# Patient Record
Sex: FEMALE | Race: WHITE | HISPANIC OR LATINO | URBAN - METROPOLITAN AREA
[De-identification: names, ages, dates, MRNs, and addresses within clinical notes are randomized per-mention and may not be internally consistent; named-entity substitution may affect disease eponyms.]

---

## 2017-08-16 ENCOUNTER — OUTPATIENT (OUTPATIENT)
Dept: OUTPATIENT SERVICES | Facility: HOSPITAL | Age: 52
LOS: 1 days | End: 2017-08-16
Payer: COMMERCIAL

## 2017-08-16 ENCOUNTER — APPOINTMENT (OUTPATIENT)
Dept: ULTRASOUND IMAGING | Facility: CLINIC | Age: 52
End: 2017-08-16
Payer: COMMERCIAL

## 2017-08-16 ENCOUNTER — APPOINTMENT (OUTPATIENT)
Dept: MAMMOGRAPHY | Facility: CLINIC | Age: 52
End: 2017-08-16
Payer: COMMERCIAL

## 2017-08-16 DIAGNOSIS — Z00.8 ENCOUNTER FOR OTHER GENERAL EXAMINATION: ICD-10-CM

## 2017-08-16 PROCEDURE — G0279: CPT | Mod: 26

## 2017-08-16 PROCEDURE — 76641 ULTRASOUND BREAST COMPLETE: CPT

## 2017-08-16 PROCEDURE — 76641 ULTRASOUND BREAST COMPLETE: CPT | Mod: 26,50

## 2017-08-16 PROCEDURE — G0204: CPT | Mod: 26

## 2017-08-16 PROCEDURE — 77066 DX MAMMO INCL CAD BI: CPT

## 2017-08-16 PROCEDURE — G0279: CPT

## 2017-08-23 DIAGNOSIS — R92.1 MAMMOGRAPHIC CALCIFICATION FOUND ON DIAGNOSTIC IMAGING OF BREAST: ICD-10-CM

## 2017-08-23 DIAGNOSIS — N63 UNSPECIFIED LUMP IN BREAST: ICD-10-CM

## 2017-08-23 DIAGNOSIS — N60.01 SOLITARY CYST OF RIGHT BREAST: ICD-10-CM

## 2017-08-23 DIAGNOSIS — R92.2 INCONCLUSIVE MAMMOGRAM: ICD-10-CM

## 2018-10-08 ENCOUNTER — OUTPATIENT (OUTPATIENT)
Dept: OUTPATIENT SERVICES | Facility: HOSPITAL | Age: 53
LOS: 1 days | End: 2018-10-08
Payer: COMMERCIAL

## 2018-10-08 ENCOUNTER — APPOINTMENT (OUTPATIENT)
Dept: MAMMOGRAPHY | Facility: CLINIC | Age: 53
End: 2018-10-08
Payer: MEDICARE

## 2018-10-08 DIAGNOSIS — Z00.8 ENCOUNTER FOR OTHER GENERAL EXAMINATION: ICD-10-CM

## 2018-10-08 PROCEDURE — 77067 SCR MAMMO BI INCL CAD: CPT

## 2018-10-08 PROCEDURE — 77063 BREAST TOMOSYNTHESIS BI: CPT

## 2018-10-08 PROCEDURE — 77063 BREAST TOMOSYNTHESIS BI: CPT | Mod: 26

## 2018-10-08 PROCEDURE — 77067 SCR MAMMO BI INCL CAD: CPT | Mod: 26

## 2019-04-01 ENCOUNTER — APPOINTMENT (OUTPATIENT)
Age: 54
End: 2019-04-01
Payer: COMMERCIAL

## 2019-04-01 VITALS
DIASTOLIC BLOOD PRESSURE: 65 MMHG | SYSTOLIC BLOOD PRESSURE: 100 MMHG | BODY MASS INDEX: 29.12 KG/M2 | WEIGHT: 135 LBS | HEART RATE: 68 BPM | HEIGHT: 57 IN

## 2019-04-01 PROCEDURE — 92588 EVOKED AUDITORY TST COMPLETE: CPT

## 2019-04-01 PROCEDURE — 31231 NASAL ENDOSCOPY DX: CPT

## 2019-04-01 PROCEDURE — 92567 TYMPANOMETRY: CPT

## 2019-04-01 PROCEDURE — 92557 COMPREHENSIVE HEARING TEST: CPT

## 2019-04-01 PROCEDURE — 99204 OFFICE O/P NEW MOD 45 MIN: CPT | Mod: 25

## 2019-04-01 PROCEDURE — 92563 TONE DECAY HEARING TEST: CPT

## 2019-04-01 NOTE — HISTORY OF PRESENT ILLNESS
[de-identified] : co noise as 1 mo no uri\par neg hx re ears  no hx hearing loss\par co nasal congestion and dry mucous\par hx sinusitis occasional\par spring allergies

## 2019-04-01 NOTE — PROCEDURE
[FreeTextEntry6] : Sinus endoscope # 5\par Nasal septum exam shows septal deviation to the rt moderate\par The sinus endoscope was introduced into the right nares\par exam right middle meatus reveals no mucopus or inflammation\par moderate crusting noted bilaterally\par The scope was advanced and the sphenoethmoid region was inspected.\par The superior meatus and nasal vault are unremarkable.  The nasopharynx is unremarkable without inflammation or mass\par The sinus endoscope was introduced into the left nares\par exam left middle meatus reveals no mucopus or inflammation\par The scope was advanced and the sphenoethmoid region was inspected.\par The left superior meatus and nasal vault are unremarkable.\par

## 2019-04-01 NOTE — REVIEW OF SYSTEMS
[Sneezing] : sneezing [Seasonal Allergies] : seasonal allergies [Cough] : cough [Heartburn] : heartburn [Negative] : Heme/Lymph [Patient Intake Form Reviewed] : Patient intake form was reviewed [FreeTextEntry1] : headache  joint aches  rash watery eyes  muscle aches

## 2019-04-01 NOTE — ASSESSMENT
[FreeTextEntry1] : crusting nose\par saline irrigations bid\par levaquin 500 #10\par audio as sn loss 500 hz\par tinnitus due tolow freq sn loss\par pred 10 #40\par fu audio 2 weeks\par mri iac  if not better

## 2019-04-29 ENCOUNTER — APPOINTMENT (OUTPATIENT)
Dept: OTOLARYNGOLOGY | Facility: CLINIC | Age: 54
End: 2019-04-29
Payer: COMMERCIAL

## 2019-04-29 VITALS
WEIGHT: 138 LBS | BODY MASS INDEX: 29.77 KG/M2 | HEIGHT: 57 IN | SYSTOLIC BLOOD PRESSURE: 101 MMHG | HEART RATE: 83 BPM | DIASTOLIC BLOOD PRESSURE: 61 MMHG

## 2019-04-29 DIAGNOSIS — J34.2 DEVIATED NASAL SEPTUM: ICD-10-CM

## 2019-04-29 PROCEDURE — 99214 OFFICE O/P EST MOD 30 MIN: CPT | Mod: 25

## 2019-04-29 PROCEDURE — 92553 AUDIOMETRY AIR & BONE: CPT

## 2019-04-29 PROCEDURE — 31231 NASAL ENDOSCOPY DX: CPT

## 2019-04-29 PROCEDURE — 92567 TYMPANOMETRY: CPT

## 2019-04-29 NOTE — PHYSICAL EXAM
[Nasal Endoscopy Performed] : nasal endoscopy was performed, see procedure section for findings [Midline] : trachea located in midline position [Normal] : no rashes [de-identified] : see procedure

## 2019-04-29 NOTE — HISTORY OF PRESENT ILLNESS
[de-identified] : fu as low frequency hearing loss ?tinnitus now better\par no vertigo\par seasonal allergy\par now claritin and saline spray

## 2019-04-29 NOTE — PROCEDURE
[FreeTextEntry6] : Sinus endoscope # 4\par Nasal septum exam shows septal deviation to the rt at valve 3 plus\par The sinus endoscope was introduced into the right nares\par exam right middle meatus reveals no mucopus or inflammation\par The scope was advanced and the sphenoethmoid region was inspected.\par The superior meatus and nasal vault are unremarkable.  The nasopharynx is unremarkable without inflammation or mass\par The sinus endoscope was introduced into the left nares\par exam left middle meatus reveals  mucopus crusting l mm\par The scope was advanced and the sphenoethmoid region was inspected.\par The left superior meatus and nasal vault are unremarkable.\par

## 2019-04-29 NOTE — REASON FOR VISIT
[Subsequent Evaluation] : a subsequent evaluation for [Hearing Loss] : hearing loss [FreeTextEntry2] : hearing

## 2019-04-29 NOTE — REVIEW OF SYSTEMS
[Recurrent Ear Infections] : recurrent ear infections [Sinus Pain] : sinus pain [Sinus Pressure] : sinus pressure [Discolored Nasal Discharge] : discolored nasal discharge [Negative] : Heme/Lymph

## 2019-04-29 NOTE — ASSESSMENT
[FreeTextEntry1] : clinical signs sinusitis\par allergic rhinitis\par audio as sn loss 500 hz\par rec fu audio 3 mo\par trial azxelastine\par cefdinir 300 #20

## 2019-07-29 ENCOUNTER — APPOINTMENT (OUTPATIENT)
Dept: OTOLARYNGOLOGY | Facility: CLINIC | Age: 54
End: 2019-07-29

## 2019-08-28 ENCOUNTER — APPOINTMENT (OUTPATIENT)
Dept: OTOLARYNGOLOGY | Facility: CLINIC | Age: 54
End: 2019-08-28

## 2019-09-23 ENCOUNTER — APPOINTMENT (OUTPATIENT)
Dept: OTOLARYNGOLOGY | Facility: CLINIC | Age: 54
End: 2019-09-23
Payer: COMMERCIAL

## 2019-09-23 VITALS
HEIGHT: 57 IN | BODY MASS INDEX: 28.91 KG/M2 | SYSTOLIC BLOOD PRESSURE: 99 MMHG | HEART RATE: 65 BPM | WEIGHT: 134 LBS | DIASTOLIC BLOOD PRESSURE: 63 MMHG

## 2019-09-23 DIAGNOSIS — H90.3 SENSORINEURAL HEARING LOSS, BILATERAL: ICD-10-CM

## 2019-09-23 PROCEDURE — 31231 NASAL ENDOSCOPY DX: CPT

## 2019-09-23 PROCEDURE — 92557 COMPREHENSIVE HEARING TEST: CPT

## 2019-09-23 PROCEDURE — 99213 OFFICE O/P EST LOW 20 MIN: CPT | Mod: 25

## 2019-09-23 PROCEDURE — 92567 TYMPANOMETRY: CPT

## 2019-09-23 NOTE — PROCEDURE
[FreeTextEntry6] : Indication:  Unable to examine nasal passages and paranasal sinus drainage adequately with anterior rhinoscopy.\par Sinus endoscope # 2\par Nasal septum exam shows   mild  septal deviation\par The inferior nasal turbinates are moderate in size with normal mucosa.\par The sinus endoscope was introduced into the right nares.   \par exam right middle meatus reveals 2+ mucopus.  There is moderate inflammation present in the middle meatus and involving the middle turbinate.\par The scope was advanced and the sphenoethmoid region was inspected.\par The superior meatus and nasal vault are unremarkable.  The nasopharynx is unremarkable without inflammation or mass\par The sinus endoscope was introduced into the left nares.\par Examination of the left middle meatus reveals 2+ mucopus with moderate inflammation of the middle turbinate.\par The scope was advanced and the sphenoethmoid region was inspected.\par The left superior meatus and nasal vault are unremarkable.  \par \par

## 2019-09-23 NOTE — ASSESSMENT
[FreeTextEntry1] : clinical signs sinusitis\par cefdinir\par \par allergic rhinitis\par audio as sn loss now 30 db as\par rec mri iac\par pred 20 bid than taper

## 2019-09-23 NOTE — HISTORY OF PRESENT ILLNESS
[de-identified] : now w weather change co excessive mucous nose and pnd\par recent uri\par had good response to azelastine and cefdinir\par pos hx allergy eye itching and serial sneezing\par fu as w mild low frequency loss and intermittent as tinnitus

## 2019-11-14 ENCOUNTER — FORM ENCOUNTER (OUTPATIENT)
Age: 54
End: 2019-11-14

## 2019-11-15 ENCOUNTER — APPOINTMENT (OUTPATIENT)
Dept: MRI IMAGING | Facility: CLINIC | Age: 54
End: 2019-11-15
Payer: COMMERCIAL

## 2019-11-15 ENCOUNTER — OUTPATIENT (OUTPATIENT)
Dept: OUTPATIENT SERVICES | Facility: HOSPITAL | Age: 54
LOS: 1 days | End: 2019-11-15
Payer: COMMERCIAL

## 2019-11-15 DIAGNOSIS — Z00.8 ENCOUNTER FOR OTHER GENERAL EXAMINATION: ICD-10-CM

## 2019-11-15 PROCEDURE — 70553 MRI BRAIN STEM W/O & W/DYE: CPT

## 2019-11-15 PROCEDURE — A9585: CPT

## 2019-11-15 PROCEDURE — 70553 MRI BRAIN STEM W/O & W/DYE: CPT | Mod: 26

## 2019-11-21 ENCOUNTER — APPOINTMENT (OUTPATIENT)
Dept: MAMMOGRAPHY | Facility: CLINIC | Age: 54
End: 2019-11-21
Payer: COMMERCIAL

## 2019-11-21 ENCOUNTER — APPOINTMENT (OUTPATIENT)
Dept: ULTRASOUND IMAGING | Facility: CLINIC | Age: 54
End: 2019-11-21
Payer: COMMERCIAL

## 2019-11-21 ENCOUNTER — OUTPATIENT (OUTPATIENT)
Dept: OUTPATIENT SERVICES | Facility: HOSPITAL | Age: 54
LOS: 1 days | End: 2019-11-21
Payer: COMMERCIAL

## 2019-11-21 ENCOUNTER — APPOINTMENT (OUTPATIENT)
Dept: RADIOLOGY | Facility: CLINIC | Age: 54
End: 2019-11-21
Payer: COMMERCIAL

## 2019-11-21 DIAGNOSIS — Z00.8 ENCOUNTER FOR OTHER GENERAL EXAMINATION: ICD-10-CM

## 2019-11-21 PROCEDURE — 77080 DXA BONE DENSITY AXIAL: CPT

## 2019-11-21 PROCEDURE — 76641 ULTRASOUND BREAST COMPLETE: CPT | Mod: 26,50

## 2019-11-21 PROCEDURE — 76641 ULTRASOUND BREAST COMPLETE: CPT

## 2019-11-21 PROCEDURE — 77063 BREAST TOMOSYNTHESIS BI: CPT | Mod: 26

## 2019-11-21 PROCEDURE — 77067 SCR MAMMO BI INCL CAD: CPT | Mod: 26

## 2019-11-21 PROCEDURE — 77080 DXA BONE DENSITY AXIAL: CPT | Mod: 26

## 2019-11-21 PROCEDURE — 77067 SCR MAMMO BI INCL CAD: CPT

## 2019-11-21 PROCEDURE — 77063 BREAST TOMOSYNTHESIS BI: CPT

## 2020-05-26 ENCOUNTER — APPOINTMENT (OUTPATIENT)
Dept: OTOLARYNGOLOGY | Facility: CLINIC | Age: 55
End: 2020-05-26

## 2020-06-15 ENCOUNTER — APPOINTMENT (OUTPATIENT)
Dept: OTOLARYNGOLOGY | Facility: CLINIC | Age: 55
End: 2020-06-15
Payer: COMMERCIAL

## 2020-06-15 VITALS — TEMPERATURE: 97.4 F | BODY MASS INDEX: 28.91 KG/M2 | WEIGHT: 134 LBS | HEIGHT: 57 IN

## 2020-06-15 DIAGNOSIS — J30.9 ALLERGIC RHINITIS, UNSPECIFIED: ICD-10-CM

## 2020-06-15 DIAGNOSIS — J32.2 CHRONIC ETHMOIDAL SINUSITIS: ICD-10-CM

## 2020-06-15 DIAGNOSIS — H68.022 CHRONIC EUSTACHIAN SALPINGITIS, LEFT EAR: ICD-10-CM

## 2020-06-15 PROCEDURE — 99214 OFFICE O/P EST MOD 30 MIN: CPT | Mod: 25

## 2020-06-15 PROCEDURE — 92557 COMPREHENSIVE HEARING TEST: CPT

## 2020-06-15 PROCEDURE — 92567 TYMPANOMETRY: CPT

## 2020-06-15 PROCEDURE — 31231 NASAL ENDOSCOPY DX: CPT

## 2020-06-15 NOTE — ASSESSMENT
[FreeTextEntry1] : hx chronic sinusitis\par doxycycline 10 bid #28\par continue azelastine\par daily saline irrigations\par consider repeat ct sinuses\par audio as sn loss stable source of tinnitus\par eust tube dys add levocitirizine\par consider ct sinuses

## 2020-06-15 NOTE — PROCEDURE
[FreeTextEntry6] : Indication:  Unable to adequately examine nasal passages and sinus drainage with anterior rhinoscopy.\par Scope # 4\par Mild to moderateseptal deviation is present on direct visualization on either side.\par Both inferior nasal turbinates are moderate in size with normal  appearing mucosa. \par The sinus endoscope was introduced into the right nares\par exam right middle meatus reveals no mucopus, polyps or inflammation.  The middle turbinate is unremarkable.\par The scope was advanced and the sphenoethmoid region was inspected.\par The superior meatus and nasal vault are unremarkable.  The nasopharynx is unremarkable without inflammation or mass\par The sinus endoscope was introduced into the left nares\par exam of the left middle meatus reveals no mucopus, polyps or inflammation and the left middle turbinate is unremarkable.\par The scope was advanced and the sphenoethmoid region was inspected.\par The left superior meatus and nasal vault are unremarkable.\par

## 2020-06-15 NOTE — HISTORY OF PRESENT ILLNESS
[de-identified] : co as popping cosntant hiss as\par had mri in past\par co excessive mucous nose colored\par claritin q d\par mri brain for sn loss shows left maxillary inflammation

## 2020-06-15 NOTE — REASON FOR VISIT
[Subsequent Evaluation] : a subsequent evaluation for [Tinnitus] : tinnitus [FreeTextEntry2] : left ear

## 2020-09-04 ENCOUNTER — TRANSCRIPTION ENCOUNTER (OUTPATIENT)
Age: 55
End: 2020-09-04

## 2020-09-14 ENCOUNTER — TRANSCRIPTION ENCOUNTER (OUTPATIENT)
Age: 55
End: 2020-09-14

## 2020-11-10 ENCOUNTER — TRANSCRIPTION ENCOUNTER (OUTPATIENT)
Age: 55
End: 2020-11-10

## 2020-11-12 ENCOUNTER — TRANSCRIPTION ENCOUNTER (OUTPATIENT)
Age: 55
End: 2020-11-12

## 2020-12-14 ENCOUNTER — OUTPATIENT (OUTPATIENT)
Dept: OUTPATIENT SERVICES | Facility: HOSPITAL | Age: 55
LOS: 1 days | End: 2020-12-14

## 2020-12-14 DIAGNOSIS — Z00.8 ENCOUNTER FOR OTHER GENERAL EXAMINATION: ICD-10-CM

## 2020-12-21 ENCOUNTER — APPOINTMENT (OUTPATIENT)
Dept: MAMMOGRAPHY | Facility: CLINIC | Age: 55
End: 2020-12-21
Payer: MEDICAID

## 2020-12-21 ENCOUNTER — OUTPATIENT (OUTPATIENT)
Dept: OUTPATIENT SERVICES | Facility: HOSPITAL | Age: 55
LOS: 1 days | End: 2020-12-21
Payer: MEDICAID

## 2020-12-21 DIAGNOSIS — Z00.8 ENCOUNTER FOR OTHER GENERAL EXAMINATION: ICD-10-CM

## 2020-12-21 PROCEDURE — 77067 SCR MAMMO BI INCL CAD: CPT

## 2020-12-21 PROCEDURE — 77063 BREAST TOMOSYNTHESIS BI: CPT | Mod: 26

## 2020-12-21 PROCEDURE — 77067 SCR MAMMO BI INCL CAD: CPT | Mod: 26

## 2020-12-21 PROCEDURE — 77063 BREAST TOMOSYNTHESIS BI: CPT

## 2021-01-13 ENCOUNTER — TRANSCRIPTION ENCOUNTER (OUTPATIENT)
Age: 56
End: 2021-01-13

## 2021-01-18 ENCOUNTER — EMERGENCY (EMERGENCY)
Facility: HOSPITAL | Age: 56
LOS: 1 days | Discharge: HOME | End: 2021-01-18
Attending: EMERGENCY MEDICINE | Admitting: EMERGENCY MEDICINE
Payer: MEDICAID

## 2021-01-18 VITALS
HEIGHT: 57 IN | TEMPERATURE: 98 F | SYSTOLIC BLOOD PRESSURE: 111 MMHG | WEIGHT: 134.04 LBS | HEART RATE: 89 BPM | OXYGEN SATURATION: 96 % | RESPIRATION RATE: 16 BRPM | DIASTOLIC BLOOD PRESSURE: 78 MMHG

## 2021-01-18 VITALS
HEART RATE: 69 BPM | SYSTOLIC BLOOD PRESSURE: 111 MMHG | DIASTOLIC BLOOD PRESSURE: 73 MMHG | RESPIRATION RATE: 17 BRPM | TEMPERATURE: 98 F | OXYGEN SATURATION: 99 %

## 2021-01-18 LAB
ALBUMIN SERPL ELPH-MCNC: 3.5 G/DL — SIGNIFICANT CHANGE UP (ref 3.3–5)
ALP SERPL-CCNC: 87 U/L — SIGNIFICANT CHANGE UP (ref 30–120)
ALT FLD-CCNC: 26 U/L DA — SIGNIFICANT CHANGE UP (ref 10–60)
ANION GAP SERPL CALC-SCNC: 9 MMOL/L — SIGNIFICANT CHANGE UP (ref 5–17)
APPEARANCE UR: CLEAR — SIGNIFICANT CHANGE UP
AST SERPL-CCNC: 18 U/L — SIGNIFICANT CHANGE UP (ref 10–40)
BACTERIA # UR AUTO: ABNORMAL
BASOPHILS # BLD AUTO: 0.01 K/UL — SIGNIFICANT CHANGE UP (ref 0–0.2)
BASOPHILS NFR BLD AUTO: 0.1 % — SIGNIFICANT CHANGE UP (ref 0–2)
BILIRUB SERPL-MCNC: 0.5 MG/DL — SIGNIFICANT CHANGE UP (ref 0.2–1.2)
BILIRUB UR-MCNC: NEGATIVE — SIGNIFICANT CHANGE UP
BUN SERPL-MCNC: 17 MG/DL — SIGNIFICANT CHANGE UP (ref 7–23)
CALCIUM SERPL-MCNC: 9.1 MG/DL — SIGNIFICANT CHANGE UP (ref 8.4–10.5)
CHLORIDE SERPL-SCNC: 106 MMOL/L — SIGNIFICANT CHANGE UP (ref 96–108)
CO2 SERPL-SCNC: 26 MMOL/L — SIGNIFICANT CHANGE UP (ref 22–31)
COLOR SPEC: YELLOW — SIGNIFICANT CHANGE UP
CREAT SERPL-MCNC: 0.74 MG/DL — SIGNIFICANT CHANGE UP (ref 0.5–1.3)
DIFF PNL FLD: ABNORMAL
EOSINOPHIL # BLD AUTO: 0.13 K/UL — SIGNIFICANT CHANGE UP (ref 0–0.5)
EOSINOPHIL NFR BLD AUTO: 1.6 % — SIGNIFICANT CHANGE UP (ref 0–6)
EPI CELLS # UR: SIGNIFICANT CHANGE UP
GLUCOSE SERPL-MCNC: 106 MG/DL — HIGH (ref 70–99)
GLUCOSE UR QL: NEGATIVE MG/DL — SIGNIFICANT CHANGE UP
HCT VFR BLD CALC: 36.1 % — SIGNIFICANT CHANGE UP (ref 34.5–45)
HGB BLD-MCNC: 11.6 G/DL — SIGNIFICANT CHANGE UP (ref 11.5–15.5)
IMM GRANULOCYTES NFR BLD AUTO: 0.3 % — SIGNIFICANT CHANGE UP (ref 0–1.5)
KETONES UR-MCNC: NEGATIVE — SIGNIFICANT CHANGE UP
LEUKOCYTE ESTERASE UR-ACNC: ABNORMAL
LYMPHOCYTES # BLD AUTO: 1.84 K/UL — SIGNIFICANT CHANGE UP (ref 1–3.3)
LYMPHOCYTES # BLD AUTO: 23.3 % — SIGNIFICANT CHANGE UP (ref 13–44)
MCHC RBC-ENTMCNC: 27 PG — SIGNIFICANT CHANGE UP (ref 27–34)
MCHC RBC-ENTMCNC: 32.1 GM/DL — SIGNIFICANT CHANGE UP (ref 32–36)
MCV RBC AUTO: 84.1 FL — SIGNIFICANT CHANGE UP (ref 80–100)
MONOCYTES # BLD AUTO: 0.55 K/UL — SIGNIFICANT CHANGE UP (ref 0–0.9)
MONOCYTES NFR BLD AUTO: 7 % — SIGNIFICANT CHANGE UP (ref 2–14)
NEUTROPHILS # BLD AUTO: 5.35 K/UL — SIGNIFICANT CHANGE UP (ref 1.8–7.4)
NEUTROPHILS NFR BLD AUTO: 67.7 % — SIGNIFICANT CHANGE UP (ref 43–77)
NITRITE UR-MCNC: NEGATIVE — SIGNIFICANT CHANGE UP
NRBC # BLD: 0 /100 WBCS — SIGNIFICANT CHANGE UP (ref 0–0)
PH UR: 7 — SIGNIFICANT CHANGE UP (ref 5–8)
PLATELET # BLD AUTO: 246 K/UL — SIGNIFICANT CHANGE UP (ref 150–400)
POTASSIUM SERPL-MCNC: 3.6 MMOL/L — SIGNIFICANT CHANGE UP (ref 3.5–5.3)
POTASSIUM SERPL-SCNC: 3.6 MMOL/L — SIGNIFICANT CHANGE UP (ref 3.5–5.3)
PROT SERPL-MCNC: 7.3 G/DL — SIGNIFICANT CHANGE UP (ref 6–8.3)
PROT UR-MCNC: 100 MG/DL
RBC # BLD: 4.29 M/UL — SIGNIFICANT CHANGE UP (ref 3.8–5.2)
RBC # FLD: 12.6 % — SIGNIFICANT CHANGE UP (ref 10.3–14.5)
RBC CASTS # UR COMP ASSIST: ABNORMAL /HPF (ref 0–4)
SODIUM SERPL-SCNC: 141 MMOL/L — SIGNIFICANT CHANGE UP (ref 135–145)
SP GR SPEC: 1.01 — SIGNIFICANT CHANGE UP (ref 1.01–1.02)
UROBILINOGEN FLD QL: NEGATIVE MG/DL — SIGNIFICANT CHANGE UP
WBC # BLD: 7.9 K/UL — SIGNIFICANT CHANGE UP (ref 3.8–10.5)
WBC # FLD AUTO: 7.9 K/UL — SIGNIFICANT CHANGE UP (ref 3.8–10.5)
WBC UR QL: >50

## 2021-01-18 PROCEDURE — 36415 COLL VENOUS BLD VENIPUNCTURE: CPT

## 2021-01-18 PROCEDURE — 96360 HYDRATION IV INFUSION INIT: CPT

## 2021-01-18 PROCEDURE — 99284 EMERGENCY DEPT VISIT MOD MDM: CPT

## 2021-01-18 PROCEDURE — 85025 COMPLETE CBC W/AUTO DIFF WBC: CPT

## 2021-01-18 PROCEDURE — 80053 COMPREHEN METABOLIC PANEL: CPT

## 2021-01-18 PROCEDURE — 99283 EMERGENCY DEPT VISIT LOW MDM: CPT | Mod: 25

## 2021-01-18 PROCEDURE — 87086 URINE CULTURE/COLONY COUNT: CPT

## 2021-01-18 PROCEDURE — 81001 URINALYSIS AUTO W/SCOPE: CPT

## 2021-01-18 RX ORDER — SODIUM CHLORIDE 9 MG/ML
1000 INJECTION INTRAMUSCULAR; INTRAVENOUS; SUBCUTANEOUS ONCE
Refills: 0 | Status: COMPLETED | OUTPATIENT
Start: 2021-01-18 | End: 2021-01-18

## 2021-01-18 RX ORDER — PHENAZOPYRIDINE HCL 100 MG
1 TABLET ORAL
Qty: 6 | Refills: 0
Start: 2021-01-18 | End: 2021-01-19

## 2021-01-18 RX ORDER — PHENAZOPYRIDINE HCL 100 MG
200 TABLET ORAL ONCE
Refills: 0 | Status: COMPLETED | OUTPATIENT
Start: 2021-01-18 | End: 2021-01-18

## 2021-01-18 RX ORDER — AZTREONAM 2 G
1 VIAL (EA) INJECTION
Qty: 14 | Refills: 0
Start: 2021-01-18 | End: 2021-01-24

## 2021-01-18 RX ADMIN — Medication 1 TABLET(S): at 01:32

## 2021-01-18 RX ADMIN — SODIUM CHLORIDE 1000 MILLILITER(S): 9 INJECTION INTRAMUSCULAR; INTRAVENOUS; SUBCUTANEOUS at 02:00

## 2021-01-18 RX ADMIN — Medication 200 MILLIGRAM(S): at 01:20

## 2021-01-18 RX ADMIN — SODIUM CHLORIDE 1000 MILLILITER(S): 9 INJECTION INTRAMUSCULAR; INTRAVENOUS; SUBCUTANEOUS at 01:22

## 2021-01-18 NOTE — ED PROVIDER NOTE - OBJECTIVE STATEMENT
Patient developed pain on urination, and blood in urine a few days ago. Went to Urgent Care and was put on doxycycline and nitrofurantoin for a UTI. Seemed a little better at first, but tonight, the pain/burning on urination is severe again, and the blood in the urine is back. No fever. No n/v/d/c. Mild lower back pain. No flank pain. Some suprapubic pain.

## 2021-01-18 NOTE — ED PROVIDER NOTE - PATIENT PORTAL LINK FT
You can access the FollowMyHealth Patient Portal offered by [facility] by registering at the following website: [email or web address]. By joining [facility]’s FollowMyHealth portal, you will also be able to view your health information using other applications (apps) compatible with our system.

## 2021-01-18 NOTE — ED ADULT NURSE NOTE - NSIMPLEMENTINTERV_GEN_ALL_ED
Implemented All Universal Safety Interventions:  [place] to call system. Call bell, personal items and telephone within reach. Instruct patient to call for assistance. Room bathroom lighting operational. Non-slip footwear when patient is off stretcher. Physically safe environment: no spills, clutter or unnecessary equipment. Stretcher in lowest position, wheels locked, appropriate side rails in place.

## 2021-01-18 NOTE — ED ADULT NURSE NOTE - OBJECTIVE STATEMENT
pt presented to ED with c/o burning urination and left lower back pain, diagnosed with UTI 3 days ago is on antibiotics, denies fever/chills, denies N/V/D.

## 2021-01-18 NOTE — ED PROVIDER NOTE - NSFOLLOWUPINSTRUCTIONS_ED_ALL_ED_FT
Urinary Tract Infection in Women    WHAT YOU NEED TO KNOW:    What is a urinary tract infection (UTI)? A UTI is caused by bacteria that get inside your urinary tract. Your urinary tract includes your kidneys, ureters, bladder, and urethra. Urine is made in your kidneys, and it flows from the ureters to the bladder. Urine leaves the bladder through the urethra. A UTI is more common in your lower urinary tract, which includes your bladder and urethra.     Female Urinary System         What increases my risk for a UTI?   •A urinary catheter or self-catheterization      •Pregnancy      •Urinary tract problems, such as a narrowing, kidney stones, or inability to empty your bladder completely      •History of a UTI      •Sexual intercourse      •Menopause      •Diabetes or obesity      What are the signs and symptoms of a UTI?   •Urinating more often than usual, leaking urine, or waking from sleep to urinate      •Pain or burning when you urinate      •Pain or pressure in your lower abdomen       •Urine that smells bad      •Blood in your urine      How is a UTI diagnosed? Your healthcare provider will ask about your signs and symptoms. Your provider may press on your abdomen, sides, and back to check if you feel pain. Your urine will be tested for bacteria that may be causing your infection. If you have UTIs often, you may need more tests to find the cause.    How is a UTI treated?   •Antibiotics help fight a bacterial infection. If you have UTIs often (called recurrent UTIs), you may be given antibiotics to take regularly. You will be given directions for when and how to use antibiotics. The goal is to prevent UTIs but not cause antibiotic resistance by using antibiotics too often.      •Medicines may be given to decrease pain and burning when you urinate. They will also help decrease the feeling that you need to urinate often. These medicines will make your urine orange or red.      What can I do to prevent a UTI?   •Talk to your healthcare provider about your birth control method. You may need to change your method if it is increasing your risk for UTIs.      •Empty your bladder often. Urinate and empty your bladder as soon as you feel the need. Do not hold your urine for long periods of time.      •Wipe from front to back after you urinate or have a bowel movement. This will help prevent germs from getting into your urinary tract through your urethra.      •Drink liquids as directed. Ask how much liquid to drink each day and which liquids are best for you. You may need to drink more liquids than usual to help flush out the bacteria. Do not drink alcohol, caffeine, or citrus juices. These can irritate your bladder and increase your symptoms. Your healthcare provider may recommend cranberry juice to help prevent a UTI.      •Urinate after you have sex. This can help flush out bacteria passed during sex.      •Do not douche or use feminine deodorants. These can change the chemical balance in your vagina.      •Change sanitary pads or tampons often. This will help prevent germs from getting into your urinary tract.       •Talk to your healthcare provider about your birth control method. You may need to change your method if it is increasing your risk for UTIs.      •Wear cotton underwear and clothes that are loose. Tight pants and nylon underwear can trap moisture and cause bacteria to grow.      •Vaginal estrogen may be recommended. This medicine helps prevent UTIs in women who have gone through menopause or are in meet-menopause.      •Do pelvic muscle exercises often. Pelvic muscle exercises may help you start and stop urinating. Strong pelvic muscles may help you empty your bladder easier. Squeeze these muscles tightly for 5 seconds like you are trying to hold back urine. Then relax for 5 seconds. Gradually work up to squeezing for 10 seconds. Do 3 sets of 15 repetitions a day, or as directed.      When should I seek immediate care?   •You are urinating very little or not at all.      •You have a high fever with shaking chills.       •You have side or back pain that gets worse.      When should I call my doctor?   •You have a mild fever.      •You do not feel better after 2 days of taking antibiotics.      •You have new symptoms, such as blood or pus in your urine.       •You are vomiting.       •You have questions or concerns about your condition or care.      CARE AGREEMENT:    You have the right to help plan your care. Learn about your health condition and how it may be treated. Discuss treatment options with your healthcare providers to decide what care you want to receive. You always have the right to refuse treatment.

## 2021-01-18 NOTE — ED ADULT NURSE REASSESSMENT NOTE - NS ED NURSE REASSESS COMMENT FT1
pt seen and examined by dr barrios. md aware of all results. pt d/c to self. verbalized understanding of d/c instructions. left unit in NAD. ambulated unassisted

## 2021-01-18 NOTE — ED PROVIDER NOTE - CLINICAL SUMMARY MEDICAL DECISION MAKING FREE TEXT BOX
Patient with symptoms of cystitis, not responding to prescribed antibiotics. Will check labs, urine, and switch antibiotics. Will start phenazopyridine for symptoms

## 2021-01-19 LAB
CULTURE RESULTS: SIGNIFICANT CHANGE UP
SPECIMEN SOURCE: SIGNIFICANT CHANGE UP

## 2021-02-18 ENCOUNTER — TRANSCRIPTION ENCOUNTER (OUTPATIENT)
Age: 56
End: 2021-02-18

## 2021-02-23 ENCOUNTER — TRANSCRIPTION ENCOUNTER (OUTPATIENT)
Age: 56
End: 2021-02-23

## 2021-03-13 ENCOUNTER — TRANSCRIPTION ENCOUNTER (OUTPATIENT)
Age: 56
End: 2021-03-13

## 2021-05-07 ENCOUNTER — TRANSCRIPTION ENCOUNTER (OUTPATIENT)
Age: 56
End: 2021-05-07

## 2021-06-12 ENCOUNTER — EMERGENCY (EMERGENCY)
Facility: HOSPITAL | Age: 56
LOS: 1 days | Discharge: HOME | End: 2021-06-12
Attending: EMERGENCY MEDICINE | Admitting: EMERGENCY MEDICINE
Payer: MEDICAID

## 2021-06-12 VITALS
WEIGHT: 130.07 LBS | DIASTOLIC BLOOD PRESSURE: 58 MMHG | OXYGEN SATURATION: 100 % | HEIGHT: 57 IN | HEART RATE: 62 BPM | SYSTOLIC BLOOD PRESSURE: 108 MMHG | RESPIRATION RATE: 20 BRPM | TEMPERATURE: 98 F

## 2021-06-12 LAB
ALBUMIN SERPL ELPH-MCNC: 3.4 G/DL — SIGNIFICANT CHANGE UP (ref 3.3–5)
ALP SERPL-CCNC: 82 U/L — SIGNIFICANT CHANGE UP (ref 30–120)
ALT FLD-CCNC: 32 U/L DA — SIGNIFICANT CHANGE UP (ref 10–60)
ANION GAP SERPL CALC-SCNC: 5 MMOL/L — SIGNIFICANT CHANGE UP (ref 5–17)
AST SERPL-CCNC: 23 U/L — SIGNIFICANT CHANGE UP (ref 10–40)
BASOPHILS # BLD AUTO: 0.01 K/UL — SIGNIFICANT CHANGE UP (ref 0–0.2)
BASOPHILS NFR BLD AUTO: 0.2 % — SIGNIFICANT CHANGE UP (ref 0–2)
BILIRUB SERPL-MCNC: 0.5 MG/DL — SIGNIFICANT CHANGE UP (ref 0.2–1.2)
BUN SERPL-MCNC: 17 MG/DL — SIGNIFICANT CHANGE UP (ref 7–23)
CALCIUM SERPL-MCNC: 9 MG/DL — SIGNIFICANT CHANGE UP (ref 8.4–10.5)
CHLORIDE SERPL-SCNC: 106 MMOL/L — SIGNIFICANT CHANGE UP (ref 96–108)
CO2 SERPL-SCNC: 27 MMOL/L — SIGNIFICANT CHANGE UP (ref 22–31)
CREAT SERPL-MCNC: 0.87 MG/DL — SIGNIFICANT CHANGE UP (ref 0.5–1.3)
D DIMER BLD IA.RAPID-MCNC: <150 NG/ML DDU — SIGNIFICANT CHANGE UP
EOSINOPHIL # BLD AUTO: 0.24 K/UL — SIGNIFICANT CHANGE UP (ref 0–0.5)
EOSINOPHIL NFR BLD AUTO: 4.2 % — SIGNIFICANT CHANGE UP (ref 0–6)
GLUCOSE SERPL-MCNC: 99 MG/DL — SIGNIFICANT CHANGE UP (ref 70–99)
HCT VFR BLD CALC: 35.1 % — SIGNIFICANT CHANGE UP (ref 34.5–45)
HGB BLD-MCNC: 11.4 G/DL — LOW (ref 11.5–15.5)
IMM GRANULOCYTES NFR BLD AUTO: 0.2 % — SIGNIFICANT CHANGE UP (ref 0–1.5)
LYMPHOCYTES # BLD AUTO: 1.86 K/UL — SIGNIFICANT CHANGE UP (ref 1–3.3)
LYMPHOCYTES # BLD AUTO: 32.7 % — SIGNIFICANT CHANGE UP (ref 13–44)
MCHC RBC-ENTMCNC: 27.5 PG — SIGNIFICANT CHANGE UP (ref 27–34)
MCHC RBC-ENTMCNC: 32.5 GM/DL — SIGNIFICANT CHANGE UP (ref 32–36)
MCV RBC AUTO: 84.8 FL — SIGNIFICANT CHANGE UP (ref 80–100)
MONOCYTES # BLD AUTO: 0.45 K/UL — SIGNIFICANT CHANGE UP (ref 0–0.9)
MONOCYTES NFR BLD AUTO: 7.9 % — SIGNIFICANT CHANGE UP (ref 2–14)
NEUTROPHILS # BLD AUTO: 3.12 K/UL — SIGNIFICANT CHANGE UP (ref 1.8–7.4)
NEUTROPHILS NFR BLD AUTO: 54.8 % — SIGNIFICANT CHANGE UP (ref 43–77)
NRBC # BLD: 0 /100 WBCS — SIGNIFICANT CHANGE UP (ref 0–0)
PLATELET # BLD AUTO: 227 K/UL — SIGNIFICANT CHANGE UP (ref 150–400)
POTASSIUM SERPL-MCNC: 3.7 MMOL/L — SIGNIFICANT CHANGE UP (ref 3.5–5.3)
POTASSIUM SERPL-SCNC: 3.7 MMOL/L — SIGNIFICANT CHANGE UP (ref 3.5–5.3)
PROT SERPL-MCNC: 6.7 G/DL — SIGNIFICANT CHANGE UP (ref 6–8.3)
RBC # BLD: 4.14 M/UL — SIGNIFICANT CHANGE UP (ref 3.8–5.2)
RBC # FLD: 13.2 % — SIGNIFICANT CHANGE UP (ref 10.3–14.5)
SODIUM SERPL-SCNC: 138 MMOL/L — SIGNIFICANT CHANGE UP (ref 135–145)
TROPONIN I SERPL-MCNC: 0 NG/ML — LOW (ref 0.02–0.06)
WBC # BLD: 5.69 K/UL — SIGNIFICANT CHANGE UP (ref 3.8–10.5)
WBC # FLD AUTO: 5.69 K/UL — SIGNIFICANT CHANGE UP (ref 3.8–10.5)

## 2021-06-12 PROCEDURE — 99285 EMERGENCY DEPT VISIT HI MDM: CPT

## 2021-06-12 PROCEDURE — 71046 X-RAY EXAM CHEST 2 VIEWS: CPT | Mod: 26

## 2021-06-12 PROCEDURE — 93010 ELECTROCARDIOGRAM REPORT: CPT

## 2021-06-12 NOTE — ED ADULT NURSE NOTE - OBJECTIVE STATEMENT
57yo female walked into ED, pt c/o left sided chest pain X2 days prior to ED arrival. pt indicates symptoms feel like "heart burn". pt indicates symptoms also worsen upon palpitation. pt denies dizziness, SOB

## 2021-06-12 NOTE — ED PROVIDER NOTE - CLINICAL SUMMARY MEDICAL DECISION MAKING FREE TEXT BOX
left sided cp - r/o acs vs PE, possibly gastritis/reflux/ulcer related vs costochondritis- labs and meds, if all negative to f/u cards/GI

## 2021-06-12 NOTE — ED PROVIDER NOTE - OBJECTIVE STATEMENT
56 y.o. F c/o left lower cp since yesterday, got worse after eating potato tonight and had to cough, felt the pain was worse, tried a pill for acid, helped a little, never had pain like this before, no sob, no n/v, was able to eat after potato without incident

## 2021-06-12 NOTE — ED PROVIDER NOTE - NSFOLLOWUPINSTRUCTIONS_ED_ALL_ED_FT
Costochondritis    WHAT YOU NEED TO KNOW:    Costochondritis is a condition that causes pain in the cartilage that connect your ribs to your sternum (breastbone). Cartilage is the tough, bendable tissue that protects your bones.     DISCHARGE INSTRUCTIONS:    Medicines:   •Acetaminophen: This medicine decreases pain. Acetaminophen is available without a doctor's order. Ask how much to take and how often to take it. Follow directions. Acetaminophen can cause liver damage if not taken correctly.      •NSAIDs, such as ibuprofen, help decrease swelling, pain, and fever. This medicine is available with or without a doctor's order. NSAIDs can cause stomach bleeding or kidney problems in certain people. If you take blood thinner medicine, always ask if NSAIDs are safe for you. Always read the medicine label and follow directions. Do not give these medicines to children under 6 months of age without direction from your child's healthcare provider.      •Take your medicine as directed. Contact your healthcare provider if you think your medicine is not helping or if you have side effects. Tell him of her if you are allergic to any medicine. Keep a list of the medicines, vitamins, and herbs you take. Include the amounts, and when and why you take them. Bring the list or the pill bottles to follow-up visits. Carry your medicine list with you in case of an emergency.      Follow up with your healthcare provider as directed: Write down your questions so you remember to ask them during your visits.     Rest: You may need to get more rest. Learn which movements and activities cause pain, and avoid doing them. Do not carry objects, such as a purse or backpack, if this is painful. Avoid activities such as rowing and weightlifting until your pain decreases or goes away. Ask which activities are best for you to do while you recover.    Heat: Heat helps decrease pain in some patients. Apply heat on the area for 20 to 30 minutes every 2 hours for as many days as directed.     Ice: Ice helps decrease swelling and pain. Ice may also help prevent tissue damage. Use an ice pack, or put crushed ice in a plastic bag. Cover it with a towel and place it on the painful area for 15 to 20 minutes every hour or as directed.    Stretching exercises: Gentle stretching may help your symptoms.  a doorway and put your hands on the door frame at the level of your ears or shoulders. Take 1 step forward and gently stretch your chest. Try this with your hands higher up on the doorway.     Contact your healthcare provider if:   •You have a fever.      •The painful areas of your chest look swollen, red, and feel warm to the touch.       •You cannot sleep because of the pain.      •You have questions or concerns about your condition or care.        Chest Pain    WHAT YOU NEED TO KNOW:    Chest pain can be caused by a range of conditions, from not serious to life-threatening. Chest pain can be a symptom of a digestive problem, such as acid reflux or a stomach ulcer. An anxiety attack or a strong emotion, such as anger, can also cause chest pain. Infection, inflammation, or a fracture in the bones or cartilage in your chest can cause pain or discomfort. Sometimes chest pain or pressure is caused by poor blood flow to your heart (angina). Chest pain may also be caused by life-threatening conditions such as a heart attack or blood clot in your lungs.    DISCHARGE INSTRUCTIONS:    Call your local emergency number (911 in the ) or have someone call if:   •You have any of the following signs of a heart attack: ?Squeezing, pressure, or pain in your chest      ?You may also have any of the following: ?Discomfort or pain in your back, neck, jaw, stomach, or arm      ?Shortness of breath      ?Nausea or vomiting      ?Lightheadedness or a sudden cold sweat            Return to the emergency department if:   •You have chest discomfort that gets worse, even with medicine.      •You cough or vomit blood.      •Your bowel movements are black or bloody.      •You cannot stop vomiting, or it hurts to swallow.      Call your doctor if:   •You have questions or concerns about your condition or care.          Medicines:   •Medicines may be given to treat the cause of your chest pain. Examples include pain medicine, anxiety medicine, or medicines to increase blood flow to your heart.      •Do not take certain medicines without asking your healthcare provider first. These include NSAIDs, herbal or vitamin supplements, or hormones (estrogen or progestin).      •Take your medicine as directed. Contact your healthcare provider if you think your medicine is not helping or if you have side effects. Tell him or her if you are allergic to any medicine. Keep a list of the medicines, vitamins, and herbs you take. Include the amounts, and when and why you take them. Bring the list or the pill bottles to follow-up visits. Carry your medicine list with you in case of an emergency.      Healthy living tips: The following are general healthy guidelines. If the cause of your chest pain is known, your healthcare provider will give you specific guidelines to follow.  •Do not smoke. Nicotine and other chemicals in cigarettes and cigars can cause lung and heart damage. Ask your healthcare provider for information if you currently smoke and need help to quit. E-cigarettes or smokeless tobacco still contain nicotine. Talk to your healthcare provider before you use these products.      •Choose a variety of healthy foods as often as possible. Include fresh, frozen, or canned fruits and vegetables. Also include low-fat dairy products, fish, chicken (without skin), and lean meats. Your healthcare provider or a dietitian can help you create meal plans. You may need to avoid certain foods or drinks if your pain is caused by a digestion problem.  Healthy Foods           •Lower your sodium (salt) intake. Limit foods that are high in sodium, such as canned foods, salty snacks, and cold cuts. If you add salt when you cook food, do not add more at the table. Choose low-sodium canned foods as much as possible.             •Drink plenty of water every day. Water helps your body to control your temperature and blood pressure. Ask your healthcare provider how much water you should drink every day.      •Ask about activity. Your healthcare provider will tell you which activities to limit or avoid. Ask when you can drive, return to work, and have sex. Ask about the best exercise plan for you.      •Maintain a healthy weight. Ask your healthcare provider what a healthy weight is for you. Ask him or her to help you create a safe weight loss plan if you are overweight.      •Ask about vaccines you may need. Get the influenza (flu) vaccine every year as soon as recommended, usually in September or October. You may also need a pneumococcal vaccine to prevent pneumonia. The vaccine is usually given every 5 years, starting at age 65. Your healthcare provider can tell you if should get other vaccines, and when to get them.      Follow up with your healthcare provider within 72 hours, or as directed: You may need to return for more tests to find the cause of your chest pain. You may be referred to a specialist, such as a cardiologist or gastroenterologist. Write down your questions so you remember to ask them during your visits.

## 2021-06-12 NOTE — ED PROVIDER NOTE - RESPIRATORY, MLM
Breath sounds clear and equal bilaterally. mild ttp left lower anterior chest wall, just lateral to sternum

## 2021-06-12 NOTE — ED PROVIDER NOTE - PROGRESS NOTE DETAILS
pt asking for tylenol for HA, feels better than on arrival, discussed results, will provide copy, ready for dc

## 2021-06-12 NOTE — ED PROVIDER NOTE - PATIENT PORTAL LINK FT
You can access the FollowMyHealth Patient Portal offered by [facility] by registering at the following website: [email or web address]. By joining [employer]’s FollowMyHealth portal, you will also be able to view your health information using other applications (apps) compatible with our system.

## 2021-06-12 NOTE — ED PROVIDER NOTE - CARE PROVIDER_API CALL
Rolando Guadalupe (MD)  Cardiovascular Disease; Internal Medicine  [address], [address]  [place] [address]  Phone: [phone]  Fax: [phone]  Follow Up Time: 1-3 Days    Elliott Anthony ()  Internal Medicine  [address] [place] [address]  Phone: [phone]  Fax: [phone]  Follow Up Time: 1-3 Days

## 2021-06-12 NOTE — ED PROVIDER NOTE - PROVIDER TOKENS
PROVIDER:[TOKEN:[[id number]],FOLLOWUP:[1-3 Days]],PROVIDER:[TOKEN:[[id number]],FOLLOWUP:[1-3 Days]]

## 2021-06-13 VITALS
DIASTOLIC BLOOD PRESSURE: 53 MMHG | SYSTOLIC BLOOD PRESSURE: 91 MMHG | RESPIRATION RATE: 18 BRPM | TEMPERATURE: 98 F | HEART RATE: 66 BPM | OXYGEN SATURATION: 98 %

## 2021-06-13 PROCEDURE — 99284 EMERGENCY DEPT VISIT MOD MDM: CPT | Mod: 25

## 2021-06-13 PROCEDURE — 36415 COLL VENOUS BLD VENIPUNCTURE: CPT

## 2021-06-13 PROCEDURE — 85379 FIBRIN DEGRADATION QUANT: CPT

## 2021-06-13 PROCEDURE — 85025 COMPLETE CBC W/AUTO DIFF WBC: CPT

## 2021-06-13 PROCEDURE — 84484 ASSAY OF TROPONIN QUANT: CPT

## 2021-06-13 PROCEDURE — 71046 X-RAY EXAM CHEST 2 VIEWS: CPT

## 2021-06-13 PROCEDURE — 80053 COMPREHEN METABOLIC PANEL: CPT

## 2021-06-13 PROCEDURE — 93005 ELECTROCARDIOGRAM TRACING: CPT

## 2021-06-13 RX ORDER — ACETAMINOPHEN 500 MG
650 TABLET ORAL ONCE
Refills: 0 | Status: COMPLETED | OUTPATIENT
Start: 2021-06-13 | End: 2021-06-13

## 2021-06-13 RX ADMIN — Medication 30 MILLILITER(S): at 00:20

## 2021-06-13 RX ADMIN — Medication 650 MILLIGRAM(S): at 00:41

## 2021-08-20 ENCOUNTER — EMERGENCY (EMERGENCY)
Facility: HOSPITAL | Age: 56
LOS: 1 days | Discharge: HOME | End: 2021-08-20
Attending: INTERNAL MEDICINE | Admitting: INTERNAL MEDICINE
Payer: MEDICAID

## 2021-08-20 VITALS
HEART RATE: 74 BPM | OXYGEN SATURATION: 98 % | DIASTOLIC BLOOD PRESSURE: 70 MMHG | RESPIRATION RATE: 18 BRPM | HEIGHT: 57 IN | SYSTOLIC BLOOD PRESSURE: 106 MMHG | TEMPERATURE: 98 F | WEIGHT: 128.97 LBS

## 2021-08-20 LAB
ALBUMIN SERPL ELPH-MCNC: 3.8 G/DL — SIGNIFICANT CHANGE UP (ref 3.3–5)
ALP SERPL-CCNC: 93 U/L — SIGNIFICANT CHANGE UP (ref 30–120)
ALT FLD-CCNC: 26 U/L DA — SIGNIFICANT CHANGE UP (ref 10–60)
AMYLASE P1 CFR SERPL: 70 U/L — SIGNIFICANT CHANGE UP (ref 25–125)
ANION GAP SERPL CALC-SCNC: 7 MMOL/L — SIGNIFICANT CHANGE UP (ref 5–17)
APPEARANCE UR: CLEAR — SIGNIFICANT CHANGE UP
AST SERPL-CCNC: 18 U/L — SIGNIFICANT CHANGE UP (ref 10–40)
BACTERIA # UR AUTO: ABNORMAL
BILIRUB SERPL-MCNC: 0.6 MG/DL — SIGNIFICANT CHANGE UP (ref 0.2–1.2)
BILIRUB UR-MCNC: NEGATIVE — SIGNIFICANT CHANGE UP
BUN SERPL-MCNC: 15 MG/DL — SIGNIFICANT CHANGE UP (ref 7–23)
CALCIUM SERPL-MCNC: 8.8 MG/DL — SIGNIFICANT CHANGE UP (ref 8.4–10.5)
CHLORIDE SERPL-SCNC: 106 MMOL/L — SIGNIFICANT CHANGE UP (ref 96–108)
CO2 SERPL-SCNC: 26 MMOL/L — SIGNIFICANT CHANGE UP (ref 22–31)
COLOR SPEC: YELLOW — SIGNIFICANT CHANGE UP
CREAT SERPL-MCNC: 0.71 MG/DL — SIGNIFICANT CHANGE UP (ref 0.5–1.3)
DIFF PNL FLD: NEGATIVE — SIGNIFICANT CHANGE UP
EPI CELLS # UR: NEGATIVE — SIGNIFICANT CHANGE UP
GLUCOSE SERPL-MCNC: 90 MG/DL — SIGNIFICANT CHANGE UP (ref 70–99)
GLUCOSE UR QL: NEGATIVE MG/DL — SIGNIFICANT CHANGE UP
HCG SERPL-ACNC: 3 MIU/ML — SIGNIFICANT CHANGE UP
HCT VFR BLD CALC: 35 % — SIGNIFICANT CHANGE UP (ref 34.5–45)
HGB BLD-MCNC: 11.6 G/DL — SIGNIFICANT CHANGE UP (ref 11.5–15.5)
KETONES UR-MCNC: NEGATIVE — SIGNIFICANT CHANGE UP
LEUKOCYTE ESTERASE UR-ACNC: ABNORMAL
LIDOCAIN IGE QN: 233 U/L — SIGNIFICANT CHANGE UP (ref 73–393)
MCHC RBC-ENTMCNC: 27.6 PG — SIGNIFICANT CHANGE UP (ref 27–34)
MCHC RBC-ENTMCNC: 33.1 GM/DL — SIGNIFICANT CHANGE UP (ref 32–36)
MCV RBC AUTO: 83.3 FL — SIGNIFICANT CHANGE UP (ref 80–100)
NITRITE UR-MCNC: POSITIVE
NRBC # BLD: 0 /100 WBCS — SIGNIFICANT CHANGE UP (ref 0–0)
PH UR: 6.5 — SIGNIFICANT CHANGE UP (ref 5–8)
PLATELET # BLD AUTO: 263 K/UL — SIGNIFICANT CHANGE UP (ref 150–400)
POTASSIUM SERPL-MCNC: 3.7 MMOL/L — SIGNIFICANT CHANGE UP (ref 3.5–5.3)
POTASSIUM SERPL-SCNC: 3.7 MMOL/L — SIGNIFICANT CHANGE UP (ref 3.5–5.3)
PROT SERPL-MCNC: 7.3 G/DL — SIGNIFICANT CHANGE UP (ref 6–8.3)
PROT UR-MCNC: NEGATIVE MG/DL — SIGNIFICANT CHANGE UP
RBC # BLD: 4.2 M/UL — SIGNIFICANT CHANGE UP (ref 3.8–5.2)
RBC # FLD: 12.9 % — SIGNIFICANT CHANGE UP (ref 10.3–14.5)
RBC CASTS # UR COMP ASSIST: SIGNIFICANT CHANGE UP /HPF (ref 0–4)
SODIUM SERPL-SCNC: 139 MMOL/L — SIGNIFICANT CHANGE UP (ref 135–145)
SP GR SPEC: 1.01 — SIGNIFICANT CHANGE UP (ref 1.01–1.02)
UROBILINOGEN FLD QL: NEGATIVE MG/DL — SIGNIFICANT CHANGE UP
WBC # BLD: 6.13 K/UL — SIGNIFICANT CHANGE UP (ref 3.8–10.5)
WBC # FLD AUTO: 6.13 K/UL — SIGNIFICANT CHANGE UP (ref 3.8–10.5)
WBC UR QL: SIGNIFICANT CHANGE UP

## 2021-08-20 PROCEDURE — 93010 ELECTROCARDIOGRAM REPORT: CPT

## 2021-08-20 PROCEDURE — 99285 EMERGENCY DEPT VISIT HI MDM: CPT

## 2021-08-20 PROCEDURE — 76705 ECHO EXAM OF ABDOMEN: CPT | Mod: 26,RT

## 2021-08-20 NOTE — ED ADULT TRIAGE NOTE - CHIEF COMPLAINT QUOTE
RUQ pain w/ burping since this afternoon. hx reflux. Took medication w/out relief. nausea, denies vomiting. tried to force herself to vomit and c/o midsternal chest pain. denies chest pain at triage.

## 2021-08-20 NOTE — ED ADULT TRIAGE NOTE - SPO2 (%)
[facility] Emergency Department    [address]    [facility] [address]    Phone:  [phone]                                       Court Salazar   MRN: [id number]    Department:  [facility] Emergency Department   Date of Visit:  9/3/2017           After Visit Summary Signature Page     I have received my discharge instructions, and my questions have been answered. I have discussed any challenges I see with this plan with the nurse or doctor.    ..........................................................................................................................................  Patient/Patient Representative Signature      ..........................................................................................................................................  Patient Representative Print Name and Relationship to Patient    ..................................................               ................................................  Date                                            Time    ..........................................................................................................................................  Reviewed by Signature/Title    ...................................................              ..............................................  Date                                                            Time           98

## 2021-08-20 NOTE — ED ADULT NURSE NOTE - DATE OF LAST VACCINATION
EMG E [facility]  [address] [facility] [address] [phone]               Thank you for choosing us for your health care visit with Mago Tomlinson PA-C.   We are glad to serve you and happy to provide you with this summary of yo Call your healthcare provider if any of the following occur:  · Wound reopens or bleeds  · Signs of an infection, such as:  ¨ Increasing redness or swelling around the wound  ¨ Increased warmth from the wound  ¨ Worsening pain  ¨ Red streaking lines away f [employer]     Sign up for [email or web address], your secure online medical record. [employer] will allow you to access patient instructions from your recent visit,  view other health information, and more. To sign up or find more information, go to [email or web address] 20-May-2021

## 2021-08-20 NOTE — ED ADULT NURSE NOTE - OBJECTIVE STATEMENT
patient has c/o right upper abdomen pain for awhile, also has hx of gallstone, patient stated pain got worse after lunch, IV accessed and tolerating. Labs drawn & sent results pending. Pt is in no acute distress. Patient denies sick contacts/ no fever/chills/cough at this time, denies SOB and no acute distress. will continue to monitor.

## 2021-08-20 NOTE — ED ADULT NURSE NOTE - NSIMPLEMENTINTERV_GEN_ALL_ED
Implemented All Universal Safety Interventions:  [place] to call system. Call bell, personal items and telephone within reach. Instruct patient to call for assistance. Room bathroom lighting operational. Non-slip footwear when patient is off stretcher. Physically safe environment: no spills, clutter or unnecessary equipment. Stretcher in lowest position, wheels locked, appropriate side rails in place.

## 2021-08-21 VITALS
TEMPERATURE: 98 F | OXYGEN SATURATION: 98 % | RESPIRATION RATE: 18 BRPM | HEART RATE: 70 BPM | SYSTOLIC BLOOD PRESSURE: 102 MMHG | DIASTOLIC BLOOD PRESSURE: 66 MMHG

## 2021-08-21 PROCEDURE — 93005 ELECTROCARDIOGRAM TRACING: CPT

## 2021-08-21 PROCEDURE — 84702 CHORIONIC GONADOTROPIN TEST: CPT

## 2021-08-21 PROCEDURE — 36415 COLL VENOUS BLD VENIPUNCTURE: CPT

## 2021-08-21 PROCEDURE — 85027 COMPLETE CBC AUTOMATED: CPT

## 2021-08-21 PROCEDURE — 81001 URINALYSIS AUTO W/SCOPE: CPT

## 2021-08-21 PROCEDURE — 83690 ASSAY OF LIPASE: CPT

## 2021-08-21 PROCEDURE — 80053 COMPREHEN METABOLIC PANEL: CPT

## 2021-08-21 PROCEDURE — 99284 EMERGENCY DEPT VISIT MOD MDM: CPT | Mod: 25

## 2021-08-21 PROCEDURE — 82150 ASSAY OF AMYLASE: CPT

## 2021-08-21 PROCEDURE — 76705 ECHO EXAM OF ABDOMEN: CPT

## 2021-08-21 RX ORDER — ACETAMINOPHEN 500 MG
650 TABLET ORAL ONCE
Refills: 0 | Status: COMPLETED | OUTPATIENT
Start: 2021-08-21 | End: 2021-08-21

## 2021-08-21 RX ADMIN — Medication 650 MILLIGRAM(S): at 00:50

## 2021-08-21 RX ADMIN — Medication 650 MILLIGRAM(S): at 00:46

## 2021-08-21 NOTE — ED PROVIDER NOTE - SIGNIFICANT NEGATIVE FINDINGS
no headache, no chest pain, no SOB, no palpitations, no n/v/d, no fever, no chills, no urinary symptoms, no bleeding. no neuro changes.

## 2021-08-21 NOTE — ED PROVIDER NOTE - CLINICAL SUMMARY MEDICAL DECISION MAKING FREE TEXT BOX
RUQ pain after a fatty meal ....  US gall bladder with  stones,  no sign of cholecystitis   DC with surgical follow up

## 2021-08-21 NOTE — ED PROVIDER NOTE - OBJECTIVE STATEMENT
55 y/o female  with RUQ after cheese meal , by the time she came to the ED, her symptoms relaxed, no fever, no chills, no CP, no SOB , no N/V, no urinary symptoms.

## 2021-08-21 NOTE — ED PROVIDER NOTE - PATIENT PORTAL LINK FT
You can access the FollowMyHealth Patient Portal offered by [facility] by registering at the following website: [email or web address]. By joining [employer]’s FollowMyHealth portal, you will also be able to view your health information using other applications (apps) compatible with our system.

## 2021-08-21 NOTE — ED PROVIDER NOTE - CARE PROVIDER_API CALL
Shahab Almeida)  Surgery  [address] [place] [address]  Phone: [phone]  Fax: [phone]  Follow Up Time: 1-3 Days

## 2021-08-31 ENCOUNTER — APPOINTMENT (OUTPATIENT)
Dept: SURGERY | Facility: CLINIC | Age: 56
End: 2021-08-31
Payer: MEDICAID

## 2021-08-31 VITALS
DIASTOLIC BLOOD PRESSURE: 83 MMHG | HEART RATE: 81 BPM | WEIGHT: 168 LBS | HEIGHT: 59 IN | SYSTOLIC BLOOD PRESSURE: 132 MMHG | OXYGEN SATURATION: 97 % | BODY MASS INDEX: 33.87 KG/M2

## 2021-08-31 VITALS
HEART RATE: 71 BPM | WEIGHT: 129 LBS | BODY MASS INDEX: 27.83 KG/M2 | HEIGHT: 57 IN | OXYGEN SATURATION: 98 % | SYSTOLIC BLOOD PRESSURE: 116 MMHG | DIASTOLIC BLOOD PRESSURE: 84 MMHG

## 2021-08-31 PROCEDURE — 99203 OFFICE O/P NEW LOW 30 MIN: CPT

## 2021-09-13 ENCOUNTER — OUTPATIENT (OUTPATIENT)
Dept: OUTPATIENT SERVICES | Facility: HOSPITAL | Age: 56
LOS: 1 days | End: 2021-09-13
Payer: MEDICAID

## 2021-09-13 VITALS
TEMPERATURE: 97 F | HEART RATE: 65 BPM | DIASTOLIC BLOOD PRESSURE: 72 MMHG | SYSTOLIC BLOOD PRESSURE: 121 MMHG | RESPIRATION RATE: 16 BRPM | WEIGHT: 126.1 LBS | OXYGEN SATURATION: 98 %

## 2021-09-13 DIAGNOSIS — Z01.818 ENCOUNTER FOR OTHER PREPROCEDURAL EXAMINATION: ICD-10-CM

## 2021-09-13 DIAGNOSIS — K80.10 CALCULUS OF GALLBLADDER WITH CHRONIC CHOLECYSTITIS WITHOUT OBSTRUCTION: ICD-10-CM

## 2021-09-13 DIAGNOSIS — Z98.890 OTHER SPECIFIED POSTPROCEDURAL STATES: Chronic | ICD-10-CM

## 2021-09-13 LAB
ALBUMIN SERPL ELPH-MCNC: 3.8 G/DL — SIGNIFICANT CHANGE UP (ref 3.3–5)
ALP SERPL-CCNC: 94 U/L — SIGNIFICANT CHANGE UP (ref 40–120)
ALT FLD-CCNC: 23 U/L — SIGNIFICANT CHANGE UP (ref 12–78)
ANION GAP SERPL CALC-SCNC: 5 MMOL/L — SIGNIFICANT CHANGE UP (ref 5–17)
AST SERPL-CCNC: 15 U/L — SIGNIFICANT CHANGE UP (ref 15–37)
BILIRUB SERPL-MCNC: 0.6 MG/DL — SIGNIFICANT CHANGE UP (ref 0.2–1.2)
BLD GP AB SCN SERPL QL: SIGNIFICANT CHANGE UP
BUN SERPL-MCNC: 12 MG/DL — SIGNIFICANT CHANGE UP (ref 7–23)
CALCIUM SERPL-MCNC: 9.1 MG/DL — SIGNIFICANT CHANGE UP (ref 8.5–10.1)
CHLORIDE SERPL-SCNC: 107 MMOL/L — SIGNIFICANT CHANGE UP (ref 96–108)
CO2 SERPL-SCNC: 28 MMOL/L — SIGNIFICANT CHANGE UP (ref 22–31)
CREAT SERPL-MCNC: 0.7 MG/DL — SIGNIFICANT CHANGE UP (ref 0.5–1.3)
GLUCOSE SERPL-MCNC: 65 MG/DL — LOW (ref 70–99)
HCT VFR BLD CALC: 37.8 % — SIGNIFICANT CHANGE UP (ref 34.5–45)
HGB BLD-MCNC: 12.1 G/DL — SIGNIFICANT CHANGE UP (ref 11.5–15.5)
MCHC RBC-ENTMCNC: 26.9 PG — LOW (ref 27–34)
MCHC RBC-ENTMCNC: 32 GM/DL — SIGNIFICANT CHANGE UP (ref 32–36)
MCV RBC AUTO: 84 FL — SIGNIFICANT CHANGE UP (ref 80–100)
NRBC # BLD: 0 /100 WBCS — SIGNIFICANT CHANGE UP (ref 0–0)
PLATELET # BLD AUTO: 285 K/UL — SIGNIFICANT CHANGE UP (ref 150–400)
POTASSIUM SERPL-MCNC: 3.8 MMOL/L — SIGNIFICANT CHANGE UP (ref 3.5–5.3)
POTASSIUM SERPL-SCNC: 3.8 MMOL/L — SIGNIFICANT CHANGE UP (ref 3.5–5.3)
PROT SERPL-MCNC: 8.1 G/DL — SIGNIFICANT CHANGE UP (ref 6–8.3)
RBC # BLD: 4.5 M/UL — SIGNIFICANT CHANGE UP (ref 3.8–5.2)
RBC # FLD: 13.1 % — SIGNIFICANT CHANGE UP (ref 10.3–14.5)
SODIUM SERPL-SCNC: 140 MMOL/L — SIGNIFICANT CHANGE UP (ref 135–145)
WBC # BLD: 5.79 K/UL — SIGNIFICANT CHANGE UP (ref 3.8–10.5)
WBC # FLD AUTO: 5.79 K/UL — SIGNIFICANT CHANGE UP (ref 3.8–10.5)

## 2021-09-13 PROCEDURE — 86850 RBC ANTIBODY SCREEN: CPT

## 2021-09-13 PROCEDURE — 86900 BLOOD TYPING SEROLOGIC ABO: CPT

## 2021-09-13 PROCEDURE — 86901 BLOOD TYPING SEROLOGIC RH(D): CPT

## 2021-09-13 PROCEDURE — 36415 COLL VENOUS BLD VENIPUNCTURE: CPT

## 2021-09-13 PROCEDURE — 85027 COMPLETE CBC AUTOMATED: CPT

## 2021-09-13 PROCEDURE — 80053 COMPREHEN METABOLIC PANEL: CPT

## 2021-09-13 PROCEDURE — G0463: CPT

## 2021-09-13 NOTE — H&P PST ADULT - NSICDXFAMILYHX_GEN_ALL_CORE_FT
FAMILY HISTORY:  Father  Still living? No  FH: stroke, Age at diagnosis: Age Unknown    Mother  Still living? Yes, Estimated age: Age Unknown  Family history of type 2 diabetes mellitus in mother, Age at diagnosis: Age Unknown

## 2021-09-13 NOTE — H&P PST ADULT - NSICDXPASTMEDICALHX_GEN_ALL_CORE_FT
PAST MEDICAL HISTORY:  No pertinent past medical history      PAST MEDICAL HISTORY:  Calculus of gallbladder with chronic cholecystitis without obstruction

## 2021-09-13 NOTE — H&P PST ADULT - GENERAL COMMENTS
Pt denies having traveled outside the country for the last 14 days. Pt denies having had the COVID19 infection and denies COVID19 positive contacts within the last 14 days. Pt received the 2nd dose of the COVID19 vaccine on 4/1/2021. Pt denies having traveled outside the country for the last 14 days. Pt denies having had the COVID19 infection and denies COVID19 positive contacts within the last 14 days. Pt received the 2nd dose of the COVID19 vaccine on 4/17/2021. Pt denies having traveled outside the country for the last 14 days. Pt denies having had the COVID19 infection and denies COVID19 positive contacts within the last 14 days. Pt received the 2nd dose of the Moderna COVID19 vaccine on 4/17/2021.

## 2021-09-13 NOTE — H&P PST ADULT - NSICDXPASTSURGICALHX_GEN_ALL_CORE_FT
PAST SURGICAL HISTORY:  H/O removal of cyst (Right arm, benign)    S/P hernia surgery (Left IHR, 2006)

## 2021-09-13 NOTE — H&P PST ADULT - GENITOURINARY COMMENTS
Pt states she was Pt states she was treated for UTI and completed antibiotic end of August, no urinary complaints today.

## 2021-09-13 NOTE — H&P PST ADULT - HISTORY OF PRESENT ILLNESS
57 y/o female with no PMH here for PST. Pt states she has been having RUQ abdominal pain for the last 5-6 months. Pt was seen in ER at [facility] on 8/20/21 and diagnosed with cholelithiasis. Pt denies further abdominal pain "since I have been watching my diet". Pt denies n/v/d and abdominal pain. Pt electing for laparoscopic, possible open cholecystectomy on 9/20/21.

## 2021-09-13 NOTE — H&P PST ADULT - PROBLEM SELECTOR PLAN 2
Medical clearance needed as per surgeon. CBC, Comprehensive panel and T&S ordered. EKG received from 8/20/21 in chart. Pre-op instructions and surgical scrubs given and pt verbalized understanding.

## 2021-09-15 PROBLEM — K80.10 CALCULUS OF GALLBLADDER WITH CHRONIC CHOLECYSTITIS WITHOUT OBSTRUCTION: Chronic | Status: ACTIVE | Noted: 2021-09-13

## 2021-09-17 ENCOUNTER — OUTPATIENT (OUTPATIENT)
Dept: OUTPATIENT SERVICES | Facility: HOSPITAL | Age: 56
LOS: 1 days | End: 2021-09-17
Payer: MEDICAID

## 2021-09-17 DIAGNOSIS — Z98.890 OTHER SPECIFIED POSTPROCEDURAL STATES: Chronic | ICD-10-CM

## 2021-09-17 DIAGNOSIS — Z20.828 CONTACT WITH AND (SUSPECTED) EXPOSURE TO OTHER VIRAL COMMUNICABLE DISEASES: ICD-10-CM

## 2021-09-17 LAB — SARS-COV-2 RNA SPEC QL NAA+PROBE: SIGNIFICANT CHANGE UP

## 2021-09-17 PROCEDURE — U0003: CPT

## 2021-09-17 PROCEDURE — U0005: CPT

## 2021-09-17 NOTE — ASU PATIENT PROFILE, ADULT - ABLE TO REACH PT
left message 12:45 arrival/no left message 12:45 arrival, follow pre procedure instruction sheet/no [phone] left message 12:45 arrival, follow pre procedure instruction sheet/no

## 2021-09-19 ENCOUNTER — TRANSCRIPTION ENCOUNTER (OUTPATIENT)
Age: 56
End: 2021-09-19

## 2021-09-20 ENCOUNTER — OUTPATIENT (OUTPATIENT)
Dept: OUTPATIENT SERVICES | Facility: HOSPITAL | Age: 56
LOS: 1 days | End: 2021-09-20
Payer: MEDICAID

## 2021-09-20 ENCOUNTER — RESULT REVIEW (OUTPATIENT)
Age: 56
End: 2021-09-20

## 2021-09-20 ENCOUNTER — APPOINTMENT (OUTPATIENT)
Dept: SURGERY | Facility: HOSPITAL | Age: 56
End: 2021-09-20

## 2021-09-20 VITALS
DIASTOLIC BLOOD PRESSURE: 57 MMHG | RESPIRATION RATE: 14 BRPM | TEMPERATURE: 99 F | WEIGHT: 126.1 LBS | OXYGEN SATURATION: 100 % | SYSTOLIC BLOOD PRESSURE: 106 MMHG | HEART RATE: 86 BPM

## 2021-09-20 VITALS
OXYGEN SATURATION: 96 % | SYSTOLIC BLOOD PRESSURE: 105 MMHG | HEART RATE: 66 BPM | RESPIRATION RATE: 12 BRPM | DIASTOLIC BLOOD PRESSURE: 54 MMHG

## 2021-09-20 DIAGNOSIS — Z98.890 OTHER SPECIFIED POSTPROCEDURAL STATES: Chronic | ICD-10-CM

## 2021-09-20 DIAGNOSIS — K80.10 CALCULUS OF GALLBLADDER WITH CHRONIC CHOLECYSTITIS WITHOUT OBSTRUCTION: ICD-10-CM

## 2021-09-20 DIAGNOSIS — Z01.818 ENCOUNTER FOR OTHER PREPROCEDURAL EXAMINATION: ICD-10-CM

## 2021-09-20 PROCEDURE — 47562 LAPAROSCOPIC CHOLECYSTECTOMY: CPT

## 2021-09-20 PROCEDURE — 88304 TISSUE EXAM BY PATHOLOGIST: CPT | Mod: 26

## 2021-09-20 PROCEDURE — C1889: CPT

## 2021-09-20 PROCEDURE — 88304 TISSUE EXAM BY PATHOLOGIST: CPT

## 2021-09-20 PROCEDURE — 47562 LAPAROSCOPIC CHOLECYSTECTOMY: CPT | Mod: AS

## 2021-09-20 RX ORDER — HYDROMORPHONE HYDROCHLORIDE 2 MG/ML
0.5 INJECTION INTRAMUSCULAR; INTRAVENOUS; SUBCUTANEOUS
Refills: 0 | Status: DISCONTINUED | OUTPATIENT
Start: 2021-09-20 | End: 2021-09-20

## 2021-09-20 RX ORDER — OXYCODONE HYDROCHLORIDE 5 MG/1
5 TABLET ORAL ONCE
Refills: 0 | Status: DISCONTINUED | OUTPATIENT
Start: 2021-09-20 | End: 2021-09-20

## 2021-09-20 RX ORDER — METOCLOPRAMIDE HCL 10 MG
5 TABLET ORAL ONCE
Refills: 0 | Status: DISCONTINUED | OUTPATIENT
Start: 2021-09-20 | End: 2021-09-20

## 2021-09-20 RX ORDER — SODIUM CHLORIDE 9 MG/ML
1000 INJECTION, SOLUTION INTRAVENOUS
Refills: 0 | Status: DISCONTINUED | OUTPATIENT
Start: 2021-09-20 | End: 2021-09-20

## 2021-09-20 RX ORDER — OXYCODONE HYDROCHLORIDE 5 MG/1
1 TABLET ORAL
Qty: 15 | Refills: 0
Start: 2021-09-20

## 2021-09-20 RX ORDER — ACETAMINOPHEN 500 MG
2 TABLET ORAL
Qty: 0 | Refills: 0 | DISCHARGE

## 2021-09-20 RX ADMIN — HYDROMORPHONE HYDROCHLORIDE 0.5 MILLIGRAM(S): 2 INJECTION INTRAMUSCULAR; INTRAVENOUS; SUBCUTANEOUS at 15:57

## 2021-09-20 RX ADMIN — OXYCODONE HYDROCHLORIDE 5 MILLIGRAM(S): 5 TABLET ORAL at 16:31

## 2021-09-20 RX ADMIN — SODIUM CHLORIDE 75 MILLILITER(S): 9 INJECTION, SOLUTION INTRAVENOUS at 12:49

## 2021-09-20 RX ADMIN — HYDROMORPHONE HYDROCHLORIDE 0.5 MILLIGRAM(S): 2 INJECTION INTRAMUSCULAR; INTRAVENOUS; SUBCUTANEOUS at 16:15

## 2021-09-20 RX ADMIN — SODIUM CHLORIDE 100 MILLILITER(S): 9 INJECTION, SOLUTION INTRAVENOUS at 16:07

## 2021-09-20 NOTE — ASU PREOP CHECKLIST - BOWEL PREP
SEVERITY:- ABNORMAL ECG -

SINUS RHYTHM

PROBABLE LEFT VENTRICULAR HYPERTROPHY

:

Confirmed by: Cyn Fleming 28-Jul-2019 10:34:17 n/a

## 2021-09-20 NOTE — ASU DISCHARGE PLAN (ADULT/PEDIATRIC) - ASU DC SPECIAL INSTRUCTIONSFT
FOR THE NEXT 3 DAYS TAKE TYLENOL EXTRA STRENGTH 500 MG  EVERY 6 HOURS.   FOR THE NEXT 2 DAYS TAKE ALEVE 220 MG EVERY 12 HOURS.  FOLLOW UP WITH OFFICE  CALL FOR AN APPOINTMENT

## 2021-09-20 NOTE — ASU DISCHARGE PLAN (ADULT/PEDIATRIC) - CARE PROVIDER_API CALL
Shahab Almeida)  Surgery  [address] [place] [address]  Phone: [phone]  Fax: [phone]  Follow Up Time:

## 2021-09-23 ENCOUNTER — APPOINTMENT (OUTPATIENT)
Dept: SURGERY | Facility: CLINIC | Age: 56
End: 2021-09-23

## 2021-09-30 ENCOUNTER — APPOINTMENT (OUTPATIENT)
Dept: SURGERY | Facility: CLINIC | Age: 56
End: 2021-09-30
Payer: MEDICAID

## 2021-09-30 PROCEDURE — 99024 POSTOP FOLLOW-UP VISIT: CPT

## 2021-10-26 ENCOUNTER — APPOINTMENT (OUTPATIENT)
Dept: SURGERY | Facility: CLINIC | Age: 56
End: 2021-10-26

## 2021-11-11 ENCOUNTER — APPOINTMENT (OUTPATIENT)
Dept: SURGERY | Facility: CLINIC | Age: 56
End: 2021-11-11
Payer: MEDICAID

## 2021-11-11 VITALS
WEIGHT: 126 LBS | HEART RATE: 65 BPM | SYSTOLIC BLOOD PRESSURE: 108 MMHG | OXYGEN SATURATION: 97 % | HEIGHT: 57 IN | DIASTOLIC BLOOD PRESSURE: 74 MMHG | BODY MASS INDEX: 27.18 KG/M2

## 2021-11-11 PROCEDURE — 99024 POSTOP FOLLOW-UP VISIT: CPT

## 2021-11-16 ENCOUNTER — OUTPATIENT (OUTPATIENT)
Dept: OUTPATIENT SERVICES | Facility: HOSPITAL | Age: 56
LOS: 1 days | End: 2021-11-16
Payer: MEDICAID

## 2021-11-16 ENCOUNTER — RESULT REVIEW (OUTPATIENT)
Age: 56
End: 2021-11-16

## 2021-11-16 ENCOUNTER — APPOINTMENT (OUTPATIENT)
Dept: SURGERY | Facility: CLINIC | Age: 56
End: 2021-11-16
Payer: MEDICAID

## 2021-11-16 VITALS
DIASTOLIC BLOOD PRESSURE: 71 MMHG | BODY MASS INDEX: 28.26 KG/M2 | SYSTOLIC BLOOD PRESSURE: 104 MMHG | WEIGHT: 131 LBS | HEART RATE: 74 BPM | HEIGHT: 57 IN | OXYGEN SATURATION: 97 %

## 2021-11-16 DIAGNOSIS — K21.9 GASTRO-ESOPHAGEAL REFLUX DISEASE WITHOUT ESOPHAGITIS: ICD-10-CM

## 2021-11-16 DIAGNOSIS — Z98.890 OTHER SPECIFIED POSTPROCEDURAL STATES: Chronic | ICD-10-CM

## 2021-11-16 PROCEDURE — 99024 POSTOP FOLLOW-UP VISIT: CPT

## 2021-11-16 PROCEDURE — 74220 X-RAY XM ESOPHAGUS 1CNTRST: CPT

## 2021-11-16 PROCEDURE — 74220 X-RAY XM ESOPHAGUS 1CNTRST: CPT | Mod: 26

## 2021-12-06 NOTE — ED ADULT NURSE NOTE - JUGULAR VENOUS DISTENTION
[facility] Transitions ED Follow Up Call    2021    Patient: Mo Hartman Patient : 1986   MRN: [id number]  Reason for Admission: anterior dislocation of the humerus  Discharge Date: 2021     Pt doing well today, she has not needed any pain medication she said, is wearing her sling and swath. She has a 9 month old at home, she is not using her injured side to carry her baby. She had made her follow up with Dr Radha Clements this Friday on 21. Pt said she has no needs today, she is agreeable to follow up by ACM.    Care Transitions ED Follow Up    Care Transitions Interventions  Do you have any ongoing symptoms?: No   Do you have a copy of your discharge instructions?: Yes   Do you understand what to report and when to return?: Yes   Are you following your discharge instructions?: Yes   Do you have all of your prescriptions and are they filled?: Yes   Have you scheduled your follow up appointment?: Yes   How are you going to get to your appointment?: Car - family or friend to transport   Were you discharged with any Home Care or [address] [place] [address] or do you currently have any active services?: No absent

## 2021-12-07 ENCOUNTER — APPOINTMENT (OUTPATIENT)
Dept: GASTROENTEROLOGY | Facility: HOSPITAL | Age: 56
End: 2021-12-07

## 2021-12-26 ENCOUNTER — OUTPATIENT (OUTPATIENT)
Dept: OUTPATIENT SERVICES | Facility: HOSPITAL | Age: 56
LOS: 1 days | End: 2021-12-26
Payer: MEDICAID

## 2021-12-26 DIAGNOSIS — Z98.890 OTHER SPECIFIED POSTPROCEDURAL STATES: Chronic | ICD-10-CM

## 2021-12-26 DIAGNOSIS — Z20.828 CONTACT WITH AND (SUSPECTED) EXPOSURE TO OTHER VIRAL COMMUNICABLE DISEASES: ICD-10-CM

## 2021-12-26 LAB — SARS-COV-2 RNA SPEC QL NAA+PROBE: SIGNIFICANT CHANGE UP

## 2021-12-26 PROCEDURE — U0003: CPT

## 2021-12-26 PROCEDURE — U0005: CPT

## 2021-12-26 PROCEDURE — C9803: CPT

## 2021-12-27 DIAGNOSIS — Z20.828 CONTACT WITH AND (SUSPECTED) EXPOSURE TO OTHER VIRAL COMMUNICABLE DISEASES: ICD-10-CM

## 2021-12-30 ENCOUNTER — OUTPATIENT (OUTPATIENT)
Dept: OUTPATIENT SERVICES | Facility: HOSPITAL | Age: 56
LOS: 1 days | End: 2021-12-30
Payer: MEDICAID

## 2021-12-30 DIAGNOSIS — Z20.828 CONTACT WITH AND (SUSPECTED) EXPOSURE TO OTHER VIRAL COMMUNICABLE DISEASES: ICD-10-CM

## 2021-12-30 DIAGNOSIS — Z98.890 OTHER SPECIFIED POSTPROCEDURAL STATES: Chronic | ICD-10-CM

## 2021-12-30 LAB — SARS-COV-2 RNA SPEC QL NAA+PROBE: SIGNIFICANT CHANGE UP

## 2021-12-30 PROCEDURE — U0005: CPT

## 2021-12-30 PROCEDURE — U0003: CPT

## 2021-12-30 PROCEDURE — C9803: CPT

## 2021-12-31 DIAGNOSIS — Z20.828 CONTACT WITH AND (SUSPECTED) EXPOSURE TO OTHER VIRAL COMMUNICABLE DISEASES: ICD-10-CM

## 2022-01-08 ENCOUNTER — APPOINTMENT (OUTPATIENT)
Dept: MAMMOGRAPHY | Facility: CLINIC | Age: 57
End: 2022-01-08
Payer: MEDICAID

## 2022-01-08 ENCOUNTER — OUTPATIENT (OUTPATIENT)
Dept: OUTPATIENT SERVICES | Facility: HOSPITAL | Age: 57
LOS: 1 days | End: 2022-01-08
Payer: MEDICAID

## 2022-01-08 DIAGNOSIS — Z98.890 OTHER SPECIFIED POSTPROCEDURAL STATES: Chronic | ICD-10-CM

## 2022-01-08 DIAGNOSIS — Z00.8 ENCOUNTER FOR OTHER GENERAL EXAMINATION: ICD-10-CM

## 2022-01-08 PROCEDURE — 77067 SCR MAMMO BI INCL CAD: CPT

## 2022-01-08 PROCEDURE — 77063 BREAST TOMOSYNTHESIS BI: CPT

## 2022-01-08 PROCEDURE — 77063 BREAST TOMOSYNTHESIS BI: CPT | Mod: 26

## 2022-01-08 PROCEDURE — 77067 SCR MAMMO BI INCL CAD: CPT | Mod: 26

## 2022-01-27 ENCOUNTER — TRANSCRIPTION ENCOUNTER (OUTPATIENT)
Age: 57
End: 2022-01-27

## 2022-02-14 ENCOUNTER — TRANSCRIPTION ENCOUNTER (OUTPATIENT)
Age: 57
End: 2022-02-14

## 2022-03-02 ENCOUNTER — LABORATORY RESULT (OUTPATIENT)
Age: 57
End: 2022-03-02

## 2022-03-02 ENCOUNTER — APPOINTMENT (OUTPATIENT)
Dept: RHEUMATOLOGY | Facility: CLINIC | Age: 57
End: 2022-03-02
Payer: MEDICAID

## 2022-03-02 VITALS
TEMPERATURE: 97.6 F | OXYGEN SATURATION: 99 % | BODY MASS INDEX: 26.97 KG/M2 | WEIGHT: 125 LBS | DIASTOLIC BLOOD PRESSURE: 72 MMHG | HEIGHT: 57 IN | HEART RATE: 69 BPM | SYSTOLIC BLOOD PRESSURE: 118 MMHG

## 2022-03-02 DIAGNOSIS — M25.579 PAIN IN UNSPECIFIED ANKLE AND JOINTS OF UNSPECIFIED FOOT: ICD-10-CM

## 2022-03-02 DIAGNOSIS — M19.90 UNSPECIFIED OSTEOARTHRITIS, UNSPECIFIED SITE: ICD-10-CM

## 2022-03-02 DIAGNOSIS — Z82.61 FAMILY HISTORY OF ARTHRITIS: ICD-10-CM

## 2022-03-02 DIAGNOSIS — Z87.2 PERSONAL HISTORY OF DISEASES OF THE SKIN AND SUBCUTANEOUS TISSUE: ICD-10-CM

## 2022-03-02 DIAGNOSIS — M72.2 PLANTAR FASCIAL FIBROMATOSIS: ICD-10-CM

## 2022-03-02 PROCEDURE — 99204 OFFICE O/P NEW MOD 45 MIN: CPT | Mod: 25

## 2022-03-02 PROCEDURE — 96372 THER/PROPH/DIAG INJ SC/IM: CPT

## 2022-03-02 RX ORDER — LEVOCETIRIZINE DIHYDROCHLORIDE 5 MG/1
5 TABLET ORAL DAILY
Qty: 30 | Refills: 4 | Status: DISCONTINUED | COMMUNITY
Start: 2020-06-15 | End: 2022-03-02

## 2022-03-02 RX ORDER — AZELASTINE HYDROCHLORIDE 137 UG/1
0.1 SPRAY, METERED NASAL TWICE DAILY
Qty: 1 | Refills: 5 | Status: DISCONTINUED | COMMUNITY
Start: 2020-06-15 | End: 2022-03-02

## 2022-03-02 RX ORDER — LEVOFLOXACIN 500 MG/1
500 TABLET, FILM COATED ORAL DAILY
Qty: 10 | Refills: 1 | Status: DISCONTINUED | COMMUNITY
Start: 2019-04-01 | End: 2022-03-02

## 2022-03-02 RX ORDER — CEFDINIR 300 MG/1
300 CAPSULE ORAL
Qty: 20 | Refills: 2 | Status: DISCONTINUED | COMMUNITY
Start: 2019-04-29 | End: 2022-03-02

## 2022-03-02 RX ORDER — PREDNISONE 10 MG/1
10 TABLET ORAL
Qty: 30 | Refills: 1 | Status: DISCONTINUED | COMMUNITY
Start: 2019-04-01 | End: 2022-03-02

## 2022-03-02 RX ORDER — AZELASTINE HYDROCHLORIDE 137 UG/1
0.1 SPRAY, METERED NASAL TWICE DAILY
Qty: 1 | Refills: 5 | Status: DISCONTINUED | COMMUNITY
Start: 2019-04-29 | End: 2022-03-02

## 2022-03-02 RX ORDER — DOXYCYCLINE HYCLATE 100 MG/1
100 CAPSULE ORAL
Qty: 28 | Refills: 1 | Status: DISCONTINUED | COMMUNITY
Start: 2020-06-15 | End: 2022-03-02

## 2022-03-02 RX ORDER — CEFDINIR 300 MG/1
300 CAPSULE ORAL
Qty: 20 | Refills: 2 | Status: DISCONTINUED | COMMUNITY
Start: 2019-09-23 | End: 2022-03-02

## 2022-03-02 NOTE — CONSULT LETTER
[Dear  ___] : Dear  [unfilled], [Consult Letter:] : I had the pleasure of evaluating your patient, [unfilled]. [Consult Closing:] : Thank you very much for allowing me to participate in the care of this patient.  If you have any questions, please do not hesitate to contact me. 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 Vasculitis vs. Hemochromatosis vs. 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 FH of arthritis is aunt (unknown?) No family hx of Psoriasis/PSA.\par On exam today + achilles tendonitis + plantar fasciitis, R wrist and MCP/ MTP inflammation \par - Hemochromatosis: classic presentation of Right 2nd and 3rd MCP swelling and redness, ferritin/iron studies to rule out \par - Vasculitis: no numbness/weakness, no purpura/nodules/sores, ANCA, Proteinaise 3, Myeloperoxidase Ab to rule out \par - Other Spondyloarthropathy: + back pain (worse am/ better w/ movement), but no change in ROM and normal chest expansion, HLAB27 to rule out\par - Also other rheum studies to consider, labs today. \par \par Discussion: discussed with patient that this  an active inflammatory arthritis, and that methylprednisolone injection IM today can improve symptoms, prednisone to be taken for increased comfort, and will consider MTX (literature provided today.) as steroid sparing agent until more clear understanding of precise etiology. \par \par Plan:\par - Methylprednisolone injection today for immediate response\par - XR of hands and SI joints \par - Labs asap \par - Prednisone 10 mg daily (5 mg BID) until reevaluation for improvement of symptoms \par - To consider adding MTX, literature provided today \par - Follow-up on 03/30/22 and 2 months after that.  [FreeTextEntry3] : Sheela Diaz DNP, ANP-C\par Division or Rheumatology\par [facility]\par \par  [DrNilay  ___] : Dr. COLÓN [DrNilay ___] : Dr. COLÓN

## 2022-03-02 NOTE — HISTORY OF PRESENT ILLNESS
[FreeTextEntry1] : (Initial HPI  3/2/22) \par 56 yo female coming in for consult for long-standing hx of polyarthropathy. \par \par - 2 years of joint pain and swelling, worsens with raining involving R wrist, R elbow, MCPs bilateral R > L, bilateral shoulders, bilateral knees, R ankle. \par - Morning stiffness for 10 minutes approx. \par - + Back pain worse in the AM, lower. Feels better in 5 minutes. \par - Takes OTC Tylenol/Motrin/Aleve with minimal improvement. \par - Ankle swelling has been assessed with XR, MRI - reportedly nothing remarkable. \par - Plantar fasciitis bilateral dx, has tried steroid injection in bilateral feet with some improvement, but couldn't pursue this option long-term. \par - Dx with arthritis, [place] - labs done recently - was told arthritis might be of rheum. origin\par - Dx with psoriasis a few years ago, hydrocortisone cream improved rash, was on left anterior ankle/distal leg   \par \par - Hx of hearing loss (not complete) in left ear - unknown origin. (took high dose of steroids without improvement in the past)\par  - Denies trigger finger, swelling in fingers, toe swelling, vision changes, diarrhea, abd pain, red eyes, night sweats, lymphadenopathy, chest pain, SOB, pleurisy. \par \par Social: \par - P 3 G 3 (oldest 35 y.o), , lives alone. \par - Non-smoker, social drinker \par \par FH + history of arthritis (aunt), no family hx of psoriasis

## 2022-03-02 NOTE — ASSESSMENT
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 Vasculitis vs. Hemochromatosis vs. 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 FH of arthritis is aunt (unknown?) No family hx of Psoriasis/PSA.\par On exam today + achilles tendonitis + plantar fasciitis, R wrist and MCP/ MTP inflammation \par - Hemochromatosis: classic presentation of Right 2nd and 3rd MCP swelling and redness, ferritin/iron studies to rule out \par - Vasculitis: no numbness/weakness, no purpura/nodules/sores, ANCA, Proteinaise 3, Myeloperoxidase Ab to rule out \par - Other Spondyloarthropathy: + back pain (worse am/ better w/ movement), but no change in ROM and normal chest expansion, HLAB27 to rule out\par - Also other rheum studies to consider, labs today. \par \par Discussion: discussed with patient that this  an active inflammatory arthritis, and that methylprednisolone injection IM today can improve symptoms, prednisone to be taken for increased comfort, and will consider MTX (literature provided today.) as steroid sparing agent until more clear understanding of precise etiology. \par \par Plan:\par - Methylprednisolone injection today for immediate response\par - XR of hands and SI joints \par - Labs asap \par - Prednisone 10 mg daily (5 mg BID) until reevaluation for improvement of symptoms \par - To consider adding MTX, literature provided today \par - Follow-up on 03/30/22 and 2 months after that.

## 2022-03-02 NOTE — DATA REVIEWED
[FreeTextEntry1] : Need copies of:\par - Recent labs done from Dr. Ayala ?\par - XR and MRI of Right ankle, reportedly unremarkable

## 2022-03-02 NOTE — PROCEDURE
[Today's Date:] : Date: [unfilled] [Patient] : the patient [Risks] : risks [Benefits] : benefits [Alternatives] : alternatives [Consent Obtained] : written consent was obtained prior to the procedure and is detailed in the patient's record [Therapeutic] : therapeutic [#1 Site: ______] : #1 site identified in the [unfilled] [Alcohol] : alcohol [25 gauge 1.5 inch] : A 25 gauge 1.5 inch needle was used [___ml Steriod Preparation] : [unfilled] ml of steriod preparation  [Tolerated Well] : the patient tolerated the procedure well [Instructions Given] : handouts/patient instructions were given to patient [Patient Instructed to Call] : patient was instructed to call if redness at site, a decrease in range of motion or an increase in pain is noted after procedure. [FreeTextEntry1] : \par  This is a strong steroid.. can cause anxiety, hungry, irritable, trouble sleeping, rarely causes palpitations or chest pain but if present go to ER and then let me know \par \par

## 2022-03-02 NOTE — REVIEW OF SYSTEMS
[Feeling Poorly] : feeling poorly [Loss Of Hearing] : hearing loss [Heartburn] : heartburn [Arthralgias] : arthralgias [Joint Pain] : joint pain [Joint Swelling] : joint swelling [Joint Stiffness] : joint stiffness [As Noted in HPI] : as noted in HPI [Negative] : Heme/Lymph [Fever] : no fever [Chills] : no chills [Eye Pain] : no eye pain [Red Eyes] : eyes not red [Eyesight Problems] : no eyesight problems [Dry Eyes] : no dryness of the eyes [Chest Pain] : no chest pain [Palpitations] : no palpitations [Shortness Of Breath] : no shortness of breath [Wheezing] : no wheezing [Cough] : no cough [SOB on Exertion] : no shortness of breath during exertion [Abdominal Pain] : no abdominal pain [Vomiting] : no vomiting [Constipation] : no constipation [Diarrhea] : no diarrhea [Confused] : no confusion [Limb Weakness] : no limb weakness [Difficulty Walking] : no difficulty walking [FreeTextEntry2] : frustration related to ongoing arthropathy  [FreeTextEntry4] : unknown origin of Left sided hearing loss w/ chronic tinnitus [FreeTextEntry5] : varicosities working w/ vascular sx something done L 2021- nothing on R  [de-identified] : no recent psoriasis episodes (only 1  episode previously)  [FreeTextEntry7] : eventually had gallbladder resection 12/21 w/ + response, symptoms resolved [de-identified] : denies any muscular weakness

## 2022-03-02 NOTE — PHYSICAL EXAM
[General Appearance - Alert] : alert [General Appearance - In No Acute Distress] : in no acute distress [General Appearance - Well Nourished] : well nourished [General Appearance - Well Developed] : well developed [General Appearance - Well-Appearing] : healthy appearing [Sclera] : the sclera and conjunctiva were normal [Extraocular Movements] : extraocular movements were intact [Outer Ear] : the ears and nose were normal in appearance [Examination Of The Oral Cavity] : the lips and gums were normal [Nasal Cavity] : the nasal mucosa and septum were normal [Oropharynx] : the oropharynx was normal [Neck Appearance] : the appearance of the neck was normal [Exaggerated Use Of Accessory Muscles For Inspiration] : no accessory muscle use [Respiration, Rhythm And Depth] : normal respiratory rhythm and effort [Auscultation Breath Sounds / Voice Sounds] : lungs were clear to auscultation bilaterally [Heart Rate And Rhythm] : heart rate was normal and rhythm regular [Heart Sounds] : normal S1 and S2 [Heart Sounds Gallop] : no gallops [Murmurs] : no murmurs [Heart Sounds Pericardial Friction Rub] : no pericardial rub [Edema] : there was no peripheral edema [Abdomen Soft] : soft [Bowel Sounds] : normal bowel sounds [Abdomen Tenderness] : non-tender [Abdomen Mass (___ Cm)] : no abdominal mass palpated [Abdomen Hernia] : no hernia was discovered [Cervical Lymph Nodes Enlarged Posterior Bilaterally] : posterior cervical [Cervical Lymph Nodes Enlarged Anterior Bilaterally] : anterior cervical [Supraclavicular Lymph Nodes Enlarged Bilaterally] : supraclavicular [Axillary Lymph Nodes Enlarged Bilaterally] : axillary [No CVA Tenderness] : no ~M costovertebral angle tenderness [No Spinal Tenderness] : no spinal tenderness [Abnormal Walk] : normal gait [Nail Clubbing] : no clubbing  or cyanosis of the fingernails [Involuntary Movements] : no involuntary movements were seen [Motor Tone] : muscle strength and tone were normal [Skin Color & Pigmentation] : normal skin color and pigmentation [Skin Turgor] : normal skin turgor [Skin Lesions] : no skin lesions [No Focal Deficits] : no focal deficits [Oriented To Time, Place, And Person] : oriented to person, place, and time [Impaired Insight] : insight and judgment were intact [Affect] : the affect was normal [Mood] : the mood was normal [] : no hepato-splenomegaly [FreeTextEntry1] : Scarring on left anterior ankle of old psoriasis (? bx) , no current evidence of psoriasis.

## 2022-03-09 ENCOUNTER — APPOINTMENT (OUTPATIENT)
Dept: RHEUMATOLOGY | Facility: CLINIC | Age: 57
End: 2022-03-09

## 2022-03-24 ENCOUNTER — LABORATORY RESULT (OUTPATIENT)
Age: 57
End: 2022-03-24

## 2022-03-25 ENCOUNTER — APPOINTMENT (OUTPATIENT)
Dept: RADIOLOGY | Facility: CLINIC | Age: 57
End: 2022-03-25
Payer: MEDICAID

## 2022-03-25 ENCOUNTER — OUTPATIENT (OUTPATIENT)
Dept: OUTPATIENT SERVICES | Facility: HOSPITAL | Age: 57
LOS: 1 days | End: 2022-03-25
Payer: MEDICAID

## 2022-03-25 DIAGNOSIS — Z98.890 OTHER SPECIFIED POSTPROCEDURAL STATES: Chronic | ICD-10-CM

## 2022-03-25 DIAGNOSIS — M19.90 UNSPECIFIED OSTEOARTHRITIS, UNSPECIFIED SITE: ICD-10-CM

## 2022-03-25 PROCEDURE — 73120 X-RAY EXAM OF HAND: CPT | Mod: 26,LT,RT

## 2022-03-25 PROCEDURE — 73120 X-RAY EXAM OF HAND: CPT

## 2022-03-25 PROCEDURE — 72200 X-RAY EXAM SI JOINTS: CPT | Mod: 26

## 2022-03-25 PROCEDURE — 72200 X-RAY EXAM SI JOINTS: CPT

## 2022-03-27 LAB
ALBUMIN SERPL ELPH-MCNC: 4.2 G/DL
ALP BLD-CCNC: 81 U/L
ALT SERPL-CCNC: 15 U/L
ANA SER IF-ACNC: NEGATIVE
ANION GAP SERPL CALC-SCNC: 12 MMOL/L
APPEARANCE: CLEAR
AST SERPL-CCNC: 14 U/L
BASOPHILS # BLD AUTO: 0.02 K/UL
BASOPHILS NFR BLD AUTO: 0.3 %
BILIRUB SERPL-MCNC: 0.6 MG/DL
BILIRUBIN URINE: NEGATIVE
BLOOD URINE: NEGATIVE
BUN SERPL-MCNC: 18 MG/DL
C3 SERPL-MCNC: 122 MG/DL
C4 SERPL-MCNC: 28 MG/DL
CALCIUM SERPL-MCNC: 9.6 MG/DL
CALCIUM SERPL-MCNC: 9.6 MG/DL
CCP AB SER IA-ACNC: >250 UNITS
CHLORIDE SERPL-SCNC: 106 MMOL/L
CO2 SERPL-SCNC: 23 MMOL/L
COLOR: YELLOW
CREAT SERPL-MCNC: 0.74 MG/DL
CRP SERPL-MCNC: <3 MG/L
EGFR: 94 ML/MIN/1.73M2
ENA RNP AB SER IA-ACNC: 0.3 AL
ENA SM AB SER IA-ACNC: <0.2 AL
EOSINOPHIL # BLD AUTO: 0.07 K/UL
EOSINOPHIL NFR BLD AUTO: 0.9 %
ERYTHROCYTE [SEDIMENTATION RATE] IN BLOOD BY WESTERGREN METHOD: 33 MM/HR
FERRITIN SERPL-MCNC: 49 NG/ML
GLUCOSE QUALITATIVE U: NEGATIVE
GLUCOSE SERPL-MCNC: 85 MG/DL
HAV IGM SER QL: NONREACTIVE
HBV CORE IGM SER QL: NONREACTIVE
HBV SURFACE AG SER QL: NONREACTIVE
HCT VFR BLD CALC: 41.2 %
HCV AB SER QL: NONREACTIVE
HCV S/CO RATIO: 0.1 S/CO
HGB BLD-MCNC: 13 G/DL
IMM GRANULOCYTES NFR BLD AUTO: 0.3 %
IRON SATN MFR SERPL: 27 %
IRON SERPL-MCNC: 88 UG/DL
KETONES URINE: NEGATIVE
LEUKOCYTE ESTERASE URINE: ABNORMAL
LYMPHOCYTES # BLD AUTO: 2.88 K/UL
LYMPHOCYTES NFR BLD AUTO: 38.7 %
M TB IFN-G BLD-IMP: NEGATIVE
MAN DIFF?: NORMAL
MCHC RBC-ENTMCNC: 27.8 PG
MCHC RBC-ENTMCNC: 31.6 GM/DL
MCV RBC AUTO: 88 FL
MONOCYTES # BLD AUTO: 0.54 K/UL
MONOCYTES NFR BLD AUTO: 7.2 %
MYELOPEROXIDASE AB SER QL IA: <5 UNITS
MYELOPEROXIDASE CELLS FLD QL: NEGATIVE
NEUTROPHILS # BLD AUTO: 3.92 K/UL
NEUTROPHILS NFR BLD AUTO: 52.6 %
NITRITE URINE: NEGATIVE
PARATHYROID HORMONE INTACT: 43 PG/ML
PH URINE: 6
PLATELET # BLD AUTO: 245 K/UL
POTASSIUM SERPL-SCNC: 4.4 MMOL/L
PROT SERPL-MCNC: 6.6 G/DL
PROTEIN URINE: NORMAL
PROTEINASE3 AB SER IA-ACNC: <5 UNITS
PROTEINASE3 AB SER-ACNC: NEGATIVE
QUANTIFERON TB PLUS MITOGEN MINUS NIL: >9.5 IU/ML
QUANTIFERON TB PLUS NIL: 0.5 IU/ML
QUANTIFERON TB PLUS TB1 MINUS NIL: 0.09 IU/ML
QUANTIFERON TB PLUS TB2 MINUS NIL: 0.04 IU/ML
RBC # BLD: 4.68 M/UL
RBC # FLD: 14.1 %
RF+CCP IGG SER-IMP: ABNORMAL
SODIUM SERPL-SCNC: 141 MMOL/L
SPECIFIC GRAVITY URINE: 1.03
TIBC SERPL-MCNC: 322 UG/DL
UIBC SERPL-MCNC: 234 UG/DL
URATE SERPL-MCNC: 3.5 MG/DL
UROBILINOGEN URINE: NORMAL
WBC # FLD AUTO: 7.45 K/UL

## 2022-03-30 ENCOUNTER — APPOINTMENT (OUTPATIENT)
Dept: RHEUMATOLOGY | Facility: CLINIC | Age: 57
End: 2022-03-30
Payer: MEDICAID

## 2022-03-30 VITALS
BODY MASS INDEX: 27.59 KG/M2 | OXYGEN SATURATION: 98 % | HEART RATE: 76 BPM | WEIGHT: 127.5 LBS | SYSTOLIC BLOOD PRESSURE: 116 MMHG | TEMPERATURE: 97.6 F | DIASTOLIC BLOOD PRESSURE: 70 MMHG

## 2022-03-30 PROCEDURE — 99213 OFFICE O/P EST LOW 20 MIN: CPT

## 2022-03-30 NOTE — REVIEW OF SYSTEMS
[Loss Of Hearing] : hearing loss [Heartburn] : heartburn [Arthralgias] : arthralgias [Joint Pain] : joint pain [Joint Swelling] : joint swelling [Joint Stiffness] : joint stiffness [As Noted in HPI] : as noted in HPI [Negative] : Heme/Lymph [Fever] : no fever [Chills] : no chills [Feeling Poorly] : not feeling poorly [Eye Pain] : no eye pain [Red Eyes] : eyes not red [Eyesight Problems] : no eyesight problems [Dry Eyes] : no dryness of the eyes [Chest Pain] : no chest pain [Palpitations] : no palpitations [Shortness Of Breath] : no shortness of breath [Wheezing] : no wheezing [Cough] : no cough [SOB on Exertion] : no shortness of breath during exertion [Abdominal Pain] : no abdominal pain [Vomiting] : no vomiting [Constipation] : no constipation [Diarrhea] : no diarrhea [Confused] : no confusion [Limb Weakness] : no limb weakness [Difficulty Walking] : no difficulty walking [FreeTextEntry2] : frustration related to ongoing arthropathy - feeling great!  [FreeTextEntry4] : unknown origin of Left sided hearing loss w/ chronic tinnitus [FreeTextEntry5] : varicosities working w/ vascular sx something done L 2021- nothing on R  [FreeTextEntry7] : eventually had gallbladder resection 12/21 w/ + response, symptoms resolved [FreeTextEntry9] : all resolved now [de-identified] : no recent psoriasis episodes (only 1  episode previously)  [de-identified] : denies any muscular weakness

## 2022-03-30 NOTE — PHYSICAL EXAM
[General Appearance - Alert] : alert [General Appearance - In No Acute Distress] : in no acute distress [General Appearance - Well Nourished] : well nourished [General Appearance - Well Developed] : well developed [General Appearance - Well-Appearing] : healthy appearing [Sclera] : the sclera and conjunctiva were normal [Extraocular Movements] : extraocular movements were intact [Neck Appearance] : the appearance of the neck was normal [Respiration, Rhythm And Depth] : normal respiratory rhythm and effort [Exaggerated Use Of Accessory Muscles For Inspiration] : no accessory muscle use [Auscultation Breath Sounds / Voice Sounds] : lungs were clear to auscultation bilaterally [Heart Rate And Rhythm] : heart rate was normal and rhythm regular [Heart Sounds] : normal S1 and S2 [Heart Sounds Gallop] : no gallops [Murmurs] : no murmurs [Heart Sounds Pericardial Friction Rub] : no pericardial rub [Cervical Lymph Nodes Enlarged Posterior Bilaterally] : posterior cervical [Cervical Lymph Nodes Enlarged Anterior Bilaterally] : anterior cervical [Supraclavicular Lymph Nodes Enlarged Bilaterally] : supraclavicular [No CVA Tenderness] : no ~M costovertebral angle tenderness [No Spinal Tenderness] : no spinal tenderness [Abnormal Walk] : normal gait [Nail Clubbing] : no clubbing  or cyanosis of the fingernails [Involuntary Movements] : no involuntary movements were seen [Motor Tone] : muscle strength and tone were normal [Skin Color & Pigmentation] : normal skin color and pigmentation [Skin Turgor] : normal skin turgor [] : no rash [Skin Lesions] : no skin lesions [No Focal Deficits] : no focal deficits [Oriented To Time, Place, And Person] : oriented to person, place, and time [Impaired Insight] : insight and judgment were intact [Affect] : the affect was normal [Mood] : the mood was normal [Edema] : there was no peripheral edema [FreeTextEntry1] : Scarring on left anterior ankle of old psoriasis (? bx) , no current evidence of psoriasis.

## 2022-03-30 NOTE — HISTORY OF PRESENT ILLNESS
[FreeTextEntry1] : 3/30/22\par - excellent and full resolution of all synovitis / pain with steroids.. tolerated well with no SE.  \par - labs + CCP > 250  with  ESR 33 and nl C3/4 and CRP...otherwise nl/ neg studies to include Ferritin, COLLINS w/ subserologies, ANCA (+ indeterminant), MPO/ PR3, Uric acid.. \par - baseline xrays hands, SI joint nl. \par \par 1) Inflammatory polyarthropathy:  CCP + RA (newly dx 3/22) \par ______________________________________________________________________________ \par \par (Initial HPI  3/2/22) \par 56 yo female coming in for consult for long-standing hx of polyarthropathy. \par \par - 2 years of joint pain and swelling, worsens with raining involving R wrist, R elbow, MCPs bilateral R > L, bilateral shoulders, bilateral knees, R ankle. \par - Morning stiffness for 10 minutes approx. \par - + Back pain worse in the AM, lower. Feels better in 5 minutes. \par - Takes OTC Tylenol/Motrin/Aleve with minimal improvement. \par - Ankle swelling has been assessed with XR, MRI - reportedly nothing remarkable. \par - Plantar fasciitis bilateral dx, has tried steroid injection in bilateral feet with some improvement, but couldn't pursue this option long-term. \par - Dx with arthritis, [place] - labs done recently - was told arthritis might be of rheum. origin\par - Dx with psoriasis a few years ago, hydrocortisone cream improved rash, was on left anterior ankle/distal leg   \par \par - Hx of hearing loss (not complete) in left ear - unknown origin. (took high dose of steroids without improvement in the past)\par  - Denies trigger finger, swelling in fingers, toe swelling, vision changes, diarrhea, abd pain, red eyes, night sweats, lymphadenopathy, chest pain, SOB, pleurisy. \par \par Social: \par - P 3 G 3 (oldest 35 y.o), , lives alone. \par - Non-smoker, social drinker \par \par FH + history of arthritis (aunt), no family hx of psoriasis

## 2022-03-30 NOTE — CONSULT LETTER
[Dear  ___] : Dear  [unfilled], [Consult Letter:] : I had the pleasure of evaluating your patient, [unfilled]. [Consult Closing:] : Thank you very much for allowing me to participate in the care of this patient.  If you have any questions, please do not hesitate to contact me. [Sincerely,] : Sincerely, [DrNilay  ___] : Dr. COLÓN [DrNilay ___] : Dr. COLÓN [FreeTextEntry2] : Hiren Eagle MD  [FreeTextEntry1] : Please see below for summary of  recent rheumatology evaluation and recommendations.\par \par  [FreeTextEntry3] : Sheela Diaz DNP, ANP-C\par Division or Rheumatology\par [facility]\par \par

## 2022-04-12 ENCOUNTER — APPOINTMENT (OUTPATIENT)
Dept: RHEUMATOLOGY | Facility: CLINIC | Age: 57
End: 2022-04-12

## 2022-04-13 LAB
DSDNA AB SER-ACNC: <12 IU/ML
HLA-B27 RELATED AG QL: NEGATIVE

## 2022-05-07 LAB
ALBUMIN SERPL ELPH-MCNC: 4.2 G/DL
ALP BLD-CCNC: 95 U/L
ALT SERPL-CCNC: 16 U/L
ANION GAP SERPL CALC-SCNC: 9 MMOL/L
APPEARANCE: CLEAR
AST SERPL-CCNC: 17 U/L
BASOPHILS # BLD AUTO: 0.01 K/UL
BASOPHILS NFR BLD AUTO: 0.2 %
BILIRUB SERPL-MCNC: 0.4 MG/DL
BILIRUBIN URINE: NEGATIVE
BLOOD URINE: NEGATIVE
BUN SERPL-MCNC: 20 MG/DL
CALCIUM SERPL-MCNC: 9.2 MG/DL
CHLORIDE SERPL-SCNC: 105 MMOL/L
CO2 SERPL-SCNC: 26 MMOL/L
COLOR: NORMAL
CREAT SERPL-MCNC: 0.77 MG/DL
EGFR: 90 ML/MIN/1.73M2
EOSINOPHIL # BLD AUTO: 0.08 K/UL
EOSINOPHIL NFR BLD AUTO: 1.3 %
ERYTHROCYTE [SEDIMENTATION RATE] IN BLOOD BY WESTERGREN METHOD: 31 MM/HR
GLUCOSE QUALITATIVE U: NEGATIVE
GLUCOSE SERPL-MCNC: 98 MG/DL
HCT VFR BLD CALC: 40.4 %
HGB BLD-MCNC: 12.7 G/DL
IMM GRANULOCYTES NFR BLD AUTO: 0.3 %
KETONES URINE: NEGATIVE
LEUKOCYTE ESTERASE URINE: NEGATIVE
LYMPHOCYTES # BLD AUTO: 1.79 K/UL
LYMPHOCYTES NFR BLD AUTO: 28 %
MAN DIFF?: NORMAL
MCHC RBC-ENTMCNC: 27.9 PG
MCHC RBC-ENTMCNC: 31.4 GM/DL
MCV RBC AUTO: 88.6 FL
MONOCYTES # BLD AUTO: 0.59 K/UL
MONOCYTES NFR BLD AUTO: 9.2 %
NEUTROPHILS # BLD AUTO: 3.9 K/UL
NEUTROPHILS NFR BLD AUTO: 61 %
NITRITE URINE: NEGATIVE
PH URINE: 6
PLATELET # BLD AUTO: 297 K/UL
POTASSIUM SERPL-SCNC: 4.2 MMOL/L
PROT SERPL-MCNC: 6.8 G/DL
PROTEIN URINE: NEGATIVE
RBC # BLD: 4.56 M/UL
RBC # FLD: 13.3 %
SODIUM SERPL-SCNC: 140 MMOL/L
SPECIFIC GRAVITY URINE: 1.01
UROBILINOGEN URINE: NORMAL
WBC # FLD AUTO: 6.39 K/UL

## 2022-05-24 ENCOUNTER — APPOINTMENT (OUTPATIENT)
Dept: RHEUMATOLOGY | Facility: CLINIC | Age: 57
End: 2022-05-24
Payer: MEDICAID

## 2022-05-24 VITALS
OXYGEN SATURATION: 98 % | WEIGHT: 129 LBS | DIASTOLIC BLOOD PRESSURE: 70 MMHG | BODY MASS INDEX: 27.83 KG/M2 | HEIGHT: 57 IN | TEMPERATURE: 97.7 F | HEART RATE: 71 BPM | SYSTOLIC BLOOD PRESSURE: 100 MMHG

## 2022-05-24 DIAGNOSIS — M19.90 UNSPECIFIED OSTEOARTHRITIS, UNSPECIFIED SITE: ICD-10-CM

## 2022-05-24 PROCEDURE — 96372 THER/PROPH/DIAG INJ SC/IM: CPT

## 2022-05-24 PROCEDURE — 99213 OFFICE O/P EST LOW 20 MIN: CPT | Mod: 25

## 2022-05-24 RX ORDER — TRIAMCINOLONE ACETONIDE 80 MG/ML
80 INJECTION, SUSPENSION INTRA-ARTICULAR; INTRAMUSCULAR
Qty: 1 | Refills: 0 | Status: COMPLETED | OUTPATIENT
Start: 2022-05-24

## 2022-05-24 RX ADMIN — TRIAMCINOLONE ACETONIDE 0 MG/ML: 80 INJECTION, SUSPENSION INTRA-ARTICULAR; INTRAMUSCULAR at 00:00

## 2022-05-24 NOTE — HISTORY OF PRESENT ILLNESS
[FreeTextEntry1] : 5/24/22\par - excellent and full resolution of all synovitis / pain with steroids.. tolerated well with no SE... now on 10 mg MTX wkly and was doing well till ran out of MTX.. didn't call for renewal and immediate return synovitis R ankle.. severe pain/ warmth / swelling.    Recent sinus infection, given antibiotics and medrol dose pack- with + response and noted complete resolution of R ankle pain/ swelling, now returned as dose lowered. \par - tolerating MTX w/out SE.. \par - labs + CCP > 250  with  ESR 33 and nl C3/4 and CRP...otherwise nl/ neg studies to include Ferritin, COLLINS w/ subserologies, ANCA (+ indeterminant), MPO/ PR3, Uric acid.. \par - baseline xrays hands, SI joint nl. \par \par 1) Inflammatory polyarthropathy:  CCP + RA (newly dx 3/22) \par ______________________________________________________________________________ \par \par (Initial HPI  3/2/22) \par 58 yo female coming in for consult for long-standing hx of polyarthropathy. \par \par - 2 years of joint pain and swelling, worsens with raining involving R wrist, R elbow, MCPs bilateral R > L, bilateral shoulders, bilateral knees, R ankle. \par - Morning stiffness for 10 minutes approx. \par - + Back pain worse in the AM, lower. Feels better in 5 minutes. \par - Takes OTC Tylenol/Motrin/Aleve with minimal improvement. \par - Ankle swelling has been assessed with XR, MRI - reportedly nothing remarkable. \par - Plantar fasciitis bilateral dx, has tried steroid injection in bilateral feet with some improvement, but couldn't pursue this option long-term. \par - Dx with arthritis, [place] - labs done recently - was told arthritis might be of rheum. origin\par - Dx with psoriasis a few years ago, hydrocortisone cream improved rash, was on left anterior ankle/distal leg   \par \par - Hx of hearing loss (not complete) in left ear - unknown origin. (took high dose of steroids without improvement in the past)\par  - Denies trigger finger, swelling in fingers, toe swelling, vision changes, diarrhea, abd pain, red eyes, night sweats, lymphadenopathy, chest pain, SOB, pleurisy. \par \par Social: \par - P 3 G 3 (oldest 35 y.o), , lives alone. \par - Non-smoker, social drinker \par \par FH + history of arthritis (aunt), no family hx of psoriasis

## 2022-05-24 NOTE — REVIEW OF SYSTEMS
[Loss Of Hearing] : hearing loss [Heartburn] : heartburn [Arthralgias] : arthralgias [Joint Pain] : joint pain [Joint Swelling] : joint swelling [Joint Stiffness] : joint stiffness [As Noted in HPI] : as noted in HPI [Negative] : Heme/Lymph [Fever] : no fever [Chills] : no chills [Feeling Poorly] : not feeling poorly [Eye Pain] : no eye pain [Red Eyes] : eyes not red [Eyesight Problems] : no eyesight problems [Dry Eyes] : no dryness of the eyes [Chest Pain] : no chest pain [Palpitations] : no palpitations [Shortness Of Breath] : no shortness of breath [Wheezing] : no wheezing [Cough] : no cough [SOB on Exertion] : no shortness of breath during exertion [Abdominal Pain] : no abdominal pain [Vomiting] : no vomiting [Constipation] : no constipation [Diarrhea] : no diarrhea [Confused] : no confusion [Limb Weakness] : no limb weakness [Difficulty Walking] : no difficulty walking [FreeTextEntry2] : feeling well till recently  [FreeTextEntry4] : unknown origin of Left sided hearing loss w/ chronic tinnitus [FreeTextEntry5] : varicosities working w/ vascular sx something done L 2021- nothing on R  [FreeTextEntry7] : eventually had gallbladder resection 12/21 w/ + response, symptoms resolved [FreeTextEntry9] : all resolved now [de-identified] : no recent psoriasis episodes (only 1  episode previously)  [de-identified] : denies any muscular weakness

## 2022-05-24 NOTE — PROCEDURE
[Today's Date:] : Date: [unfilled] [Risks] : risks [Benefits] : benefits [Alternatives] : alternatives [Consent Obtained] : written consent was obtained prior to the procedure and is detailed in the patient's record [Patient] : Prior to the start of the procedure a time out was taken and the identity of the patient was confirmed via name and date of birth with the patient. The correct site and the procedure to be performed were confirmed. The correct side was confirmed if applicable. The availability of the correct equipment was verified [Therapeutic] : therapeutic [#1 Site: ______] : #1 site identified in the [unfilled] [Alcohol] : alcohol [25 gauge 5/8  inch] : A 25 gauge 5/8 inch needle was used [___ml Steriod Preparation] : [unfilled] ml of steriod preparation  [Tolerated Well] : the patient tolerated the procedure well [No Complications] : there were no complications [Instructions Given] : handouts/patient instructions were given to patient [Patient Instructed to Call] : patient was instructed to call if redness at site, a decrease in range of motion or an increase in pain is noted after procedure. [FreeTextEntry1] : .\par  This is a strong steroid.. can cause anxiety, hungry, irritable, trouble sleeping, rarely causes palpitations or chest pain but if present go to ER and then let me know \par \par

## 2022-05-24 NOTE — ASSESSMENT
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 all other studies neg\par \par 1.) RA:  though pattern is in SpA.. with recurrent plantar fascitis and asymmetrical presentation and possible psoriasis - patch on L ankle/ possible scalp lesion.. but CCP > 250 excellent and full response to pred at 20 mg daily.. feels great, not this good in many years.  \par baseline Hand/ SI joint xrays 3/22 negative. \par No deformities, full resolution all synovitis.  \par NO active psoriasis now... \par Lengthy discussion need for steroid sparing agent:  started on MTX 10 mg, never increased dose as directed.. and ran out .. without asking to refill- 2 wks ago with immediate return of syonvitis R ankle.. \par GIven 80 mg kenalog today and advised to increase to 15 mg MTX... don't run out. \par Labs stable on current dose, will repeat in 3 m.. prior to next visit.  \par NOTE:  \par Initial presentation + achilles tendonitis + plantar fasciitis, R wrist and MCP/ MTP inflammation \par - HLAB27 to rule out in future if needed not necessary now\par \par \par Plan:\par - Increase MTX to 6 tabs all together once wkly - DON'T RUN OUT!. This is long term treatment to keep the inflammation under control.. \par \par - Continue w/ Folic acid 1-3 mg daily on all the days you don't take MTX.  Start at 1 mg and increase in having any SE. \par \par - Call if you develop any infections (you may need to hold MTX based on need for antibiotics).  For any vaccines.. hold MTX for 1 wk after the vaccine.  \par \par - you were given 80 mg kenalog today for joint inflammation.. Call office Friday if not better.. \par \par - don't have to do labs every month, just 1 wk prior to office visit. Orders are on file \par \par - RPA 3 m - \par \par

## 2022-05-24 NOTE — PHYSICAL EXAM
[General Appearance - Alert] : alert [General Appearance - In No Acute Distress] : in no acute distress [General Appearance - Well Nourished] : well nourished [General Appearance - Well Developed] : well developed [General Appearance - Well-Appearing] : healthy appearing [Sclera] : the sclera and conjunctiva were normal [Extraocular Movements] : extraocular movements were intact [Neck Appearance] : the appearance of the neck was normal [Respiration, Rhythm And Depth] : normal respiratory rhythm and effort [Exaggerated Use Of Accessory Muscles For Inspiration] : no accessory muscle use [Auscultation Breath Sounds / Voice Sounds] : lungs were clear to auscultation bilaterally [Heart Rate And Rhythm] : heart rate was normal and rhythm regular [Heart Sounds] : normal S1 and S2 [Heart Sounds Gallop] : no gallops [Murmurs] : no murmurs [Heart Sounds Pericardial Friction Rub] : no pericardial rub [Edema] : there was no peripheral edema [Cervical Lymph Nodes Enlarged Posterior Bilaterally] : posterior cervical [Cervical Lymph Nodes Enlarged Anterior Bilaterally] : anterior cervical [Supraclavicular Lymph Nodes Enlarged Bilaterally] : supraclavicular [No CVA Tenderness] : no ~M costovertebral angle tenderness [No Spinal Tenderness] : no spinal tenderness [Abnormal Walk] : normal gait [Nail Clubbing] : no clubbing  or cyanosis of the fingernails [Involuntary Movements] : no involuntary movements were seen [Motor Tone] : muscle strength and tone were normal [Skin Color & Pigmentation] : normal skin color and pigmentation [Skin Turgor] : normal skin turgor [] : no rash [Skin Lesions] : no skin lesions [No Focal Deficits] : no focal deficits [Oriented To Time, Place, And Person] : oriented to person, place, and time [Impaired Insight] : insight and judgment were intact [Affect] : the affect was normal [Mood] : the mood was normal [FreeTextEntry1] : Scarring on left anterior ankle of old psoriasis (? bx) , no current evidence of psoriasis + response to topical steroids.. one lesion on scalp- tiny maculopapular scaly lesion

## 2022-05-24 NOTE — CONSULT LETTER
[Dear  ___] : Dear  [unfilled], [Consult Letter:] : I had the pleasure of evaluating your patient, [unfilled]. [Consult Closing:] : Thank you very much for allowing me to participate in the care of this patient.  If you have any questions, please do not hesitate to contact me. [Sincerely,] : Sincerely, [DrNilay  ___] : Dr. COLÓN [DrNilay ___] : Dr. COLÓN [FreeTextEntry2] : Hiren Eagle MD  [FreeTextEntry1] : Please see below for summary of  recent rheumatology evaluation and recommendations.\par \par  [FreeTextEntry3] : Sheela Diaz DNP, ANP-C\par Division or Rheumatology\par [facility]\par \par

## 2022-06-07 ENCOUNTER — LABORATORY RESULT (OUTPATIENT)
Age: 57
End: 2022-06-07

## 2022-06-14 ENCOUNTER — NON-APPOINTMENT (OUTPATIENT)
Age: 57
End: 2022-06-14

## 2022-06-16 LAB
ALBUMIN SERPL ELPH-MCNC: 4.2 G/DL
ALP BLD-CCNC: 81 U/L
ALT SERPL-CCNC: 18 U/L
ANION GAP SERPL CALC-SCNC: 13 MMOL/L
APPEARANCE: CLEAR
AST SERPL-CCNC: 17 U/L
BASOPHILS # BLD AUTO: 0.02 K/UL
BASOPHILS NFR BLD AUTO: 0.3 %
BILIRUB SERPL-MCNC: 0.6 MG/DL
BILIRUBIN URINE: NEGATIVE
BLOOD URINE: NEGATIVE
BUN SERPL-MCNC: 24 MG/DL
CALCIUM SERPL-MCNC: 9.5 MG/DL
CHLORIDE SERPL-SCNC: 105 MMOL/L
CO2 SERPL-SCNC: 23 MMOL/L
COLOR: YELLOW
CREAT SERPL-MCNC: 0.77 MG/DL
EGFR: 90 ML/MIN/1.73M2
EOSINOPHIL # BLD AUTO: 0.17 K/UL
EOSINOPHIL NFR BLD AUTO: 2.4 %
ERYTHROCYTE [SEDIMENTATION RATE] IN BLOOD BY WESTERGREN METHOD: 39 MM/HR
GLUCOSE QUALITATIVE U: NEGATIVE
GLUCOSE SERPL-MCNC: 86 MG/DL
HCT VFR BLD CALC: 39.9 %
HGB BLD-MCNC: 12.2 G/DL
IMM GRANULOCYTES NFR BLD AUTO: 0.3 %
KETONES URINE: NEGATIVE
LEUKOCYTE ESTERASE URINE: NEGATIVE
LYMPHOCYTES # BLD AUTO: 2.41 K/UL
LYMPHOCYTES NFR BLD AUTO: 34 %
MAN DIFF?: NORMAL
MCHC RBC-ENTMCNC: 27.1 PG
MCHC RBC-ENTMCNC: 30.6 GM/DL
MCV RBC AUTO: 88.5 FL
MONOCYTES # BLD AUTO: 0.6 K/UL
MONOCYTES NFR BLD AUTO: 8.5 %
NEUTROPHILS # BLD AUTO: 3.86 K/UL
NEUTROPHILS NFR BLD AUTO: 54.5 %
NITRITE URINE: NEGATIVE
PH URINE: 6
PLATELET # BLD AUTO: 302 K/UL
POTASSIUM SERPL-SCNC: 4.2 MMOL/L
PROT SERPL-MCNC: 6.7 G/DL
PROTEIN URINE: NORMAL
RBC # BLD: 4.51 M/UL
RBC # FLD: 13.2 %
SODIUM SERPL-SCNC: 140 MMOL/L
SPECIFIC GRAVITY URINE: 1.03
UROBILINOGEN URINE: NORMAL
WBC # FLD AUTO: 7.08 K/UL

## 2022-06-27 ENCOUNTER — LABORATORY RESULT (OUTPATIENT)
Age: 57
End: 2022-06-27

## 2022-07-04 LAB
ALBUMIN SERPL ELPH-MCNC: 4 G/DL
ALP BLD-CCNC: 85 U/L
ALT SERPL-CCNC: 19 U/L
ANION GAP SERPL CALC-SCNC: 8 MMOL/L
APPEARANCE: CLEAR
AST SERPL-CCNC: 20 U/L
BASOPHILS # BLD AUTO: 0.01 K/UL
BASOPHILS NFR BLD AUTO: 0.2 %
BILIRUB SERPL-MCNC: 0.6 MG/DL
BILIRUBIN URINE: NEGATIVE
BLOOD URINE: NORMAL
BUN SERPL-MCNC: 15 MG/DL
CALCIUM SERPL-MCNC: 9.3 MG/DL
CHLORIDE SERPL-SCNC: 108 MMOL/L
CO2 SERPL-SCNC: 25 MMOL/L
COLOR: NORMAL
CREAT SERPL-MCNC: 0.81 MG/DL
EGFR: 85 ML/MIN/1.73M2
EOSINOPHIL # BLD AUTO: 0.07 K/UL
EOSINOPHIL NFR BLD AUTO: 1.1 %
ERYTHROCYTE [SEDIMENTATION RATE] IN BLOOD BY WESTERGREN METHOD: 64 MM/HR
GLUCOSE QUALITATIVE U: NEGATIVE
GLUCOSE SERPL-MCNC: 90 MG/DL
HCT VFR BLD CALC: 39.3 %
HGB BLD-MCNC: 12.5 G/DL
IMM GRANULOCYTES NFR BLD AUTO: 0.8 %
KETONES URINE: NEGATIVE
LEUKOCYTE ESTERASE URINE: ABNORMAL
LYMPHOCYTES # BLD AUTO: 1.71 K/UL
LYMPHOCYTES NFR BLD AUTO: 26.4 %
MAN DIFF?: NORMAL
MCHC RBC-ENTMCNC: 27.6 PG
MCHC RBC-ENTMCNC: 31.8 GM/DL
MCV RBC AUTO: 86.8 FL
MONOCYTES # BLD AUTO: 0.39 K/UL
MONOCYTES NFR BLD AUTO: 6 %
NEUTROPHILS # BLD AUTO: 4.24 K/UL
NEUTROPHILS NFR BLD AUTO: 65.5 %
NITRITE URINE: NEGATIVE
PH URINE: 5.5
PLATELET # BLD AUTO: 282 K/UL
POTASSIUM SERPL-SCNC: 4.2 MMOL/L
PROT SERPL-MCNC: 6.9 G/DL
PROTEIN URINE: NEGATIVE
RBC # BLD: 4.53 M/UL
RBC # FLD: 13.2 %
SODIUM SERPL-SCNC: 140 MMOL/L
SPECIFIC GRAVITY URINE: 1.02
UROBILINOGEN URINE: NORMAL
WBC # FLD AUTO: 6.47 K/UL

## 2022-07-22 ENCOUNTER — LABORATORY RESULT (OUTPATIENT)
Age: 57
End: 2022-07-22

## 2022-07-28 LAB
ALBUMIN SERPL ELPH-MCNC: 4.1 G/DL
ALP BLD-CCNC: 93 U/L
ALT SERPL-CCNC: 18 U/L
ANION GAP SERPL CALC-SCNC: 10 MMOL/L
APPEARANCE: CLEAR
AST SERPL-CCNC: 17 U/L
BASOPHILS # BLD AUTO: 0.02 K/UL
BASOPHILS NFR BLD AUTO: 0.3 %
BILIRUB SERPL-MCNC: 0.5 MG/DL
BILIRUBIN URINE: NEGATIVE
BLOOD URINE: NORMAL
BUN SERPL-MCNC: 17 MG/DL
CALCIUM SERPL-MCNC: 9.6 MG/DL
CHLORIDE SERPL-SCNC: 108 MMOL/L
CO2 SERPL-SCNC: 23 MMOL/L
COLOR: NORMAL
CREAT SERPL-MCNC: 0.9 MG/DL
EGFR: 75 ML/MIN/1.73M2
EOSINOPHIL # BLD AUTO: 0.09 K/UL
EOSINOPHIL NFR BLD AUTO: 1.3 %
ERYTHROCYTE [SEDIMENTATION RATE] IN BLOOD BY WESTERGREN METHOD: 39 MM/HR
GLUCOSE QUALITATIVE U: NEGATIVE
GLUCOSE SERPL-MCNC: 96 MG/DL
HCT VFR BLD CALC: 39.1 %
HGB BLD-MCNC: 12.3 G/DL
IMM GRANULOCYTES NFR BLD AUTO: 0.4 %
KETONES URINE: NEGATIVE
LEUKOCYTE ESTERASE URINE: ABNORMAL
LYMPHOCYTES # BLD AUTO: 1.75 K/UL
LYMPHOCYTES NFR BLD AUTO: 24.3 %
MAN DIFF?: NORMAL
MCHC RBC-ENTMCNC: 27.2 PG
MCHC RBC-ENTMCNC: 31.5 GM/DL
MCV RBC AUTO: 86.3 FL
MONOCYTES # BLD AUTO: 0.63 K/UL
MONOCYTES NFR BLD AUTO: 8.8 %
NEUTROPHILS # BLD AUTO: 4.67 K/UL
NEUTROPHILS NFR BLD AUTO: 64.9 %
NITRITE URINE: NEGATIVE
PH URINE: 6
PLATELET # BLD AUTO: 311 K/UL
POTASSIUM SERPL-SCNC: 4.2 MMOL/L
PROT SERPL-MCNC: 6.8 G/DL
PROTEIN URINE: NORMAL
RBC # BLD: 4.53 M/UL
RBC # FLD: 13.7 %
SODIUM SERPL-SCNC: 140 MMOL/L
SPECIFIC GRAVITY URINE: 1.02
UROBILINOGEN URINE: NORMAL
WBC # FLD AUTO: 7.19 K/UL

## 2022-09-25 LAB
ALBUMIN SERPL ELPH-MCNC: 4.2 G/DL
ALP BLD-CCNC: 91 U/L
ALT SERPL-CCNC: 17 U/L
ANION GAP SERPL CALC-SCNC: 9 MMOL/L
APPEARANCE: CLEAR
AST SERPL-CCNC: 15 U/L
BASOPHILS # BLD AUTO: 0.01 K/UL
BASOPHILS NFR BLD AUTO: 0.1 %
BILIRUB SERPL-MCNC: 0.7 MG/DL
BILIRUBIN URINE: NEGATIVE
BLOOD URINE: NEGATIVE
BUN SERPL-MCNC: 11 MG/DL
CALCIUM SERPL-MCNC: 9.6 MG/DL
CHLORIDE SERPL-SCNC: 108 MMOL/L
CO2 SERPL-SCNC: 24 MMOL/L
COLOR: NORMAL
CREAT SERPL-MCNC: 0.65 MG/DL
EGFR: 103 ML/MIN/1.73M2
EOSINOPHIL # BLD AUTO: 0.01 K/UL
EOSINOPHIL NFR BLD AUTO: 0.1 %
ERYTHROCYTE [SEDIMENTATION RATE] IN BLOOD BY WESTERGREN METHOD: 54 MM/HR
GLUCOSE QUALITATIVE U: NEGATIVE
GLUCOSE SERPL-MCNC: 106 MG/DL
HCT VFR BLD CALC: 40.9 %
HGB BLD-MCNC: 12.8 G/DL
IMM GRANULOCYTES NFR BLD AUTO: 0.3 %
KETONES URINE: NEGATIVE
LEUKOCYTE ESTERASE URINE: NEGATIVE
LYMPHOCYTES # BLD AUTO: 1.51 K/UL
LYMPHOCYTES NFR BLD AUTO: 20.9 %
MAN DIFF?: NORMAL
MCHC RBC-ENTMCNC: 27 PG
MCHC RBC-ENTMCNC: 31.3 GM/DL
MCV RBC AUTO: 86.3 FL
MONOCYTES # BLD AUTO: 0.39 K/UL
MONOCYTES NFR BLD AUTO: 5.4 %
NEUTROPHILS # BLD AUTO: 5.29 K/UL
NEUTROPHILS NFR BLD AUTO: 73.2 %
NITRITE URINE: NEGATIVE
PH URINE: 6
PLATELET # BLD AUTO: 338 K/UL
POTASSIUM SERPL-SCNC: 4.2 MMOL/L
PROT SERPL-MCNC: 7 G/DL
PROTEIN URINE: NEGATIVE
RBC # BLD: 4.74 M/UL
RBC # FLD: 13.4 %
SODIUM SERPL-SCNC: 141 MMOL/L
SPECIFIC GRAVITY URINE: 1.02
UROBILINOGEN URINE: NORMAL
WBC # FLD AUTO: 7.23 K/UL

## 2022-10-19 ENCOUNTER — NON-APPOINTMENT (OUTPATIENT)
Age: 57
End: 2022-10-19

## 2022-11-04 ENCOUNTER — APPOINTMENT (OUTPATIENT)
Dept: CARDIOLOGY | Facility: CLINIC | Age: 57
End: 2022-11-04

## 2022-11-04 ENCOUNTER — NON-APPOINTMENT (OUTPATIENT)
Age: 57
End: 2022-11-04

## 2022-11-04 VITALS
HEART RATE: 73 BPM | BODY MASS INDEX: 28.26 KG/M2 | OXYGEN SATURATION: 98 % | DIASTOLIC BLOOD PRESSURE: 74 MMHG | WEIGHT: 131 LBS | SYSTOLIC BLOOD PRESSURE: 116 MMHG | HEIGHT: 57 IN

## 2022-11-04 DIAGNOSIS — R94.31 ABNORMAL ELECTROCARDIOGRAM [ECG] [EKG]: ICD-10-CM

## 2022-11-04 PROCEDURE — 99205 OFFICE O/P NEW HI 60 MIN: CPT | Mod: 25

## 2022-11-04 PROCEDURE — 93000 ELECTROCARDIOGRAM COMPLETE: CPT

## 2022-11-04 NOTE — ASSESSMENT
[FreeTextEntry1] : A/P:\par \par *Abnormal ECG\par -Likely normal variant.\par \par -Echo\par -EST\par \par Return after testing to review results.

## 2022-11-04 NOTE — HISTORY OF PRESENT ILLNESS
[FreeTextEntry1] : 58 y/o referred for abnormal ECG by urgent care.\par \par Presented there Oct -20 w/ L shoulder pain,\par 3 weeks ago, given medication & resolved.\par Musculoskeletal pain   No relation to activity.\par Constant pain x 2 days.\par \par medical hx: Psoriasis, rheumatoid artritis/ no CAD\par No prior cardiac history.\par No prior cardiac surgeries testing procedures.\par \par No family hx cardiac disease.\par soc hx neg x 3\par \par ECG today reviewed - borderline incomplete RBBB pattern.\par urgent care ECG could not be reviewed.

## 2022-11-21 ENCOUNTER — APPOINTMENT (OUTPATIENT)
Dept: CARDIOLOGY | Facility: CLINIC | Age: 57
End: 2022-11-21

## 2022-11-21 PROCEDURE — 93015 CV STRESS TEST SUPVJ I&R: CPT

## 2022-11-21 PROCEDURE — 93306 TTE W/DOPPLER COMPLETE: CPT | Mod: GC

## 2022-11-30 ENCOUNTER — APPOINTMENT (OUTPATIENT)
Dept: CARDIOLOGY | Facility: CLINIC | Age: 57
End: 2022-11-30

## 2022-12-01 ENCOUNTER — APPOINTMENT (OUTPATIENT)
Dept: RHEUMATOLOGY | Facility: CLINIC | Age: 57
End: 2022-12-01

## 2022-12-01 VITALS
OXYGEN SATURATION: 98 % | HEIGHT: 57 IN | HEART RATE: 80 BPM | DIASTOLIC BLOOD PRESSURE: 60 MMHG | TEMPERATURE: 98.1 F | WEIGHT: 131 LBS | SYSTOLIC BLOOD PRESSURE: 120 MMHG | BODY MASS INDEX: 28.26 KG/M2

## 2022-12-01 PROCEDURE — 20610 DRAIN/INJ JOINT/BURSA W/O US: CPT | Mod: LT

## 2022-12-01 PROCEDURE — 99214 OFFICE O/P EST MOD 30 MIN: CPT | Mod: 25

## 2022-12-01 RX ORDER — METHYLPRED ACET/NACL,ISO-OS/PF 80 MG/ML
80 VIAL (ML) INJECTION
Qty: 1 | Refills: 0 | Status: COMPLETED | OUTPATIENT
Start: 2022-12-01

## 2022-12-01 RX ADMIN — METHYLPREDNISOLONE ACETATE 0 MG/ML: 80 INJECTION, SUSPENSION INTRA-ARTICULAR; INTRALESIONAL; INTRAMUSCULAR; SOFT TISSUE at 00:00

## 2022-12-09 NOTE — PROCEDURE
[Today's Date:] : Date: [unfilled] [Risks] : risks [Benefits] : benefits [Alternatives] : alternatives [Consent Obtained] : written consent was obtained prior to the procedure and is detailed in the patient's record [Patient] : Prior to the start of the procedure a time out was taken and the identity of the patient was confirmed via name and date of birth with the patient. The correct site and the procedure to be performed were confirmed. The correct side was confirmed if applicable. The availability of the correct equipment was verified [Therapeutic] : therapeutic [#1 Site: ______] : #1 site identified in the [unfilled] [Ethyl Chloride] : ethyl chloride [___ ml Inj] : [unfilled] ~Uml [1%] : 1%  [Betadine] : betadine solution [Alcohol] : alcohol [25 gauge 1.5 inch] : A 25 gauge 1.5 inch needle was used [Depomedrol ___ mg] : Depomedrol [unfilled] mg [Tolerated Well] : the patient tolerated the procedure well [No Complications] : there were no complications [Instructions Given] : handouts/patient instructions were given to patient [Patient Instructed to Call] : patient was instructed to call if redness at site, a decrease in range of motion or an increase in pain is noted after procedure. [FreeTextEntry1] : Ice routinely 20/20 for the next 24 hrs and oscar if pain not improved w/in 72 hr or if worsened joint pain, or signs of infection including fever, chills, redness at site ensues. This is a strong steroid. It can cause anxiety, hunger, irritability, trouble sleeping, rarely causes palpitations or chest pain but if present go to to ER and then let me know.\par \par

## 2022-12-09 NOTE — HISTORY OF PRESENT ILLNESS
[FreeTextEntry1] : 12/1/2022\par \par - Discontinued MTX she states because she ran out despite still having 3 refills in the system....scared to take MTX because of possible side effects (losing hair and mouth sores).....was taking prednisone 10 mg instead (was told last visit she should not take prednisone only MTX).....she reduced her dosage of prednisone when she was told by her GYN that she should discontinue it......now takes it a couple of times a week (last dose was yesterday)\par - She has been having diffuse pain all over, most severely in her R ankle and her bl knees...... also having issues w R elbow and her bl fingers\par - Still hearing a ring in her L ear, but it is about the same as before \par - Does not like to take any medication....has discontinued everything besides vitamine C and Tylenol when needed \par - No fh of RA except for Aunt.. \par - baseline xrays hands, SI joint nl. \par \par 1) Inflammatory polyarthropathy:  CCP + RA (newly dx 3/22) \par ______________________________________________________________________________ \par \par (Initial HPI  3/2/22) \par 58 yo female coming in for consult for long-standing hx of polyarthropathy. \par \par - 2 years of joint pain and swelling, worsens with raining involving R wrist, R elbow, MCPs bilateral R > L, bilateral shoulders, bilateral knees, R ankle. \par - Morning stiffness for 10 minutes approx. \par - + Back pain worse in the AM, lower. Feels better in 5 minutes. \par - Takes OTC Tylenol/Motrin/Aleve with minimal improvement. \par - Ankle swelling has been assessed with XR, MRI - reportedly nothing remarkable. \par - Plantar fasciitis bilateral dx, has tried steroid injection in bilateral feet with some improvement, but couldn't pursue this option long-term. \par - Dx with arthritis, [place] - labs done recently - was told arthritis might be of rheum. origin\par - Dx with psoriasis a few years ago, hydrocortisone cream improved rash, was on left anterior ankle/distal leg   \par \par - Hx of hearing loss (not complete) in left ear - unknown origin. (took high dose of steroids without improvement in the past)\par  - Denies trigger finger, swelling in fingers, toe swelling, vision changes, diarrhea, abd pain, red eyes, night sweats, lymphadenopathy, chest pain, SOB, pleurisy. \par \par Social: \par - P 3 G 3 (oldest 35 y.o), , lives alone. \par - Non-smoker, social drinker \par \par FH + history of arthritis (aunt), no family hx of psoriasis

## 2022-12-09 NOTE — ASSESSMENT
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 with recurrent plantar fascitis and asymmetrical presentation and possible psoriasis - patch on L ankle/ possible scalp lesion.. but CCP > 250 excellent and full response to pred at 20 mg daily.. feels great, not this good in many years.  \par Active synovitis today marked R ankle, L knee and R elbow... needs more aggressive/ consistent tx, concerned about compliance... suggest infusion therapy, willing to consider.  Will start with Simponi aria (too concerned about immunogenicity with infliximab and compliance with MTX)... \par NO active psoriasis now... \par Lengthy discussion need for steroid sparing agent again.. did not take MTX 2/2 fear of mouth sores... instead taking prednisone 5-10 mg daily (most days since last visit).. \par GIven 80 mg kenalog today to L knee  and advised to increase to 15 mg MTX... don't run out. \par Labs stable on current dose, will repeat in 3 m.. prior to next visit. \par NOTE: \par Baseline Hand/ SI joint xrays 3/22 negative for erosions, no deformities / and neg HLAB27\par Initial presentation + achilles tendonitis + plantar fasciitis, R wrist and MCP/ MTP inflammation\par \par 2) Compliance:  unclear language barrier, reports understanding and speaks good English but following directions not always consistent.  Concerned about degree of active joint inflammation and repeated use of steroid for control.   Ideally with infusion tx should be better, RN team for support\par \par \par Plan:\par - Completing labs today\par \par - Administered 80 mg of Depo-Medrol in L knee.....ice knees tonight and put an ACE wrap on R ankle and elbow \par \par - Switching to infusions (Simponi Aria - if approved/ if not would consider ORencia  as monotx) ....STOP TAKING PREDNISONE (unless you are really desperate take 1-2 if you need it).......you do not need to resume MTX since you are scared of side effects, but you will have to wait a couple weeks to get approved for the infusion....you should hear from the office about infusions in 2 weeks if you do not call \par \par - Follow up w/Sheela Diaz in 2 months \par \par

## 2022-12-09 NOTE — CONSULT LETTER
[Dear  ___] : Dear  [unfilled], [Consult Letter:] : I had the pleasure of evaluating your patient, [unfilled]. [Consult Closing:] : Thank you very much for allowing me to participate in the care of this patient.  If you have any questions, please do not hesitate to contact me. [Sincerely,] : Sincerely, [DrNilay  ___] : Dr. COLÓN [DrNilay ___] : Dr. COLÓN [FreeTextEntry2] : Hiren Eagle MD  [FreeTextEntry1] : Please see below for summary of  recent rheumatology evaluation and recommendations.\par \par  [FreeTextEntry3] : Sheela Diaz DNP, ANP-C\par Division or Rheumatology\par [facility]\par \par

## 2022-12-09 NOTE — REVIEW OF SYSTEMS
[Loss Of Hearing] : hearing loss [Arthralgias] : arthralgias [Joint Pain] : joint pain [Joint Swelling] : joint swelling [Joint Stiffness] : joint stiffness [As Noted in HPI] : as noted in HPI [Anxiety] : anxiety [Negative] : Genitourinary [Fever] : no fever [Chills] : no chills [Feeling Poorly] : not feeling poorly [Eye Pain] : no eye pain [Red Eyes] : eyes not red [Eyesight Problems] : no eyesight problems [Dry Eyes] : no dryness of the eyes [Chest Pain] : no chest pain [Palpitations] : no palpitations [Shortness Of Breath] : no shortness of breath [Wheezing] : no wheezing [Cough] : no cough [SOB on Exertion] : no shortness of breath during exertion [Abdominal Pain] : no abdominal pain [Vomiting] : no vomiting [Constipation] : no constipation [Diarrhea] : no diarrhea [Confused] : no confusion [Limb Weakness] : no limb weakness [Difficulty Walking] : no difficulty walking [FreeTextEntry4] : unknown origin of Left sided hearing loss w/ chronic tinnitus- stable  [FreeTextEntry5] : varicosities working w/ vascular sx something done L 2021- nothing on R  [FreeTextEntry7] : eventually had gallbladder resection 12/21 w/ + response, symptoms resolved [FreeTextEntry9] : diffuse throughout ankles, knees, fingers, and R elbow [de-identified] : no recent psoriasis episodes (only 1  episode previously)  [de-identified] : denies any muscular weakness

## 2022-12-14 ENCOUNTER — NON-APPOINTMENT (OUTPATIENT)
Age: 57
End: 2022-12-14

## 2022-12-21 ENCOUNTER — APPOINTMENT (OUTPATIENT)
Dept: CARDIOLOGY | Facility: CLINIC | Age: 57
End: 2022-12-21

## 2022-12-21 VITALS
BODY MASS INDEX: 28.26 KG/M2 | DIASTOLIC BLOOD PRESSURE: 82 MMHG | WEIGHT: 131 LBS | HEART RATE: 73 BPM | RESPIRATION RATE: 14 BRPM | HEIGHT: 57 IN | SYSTOLIC BLOOD PRESSURE: 120 MMHG | OXYGEN SATURATION: 97 %

## 2022-12-21 PROCEDURE — 93000 ELECTROCARDIOGRAM COMPLETE: CPT

## 2022-12-21 PROCEDURE — 99214 OFFICE O/P EST MOD 30 MIN: CPT | Mod: 25

## 2022-12-21 NOTE — ASSESSMENT
[FreeTextEntry1] : A/P:\par \par -Normal stress test & echo\par -No recurrence of chest discomfort.\par \par No further evaluation.  \par Discussed reasons for reevaluation i.e. exertional chest pain.

## 2022-12-21 NOTE — HISTORY OF PRESENT ILLNESS
[FreeTextEntry1] : here for followup.\par \par Reviewed results of teseting.\par exercise tress test w/ no ischemia.\par Echo w/ normal EF no RWMA\par \par No recurrence of chest pain.

## 2022-12-27 ENCOUNTER — NON-APPOINTMENT (OUTPATIENT)
Age: 57
End: 2022-12-27

## 2022-12-28 RX ORDER — METHYLPREDNISOLONE 40 MG/ML
40 INJECTION, POWDER, LYOPHILIZED, FOR SOLUTION INTRAMUSCULAR; INTRAVENOUS
Qty: 1 | Refills: 0 | Status: COMPLETED | OUTPATIENT
Start: 2022-12-28 | End: 1900-01-01

## 2022-12-28 RX ORDER — ACETAMINOPHEN 325 MG/1
325 TABLET ORAL
Qty: 0 | Refills: 0 | Status: COMPLETED | OUTPATIENT
Start: 2022-12-28 | End: 1900-01-01

## 2022-12-28 RX ORDER — CETIRIZINE HYDROCHLORIDE 10 MG/1
10 TABLET, COATED ORAL
Qty: 0 | Refills: 0 | Status: COMPLETED | OUTPATIENT
Start: 2022-12-28 | End: 1900-01-01

## 2022-12-28 RX ORDER — GOLIMUMAB 50 MG/4ML
50 SOLUTION INTRAVENOUS
Qty: 0 | Refills: 0 | Status: COMPLETED | OUTPATIENT
Start: 2022-12-28 | End: 1900-01-01

## 2022-12-30 ENCOUNTER — NON-APPOINTMENT (OUTPATIENT)
Age: 57
End: 2022-12-30

## 2023-01-02 LAB
AA PROT SER-MCNC: 40 UG/ML
ALBUMIN SERPL ELPH-MCNC: 3.9 G/DL
ALP BLD-CCNC: 86 U/L
ALT SERPL-CCNC: 18 U/L
ANION GAP SERPL CALC-SCNC: 6 MMOL/L
AST SERPL-CCNC: 15 U/L
BASOPHILS # BLD AUTO: 0.01 K/UL
BASOPHILS NFR BLD AUTO: 0.1 %
BILIRUB SERPL-MCNC: 0.6 MG/DL
BIOMARKER COMMENT: NORMAL
BUN SERPL-MCNC: 14 MG/DL
CALCIUM SERPL-MCNC: 9.1 MG/DL
CHLORIDE SERPL-SCNC: 109 MMOL/L
CO2 SERPL-SCNC: 28 MMOL/L
CREAT SERPL-MCNC: 0.8 MG/DL
CRP SERPL-MCNC: 24 MG/L
EGF SERPL-MCNC: 73 PG/ML
EGFR: 86 ML/MIN/1.73M2
EOSINOPHIL # BLD AUTO: 0.09 K/UL
EOSINOPHIL NFR BLD AUTO: 1.3 %
ERYTHROCYTE [SEDIMENTATION RATE] IN BLOOD BY WESTERGREN METHOD: 60 MM/HR
FOOTNOTE: NORMAL
GLUCOSE SERPL-MCNC: 91 MG/DL
HAV IGM SER QL: NONREACTIVE
HBV CORE IGM SER QL: NONREACTIVE
HBV SURFACE AG SER QL: NONREACTIVE
HCT VFR BLD CALC: 37.4 %
HCV AB SER QL: NONREACTIVE
HCV S/CO RATIO: 0.09 S/CO
HGB BLD-MCNC: 11.5 G/DL
IL6 SERPL-MCNC: 42 PG/ML
IMM GRANULOCYTES NFR BLD AUTO: 0.1 %
LEPTIN SERPL-MCNC: 120 NG/ML
LYMPHOCYTES # BLD AUTO: 1.81 K/UL
LYMPHOCYTES NFR BLD AUTO: 26.1 %
Lab: NORMAL
Lab: NORMAL
M TB IFN-G BLD-IMP: NEGATIVE
MAN DIFF?: NORMAL
MCHC RBC-ENTMCNC: 26.8 PG
MCHC RBC-ENTMCNC: 30.7 GM/DL
MCV RBC AUTO: 87.2 FL
MMP-1 SERPL-MCNC: 3.1 NG/ML
MMP-3 SERPL-MCNC: 58 NG/ML
MONOCYTES # BLD AUTO: 0.48 K/UL
MONOCYTES NFR BLD AUTO: 6.9 %
NEUTROPHILS # BLD AUTO: 4.54 K/UL
NEUTROPHILS NFR BLD AUTO: 65.5 %
PLATELET # BLD AUTO: 290 K/UL
POTASSIUM SERPL-SCNC: 4 MMOL/L
PROT SERPL-MCNC: 6.2 G/DL
QUANTIFERON TB PLUS MITOGEN MINUS NIL: 3.69 IU/ML
QUANTIFERON TB PLUS NIL: 0.22 IU/ML
QUANTIFERON TB PLUS TB1 MINUS NIL: 0.01 IU/ML
QUANTIFERON TB PLUS TB2 MINUS NIL: -0.07 IU/ML
RA DAS LEVEL QL VECTRADA: NORMAL
RBC # BLD: 4.29 M/UL
RBC # FLD: 13.4 %
RESISTIN SERPL-MCNC: 4.5 NG/ML
RISK OF RADIOGRAPHIC PROGRESS: 9 %
SODIUM SERPL-SCNC: 143 MMOL/L
TNFRSF1A SERPL-MCNC: 1.3 NG/ML
VAP-1 SERPL-MCNC: 0.59 UG/ML
VECTRA SCORE: 52
VEGF-A SERPL-MCNC: 200 PG/ML
WBC # FLD AUTO: 6.94 K/UL
YKL-40 RESULT: 70 NG/ML

## 2023-01-04 ENCOUNTER — APPOINTMENT (OUTPATIENT)
Dept: RHEUMATOLOGY | Facility: CLINIC | Age: 58
End: 2023-01-04
Payer: MEDICAID

## 2023-01-04 ENCOUNTER — MED ADMIN CHARGE (OUTPATIENT)
Age: 58
End: 2023-01-04

## 2023-01-04 VITALS
DIASTOLIC BLOOD PRESSURE: 64 MMHG | OXYGEN SATURATION: 96 % | SYSTOLIC BLOOD PRESSURE: 103 MMHG | RESPIRATION RATE: 18 BRPM | HEART RATE: 79 BPM

## 2023-01-04 VITALS
RESPIRATION RATE: 18 BRPM | TEMPERATURE: 96.1 F | DIASTOLIC BLOOD PRESSURE: 67 MMHG | OXYGEN SATURATION: 97 % | SYSTOLIC BLOOD PRESSURE: 100 MMHG | HEART RATE: 82 BPM

## 2023-01-04 PROCEDURE — 96374 THER/PROPH/DIAG INJ IV PUSH: CPT | Mod: 59

## 2023-01-04 PROCEDURE — 96413 CHEMO IV INFUSION 1 HR: CPT

## 2023-01-04 RX ORDER — METHYLPREDNISOLONE 40 MG/ML
40 INJECTION, POWDER, LYOPHILIZED, FOR SOLUTION INTRAMUSCULAR; INTRAVENOUS
Qty: 1 | Refills: 0 | Status: COMPLETED | OUTPATIENT
Start: 2022-12-28

## 2023-01-04 RX ORDER — ACETAMINOPHEN 325 MG/1
325 TABLET ORAL
Qty: 0 | Refills: 0 | Status: COMPLETED | OUTPATIENT
Start: 2022-12-28

## 2023-01-04 RX ORDER — GOLIMUMAB 50 MG/4ML
50 SOLUTION INTRAVENOUS
Qty: 0 | Refills: 0 | Status: COMPLETED | OUTPATIENT
Start: 2022-12-28

## 2023-01-04 RX ORDER — CETIRIZINE HYDROCHLORIDE 10 MG/1
10 TABLET, COATED ORAL
Qty: 0 | Refills: 0 | Status: COMPLETED | OUTPATIENT
Start: 2022-12-28

## 2023-01-04 NOTE — HISTORY OF PRESENT ILLNESS
[9] : 9 [Denies] : Denies [No] : No [Yes] : Yes [Declined] : Declined [TB] : Tuberculosis screening [Hep acute panel] : Hepatitis acute panel [de-identified] : Pain reported to knees,shoulders and hands bilaterally.  [Left upper extremity] : Left upper extremity [24g] : 24g [Medication Name: ___] : Medication Name: [unfilled] [Start Time: ___] : Medication Start Time: [unfilled] [End Time: ___] : Medication End Time: [unfilled] [IV discontinued. Intact. No signs or symptoms of IV complications noted. Time: ___] : IV discontinued. Intact. No signs or symptoms of IV complications noted. Time: [unfilled] [Patient  instructed to seek medical attention with signs and symptoms of adverse effects] : Patient  instructed to seek medical attention with signs and symptoms of adverse effects [Patient left unit in no acute distress] : Patient left unit in no acute distress [Medications administered as ordered and tolerated well.] : Medications administered as ordered and tolerated well. [de-identified] : 04:15 pm [de-identified] : Patient kept for observation for 30 mins post infusion. No adverse effects noted.

## 2023-01-19 ENCOUNTER — APPOINTMENT (OUTPATIENT)
Dept: RADIOLOGY | Facility: CLINIC | Age: 58
End: 2023-01-19
Payer: MEDICAID

## 2023-01-19 ENCOUNTER — OUTPATIENT (OUTPATIENT)
Dept: OUTPATIENT SERVICES | Facility: HOSPITAL | Age: 58
LOS: 1 days | End: 2023-01-19
Payer: MEDICAID

## 2023-01-19 ENCOUNTER — APPOINTMENT (OUTPATIENT)
Dept: MAMMOGRAPHY | Facility: CLINIC | Age: 58
End: 2023-01-19
Payer: MEDICAID

## 2023-01-19 DIAGNOSIS — Z98.890 OTHER SPECIFIED POSTPROCEDURAL STATES: Chronic | ICD-10-CM

## 2023-01-19 DIAGNOSIS — Z00.8 ENCOUNTER FOR OTHER GENERAL EXAMINATION: ICD-10-CM

## 2023-01-19 DIAGNOSIS — Z00.00 ENCOUNTER FOR GENERAL ADULT MEDICAL EXAMINATION WITHOUT ABNORMAL FINDINGS: ICD-10-CM

## 2023-01-19 PROCEDURE — 77063 BREAST TOMOSYNTHESIS BI: CPT | Mod: 26

## 2023-01-19 PROCEDURE — 77067 SCR MAMMO BI INCL CAD: CPT

## 2023-01-19 PROCEDURE — 77067 SCR MAMMO BI INCL CAD: CPT | Mod: 26

## 2023-01-19 PROCEDURE — 77063 BREAST TOMOSYNTHESIS BI: CPT

## 2023-01-19 PROCEDURE — 77080 DXA BONE DENSITY AXIAL: CPT | Mod: 26

## 2023-01-19 PROCEDURE — 77080 DXA BONE DENSITY AXIAL: CPT

## 2023-01-25 ENCOUNTER — APPOINTMENT (OUTPATIENT)
Dept: RHEUMATOLOGY | Facility: CLINIC | Age: 58
End: 2023-01-25
Payer: MEDICAID

## 2023-01-25 VITALS
OXYGEN SATURATION: 99 % | WEIGHT: 126 LBS | BODY MASS INDEX: 27.27 KG/M2 | HEART RATE: 69 BPM | DIASTOLIC BLOOD PRESSURE: 68 MMHG | TEMPERATURE: 97.6 F | SYSTOLIC BLOOD PRESSURE: 110 MMHG

## 2023-01-25 PROCEDURE — 96372 THER/PROPH/DIAG INJ SC/IM: CPT

## 2023-01-25 PROCEDURE — 99214 OFFICE O/P EST MOD 30 MIN: CPT | Mod: 25

## 2023-01-25 RX ORDER — METHOTREXATE 2.5 MG/1
2.5 TABLET ORAL
Qty: 24 | Refills: 3 | Status: DISCONTINUED | COMMUNITY
Start: 2022-03-30 | End: 2023-01-25

## 2023-01-25 RX ORDER — FOLIC ACID 1 MG/1
1 TABLET ORAL DAILY
Qty: 90 | Refills: 3 | Status: DISCONTINUED | COMMUNITY
Start: 2022-03-30 | End: 2023-01-25

## 2023-01-25 RX ORDER — OMEPRAZOLE 40 MG/1
40 CAPSULE, DELAYED RELEASE ORAL
Qty: 30 | Refills: 0 | Status: DISCONTINUED | COMMUNITY
Start: 2022-05-04 | End: 2023-01-25

## 2023-01-25 RX ORDER — PREDNISONE 5 MG/1
5 TABLET ORAL
Qty: 60 | Refills: 0 | Status: DISCONTINUED | COMMUNITY
Start: 2022-03-02 | End: 2023-01-25

## 2023-01-25 RX ORDER — METHYLPRED ACET/NACL,ISO-OS/PF 80 MG/ML
80 VIAL (ML) INJECTION
Qty: 1 | Refills: 0 | Status: COMPLETED | OUTPATIENT
Start: 2023-01-25

## 2023-01-25 RX ADMIN — METHYLPREDNISOLONE ACETATE 0 MG/ML: 80 INJECTION, SUSPENSION INTRA-ARTICULAR; INTRALESIONAL; INTRAMUSCULAR; SOFT TISSUE at 00:00

## 2023-01-25 NOTE — REVIEW OF SYSTEMS
[Loss Of Hearing] : hearing loss [Arthralgias] : arthralgias [Joint Pain] : joint pain [Joint Swelling] : joint swelling [Joint Stiffness] : joint stiffness [Anxiety] : anxiety [Negative] : Heme/Lymph [Fever] : no fever [Chills] : no chills [Feeling Poorly] : not feeling poorly [Eye Pain] : no eye pain [Red Eyes] : eyes not red [Eyesight Problems] : no eyesight problems [Dry Eyes] : no dryness of the eyes [Chest Pain] : no chest pain [Palpitations] : no palpitations [Shortness Of Breath] : no shortness of breath [Wheezing] : no wheezing [Cough] : no cough [SOB on Exertion] : no shortness of breath during exertion [Abdominal Pain] : no abdominal pain [Vomiting] : no vomiting [Constipation] : no constipation [Diarrhea] : no diarrhea [Confused] : no confusion [Limb Weakness] : no limb weakness [Difficulty Walking] : no difficulty walking [FreeTextEntry4] : unknown origin of Left sided hearing loss w/ chronic tinnitus- stable  [FreeTextEntry5] : varicosities working w/ vascular sx something done L 2021- nothing on R  [FreeTextEntry7] : intermittent acid reflux - improved; eventually had gallbladder resection 12/21 w/ + response, symptoms resolved [FreeTextEntry9] : diffuse throughout ankles, knees, fingers, and R elbow - RESOLVED in fingers and R elbow- active feet / ankles only now  [de-identified] : no recent psoriasis episodes (only 1  episode previously)  [de-identified] : denies any muscular weakness  [de-identified] : about treatments

## 2023-01-25 NOTE — HISTORY OF PRESENT ILLNESS
[FreeTextEntry1] : 1/25/2023\par \par - Recieved first simponi aria infusion w/steroids 1/4/2023 and it went well....pain was resolved up until 3 days ago when she started getting slight bl heel  and knee pain...not sure if it is from the cold weather or from inflammation\par - no taking anything else w/simponi aria....was too scared to take MTX....now is willing to consider a low dose of MTX or a low dose of plaquenil \par - denies much stiffness in the morning besides in her bl feet...takes 10 minutes for it to resolve\par \par - she has started to exercise \par \par 1) Inflammatory polyarthropathy:  CCP + RA (newly dx 3/22) \par ______________________________________________________________________________ \par \par (Initial HPI  3/2/22) \par 56 yo female coming in for consult for long-standing hx of polyarthropathy. \par \par - 2 years of joint pain and swelling, worsens with raining involving R wrist, R elbow, MCPs bilateral R > L, bilateral shoulders, bilateral knees, R ankle. \par - Morning stiffness for 10 minutes approx. \par - + Back pain worse in the AM, lower. Feels better in 5 minutes. \par - Takes OTC Tylenol/Motrin/Aleve with minimal improvement. \par - Ankle swelling has been assessed with XR, MRI - reportedly nothing remarkable. \par - Plantar fasciitis bilateral dx, has tried steroid injection in bilateral feet with some improvement, but couldn't pursue this option long-term. \par - Dx with arthritis, [place] - labs done recently - was told arthritis might be of rheum. origin\par - Dx with psoriasis a few years ago, hydrocortisone cream improved rash, was on left anterior ankle/distal leg   \par \par - Hx of hearing loss (not complete) in left ear - unknown origin. (took high dose of steroids without improvement in the past)\par  - Denies trigger finger, swelling in fingers, toe swelling, vision changes, diarrhea, abd pain, red eyes, night sweats, lymphadenopathy, chest pain, SOB, pleurisy. \par \par Social: \par - P 3 G 3 (oldest 35 y.o), , lives alone. \par - Non-smoker, social drinker \par \par FH + history of arthritis (aunt), no family hx of psoriasis

## 2023-01-25 NOTE — DATA REVIEWED
[FreeTextEntry1] : Labs\par 12/2022 nl CMP, CBC, ESR 60, Vectra 52\par \par Need copies of:\par - Recent labs done from Dr. Ayala ?\par - XR and MRI of Right ankle, reportedly unremarkable

## 2023-01-25 NOTE — ASSESSMENT
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 with recurrent plantar fascitis and asymmetrical presentation and possible psoriasis - patch on L ankle/ possible scalp lesion.. but CCP > 250 excellent and full response to pred at 20 mg daily.. feels great, not this good in many years.  \par Active synovitis today still present but much improved, subtle BL achilles, all and quad tendons- mild ttt with nearly fROM- markedly improved but worried about return of severe pain noted prior to starting simponi aria. Well tolerated.  \par Will add HCQ for baseline management, unlikely to have full control on only TNFI.. 200 mg = 3.5 mg/kg and will need baseline appropriate eye exam.  Referral to opth provided \par NO active psoriasis now...  \par GIven 80 mg kenalog today IM \par Labs stable on current dose, will repeat in 2 m.. prior to next visit. \par NOTE: \par Baseline Hand/ SI joint xrays 3/22 negative for erosions, no deformities / and neg HLAB27\par Initial presentation + achilles tendonitis + plantar fasciitis, R wrist and MCP/ MTP inflammation\par \par 2) Compliance:  unclear language barrier, reports understanding and speaks good English but following directions not always consistent.  Concerned about degree of active joint inflammation and repeated use of steroid for control.   Ideally with infusion tx should be better, RN team for support\par \par \par Plan:\par - administered 80 mg of depo-medrol in L deltoid\par \par - starting on 200 mg of plaquenil.....200 mg is 3.5 mg/kg for 57 kilograms \par \par - complete labs before next visit\par \par - Continue loading dose Simponii Aria with steroids, once in remission will stop solumedrol \par \par - RTO in 2/2/2023 and in April 2023 \par \par - Switching to infusions (Simponi Aria - if approved/ if not would consider ORencia  as monotx) ....STOP TAKING PREDNISONE (unless you are really desperate take 1-2 if you need it).......you do not need to resume MTX since you are scared of side effects, but you will have to wait a couple weeks to get approved for the infusion....you should hear from the office about infusions in 2 weeks if you do not call \par \par - Follow up w/Sheela Diaz in 2 months \par \par

## 2023-01-25 NOTE — PROCEDURE
[Today's Date:] : Date: [unfilled] [Risks] : risks [Benefits] : benefits [Alternatives] : alternatives [Consent Obtained] : written consent was obtained prior to the procedure and is detailed in the patient's record [Patient] : Prior to the start of the procedure a time out was taken and the identity of the patient was confirmed via name and date of birth with the patient. The correct site and the procedure to be performed were confirmed. The correct side was confirmed if applicable. The availability of the correct equipment was verified [Therapeutic] : therapeutic [#1 Site: ______] : #1 site identified in the [unfilled] [Alcohol] : alcohol [25 gauge 5/8  inch] : A 25 gauge 5/8 inch needle was used [Depomedrol ___ mg] : Depomedrol [unfilled] mg [Tolerated Well] : the patient tolerated the procedure well [No Complications] : there were no complications [Instructions Given] : handouts/patient instructions were given to patient [Patient Instructed to Call] : patient was instructed to call if redness at site, a decrease in range of motion or an increase in pain is noted after procedure. [FreeTextEntry1] : . This is a strong steroid. It can cause anxiety, hunger, irritability, trouble sleeping, rarely causes palpitations or chest pain but if present go to to ER and then let me know.\par \par

## 2023-01-25 NOTE — CONSULT LETTER
[Dear  ___] : Dear  [unfilled], [Consult Letter:] : I had the pleasure of evaluating your patient, [unfilled]. [Consult Closing:] : Thank you very much for allowing me to participate in the care of this patient.  If you have any questions, please do not hesitate to contact me. [Sincerely,] : Sincerely, [DrNilay  ___] : Dr. COLÓN [DrNilay ___] : Dr. COLÓN [FreeTextEntry2] : Hiren Eagle MD  [FreeTextEntry1] : Please see below for summary of  recent rheumatology evaluation and recommendations.\par \par  [FreeTextEntry3] : Sheela Diaz DNP, ANP-C\par Division or Rheumatology\par [facility]\par \par

## 2023-01-25 NOTE — PHYSICAL EXAM
[General Appearance - Alert] : alert [General Appearance - In No Acute Distress] : in no acute distress [General Appearance - Well Nourished] : well nourished [General Appearance - Well Developed] : well developed [General Appearance - Well-Appearing] : healthy appearing [Sclera] : the sclera and conjunctiva were normal [Extraocular Movements] : extraocular movements were intact [Neck Appearance] : the appearance of the neck was normal [Respiration, Rhythm And Depth] : normal respiratory rhythm and effort [Exaggerated Use Of Accessory Muscles For Inspiration] : no accessory muscle use [Auscultation Breath Sounds / Voice Sounds] : lungs were clear to auscultation bilaterally [Heart Rate And Rhythm] : heart rate was normal and rhythm regular [Heart Sounds] : normal S1 and S2 [Murmurs] : no murmurs [Heart Sounds Gallop] : no gallops [Heart Sounds Pericardial Friction Rub] : no pericardial rub [Edema] : there was no peripheral edema [Cervical Lymph Nodes Enlarged Posterior Bilaterally] : posterior cervical [Cervical Lymph Nodes Enlarged Anterior Bilaterally] : anterior cervical [Supraclavicular Lymph Nodes Enlarged Bilaterally] : supraclavicular [No CVA Tenderness] : no ~M costovertebral angle tenderness [No Spinal Tenderness] : no spinal tenderness [Abnormal Walk] : normal gait [Nail Clubbing] : no clubbing  or cyanosis of the fingernails [Involuntary Movements] : no involuntary movements were seen [Motor Tone] : muscle strength and tone were normal [Skin Color & Pigmentation] : normal skin color and pigmentation [Skin Turgor] : normal skin turgor [] : no rash [Skin Lesions] : no skin lesions [No Focal Deficits] : no focal deficits [Oriented To Time, Place, And Person] : oriented to person, place, and time [Impaired Insight] : insight and judgment were intact [Affect] : the affect was normal [Mood] : the mood was normal [FreeTextEntry1] : Scarring on left anterior ankle of old psoriasis (? bx) , no current evidence of psoriasis + response to topical steroids.. one lesion on scalp- tiny maculopapular scaly lesion

## 2023-02-01 ENCOUNTER — APPOINTMENT (OUTPATIENT)
Dept: RHEUMATOLOGY | Facility: CLINIC | Age: 58
End: 2023-02-01
Payer: MEDICAID

## 2023-02-01 ENCOUNTER — MED ADMIN CHARGE (OUTPATIENT)
Age: 58
End: 2023-02-01

## 2023-02-01 VITALS
SYSTOLIC BLOOD PRESSURE: 106 MMHG | TEMPERATURE: 98 F | OXYGEN SATURATION: 100 % | HEART RATE: 64 BPM | DIASTOLIC BLOOD PRESSURE: 68 MMHG | RESPIRATION RATE: 18 BRPM

## 2023-02-01 VITALS
OXYGEN SATURATION: 99 % | RESPIRATION RATE: 16 BRPM | TEMPERATURE: 96.4 F | DIASTOLIC BLOOD PRESSURE: 71 MMHG | SYSTOLIC BLOOD PRESSURE: 109 MMHG | HEART RATE: 69 BPM

## 2023-02-01 PROCEDURE — 96374 THER/PROPH/DIAG INJ IV PUSH: CPT | Mod: 59

## 2023-02-01 PROCEDURE — 96413 CHEMO IV INFUSION 1 HR: CPT

## 2023-02-01 RX ORDER — METHYLPREDNISOLONE 40 MG/ML
40 INJECTION, POWDER, LYOPHILIZED, FOR SOLUTION INTRAMUSCULAR; INTRAVENOUS
Qty: 1 | Refills: 0 | Status: COMPLETED | OUTPATIENT
Start: 2023-01-21

## 2023-02-01 RX ORDER — CETIRIZINE HYDROCHLORIDE 10 MG/1
10 TABLET, COATED ORAL
Qty: 0 | Refills: 0 | Status: COMPLETED | OUTPATIENT
Start: 2023-01-21

## 2023-02-01 RX ORDER — ACETAMINOPHEN 325 MG/1
325 TABLET ORAL
Qty: 0 | Refills: 0 | Status: COMPLETED | OUTPATIENT
Start: 2023-01-21

## 2023-02-01 RX ORDER — GOLIMUMAB 50 MG/4ML
50 SOLUTION INTRAVENOUS
Qty: 0 | Refills: 0 | Status: COMPLETED | OUTPATIENT
Start: 2023-01-21

## 2023-02-01 NOTE — HISTORY OF PRESENT ILLNESS
[Denies] : Denies [No] : No [Yes] : Yes [Declined] : Declined [TB] : Tuberculosis screening [Hep acute panel] : Hepatitis acute panel [Left upper extremity] : Left upper extremity [22g] : 22g [Medication Name: ___] : Medication Name: [unfilled] [Start Time: ___] : Medication Start Time: [unfilled] [End Time: ___] : Medication End Time: [unfilled] [IV discontinued. Intact. No signs or symptoms of IV complications noted. Time: ___] : IV discontinued. Intact. No signs or symptoms of IV complications noted. Time: [unfilled] [Patient  instructed to seek medical attention with signs and symptoms of adverse effects] : Patient  instructed to seek medical attention with signs and symptoms of adverse effects [Patient left unit in no acute distress] : Patient left unit in no acute distress [Medications administered as ordered and tolerated well.] : Medications administered as ordered and tolerated well. [de-identified] : 4:05pm

## 2023-02-02 ENCOUNTER — APPOINTMENT (OUTPATIENT)
Dept: RHEUMATOLOGY | Facility: CLINIC | Age: 58
End: 2023-02-02

## 2023-02-10 LAB
ALBUMIN SERPL ELPH-MCNC: 4.4 G/DL
ALP BLD-CCNC: 90 U/L
ALT SERPL-CCNC: 21 U/L
ANION GAP SERPL CALC-SCNC: 11 MMOL/L
AST SERPL-CCNC: 17 U/L
BASOPHILS # BLD AUTO: 0.02 K/UL
BASOPHILS NFR BLD AUTO: 0.3 %
BILIRUB SERPL-MCNC: 0.6 MG/DL
BUN SERPL-MCNC: 18 MG/DL
CALCIUM SERPL-MCNC: 9.4 MG/DL
CHLORIDE SERPL-SCNC: 107 MMOL/L
CO2 SERPL-SCNC: 25 MMOL/L
CREAT SERPL-MCNC: 0.82 MG/DL
CRP SERPL-MCNC: <3 MG/L
EGFR: 83 ML/MIN/1.73M2
EOSINOPHIL # BLD AUTO: 0.05 K/UL
EOSINOPHIL NFR BLD AUTO: 0.8 %
ERYTHROCYTE [SEDIMENTATION RATE] IN BLOOD BY WESTERGREN METHOD: 20 MM/HR
GLUCOSE SERPL-MCNC: 92 MG/DL
HCT VFR BLD CALC: 41.2 %
HGB BLD-MCNC: 12.9 G/DL
IMM GRANULOCYTES NFR BLD AUTO: 0.2 %
LYMPHOCYTES # BLD AUTO: 2.23 K/UL
LYMPHOCYTES NFR BLD AUTO: 35.5 %
MAN DIFF?: NORMAL
MCHC RBC-ENTMCNC: 27.3 PG
MCHC RBC-ENTMCNC: 31.3 GM/DL
MCV RBC AUTO: 87.1 FL
MONOCYTES # BLD AUTO: 0.42 K/UL
MONOCYTES NFR BLD AUTO: 6.7 %
NEUTROPHILS # BLD AUTO: 3.56 K/UL
NEUTROPHILS NFR BLD AUTO: 56.5 %
PLATELET # BLD AUTO: 318 K/UL
POTASSIUM SERPL-SCNC: 4.5 MMOL/L
PROT SERPL-MCNC: 6.9 G/DL
RBC # BLD: 4.73 M/UL
RBC # FLD: 14.8 %
SODIUM SERPL-SCNC: 143 MMOL/L
WBC # FLD AUTO: 6.29 K/UL

## 2023-02-28 ENCOUNTER — APPOINTMENT (OUTPATIENT)
Dept: RHEUMATOLOGY | Facility: CLINIC | Age: 58
End: 2023-02-28
Payer: MEDICAID

## 2023-02-28 VITALS
DIASTOLIC BLOOD PRESSURE: 70 MMHG | WEIGHT: 127 LBS | HEIGHT: 57 IN | TEMPERATURE: 98.2 F | HEART RATE: 89 BPM | BODY MASS INDEX: 27.4 KG/M2 | OXYGEN SATURATION: 100 % | SYSTOLIC BLOOD PRESSURE: 110 MMHG

## 2023-02-28 DIAGNOSIS — M70.61 TROCHANTERIC BURSITIS, RIGHT HIP: ICD-10-CM

## 2023-02-28 PROCEDURE — 99214 OFFICE O/P EST MOD 30 MIN: CPT | Mod: 25

## 2023-02-28 PROCEDURE — 20610 DRAIN/INJ JOINT/BURSA W/O US: CPT | Mod: RT

## 2023-02-28 RX ORDER — METHYLPRED ACET/NACL,ISO-OS/PF 80 MG/ML
80 VIAL (ML) INJECTION
Qty: 1 | Refills: 0 | Status: COMPLETED | OUTPATIENT
Start: 2023-02-28

## 2023-02-28 RX ADMIN — METHYLPREDNISOLONE ACETATE 0 MG/ML: 80 INJECTION, SUSPENSION INTRA-ARTICULAR; INTRALESIONAL; INTRAMUSCULAR; SOFT TISSUE at 00:00

## 2023-03-04 ENCOUNTER — MED ADMIN CHARGE (OUTPATIENT)
Age: 58
End: 2023-03-04

## 2023-03-04 RX ORDER — METHYLPRED ACET/NACL,ISO-OS/PF 80 MG/ML
80 VIAL (ML) INJECTION
Qty: 1 | Refills: 0 | Status: COMPLETED | OUTPATIENT
Start: 2023-03-04

## 2023-03-04 RX ADMIN — METHYLPREDNISOLONE ACETATE 0 MG/ML: 80 INJECTION, SUSPENSION INTRA-ARTICULAR; INTRALESIONAL; INTRAMUSCULAR; SOFT TISSUE at 00:00

## 2023-03-04 NOTE — HISTORY OF PRESENT ILLNESS
[FreeTextEntry1] : 2/28/2023\par - she is presenting in office w/swelling in bl hands that began last week.....she also c/o pain on the entire R side of her body ( R GTB, R knee and bl ankles R>L) as well as her trapezius area\par - no longer taking prednisone.. stiffness am lasts all day.. feeling generally miserable despite starting.. \par - Simponi Aria 1/25/23. and .. taking Plaquenil 200 mg (1/25/23) tolerated well but not helping at this point \par - Received first Simponi aria infusion w/steroids 1/4/2023 and it went well...continues loading dose w/ methylpred 40 mg IV last injection.  Last infusion 2/1/23... \par - has been  too scared to take MTX..\par \par - describes sleep as being normal, but wakes up in the middle of the night on occasion to take something for the pain\par - up to date on DEXA  1/19/23 w/ most severe T score -2.5... no comparison (different machine) but previously -2.3 at this level... FN -1.9 previously 0.8 and TH -1.4 previously 0.7.  No previous treatment or known fractures.  \par \par \par 1) Inflammatory polyarthropathy:  CCP + RA (newly dx 3/22) \par 2) Osteoporosis \par ______________________________________________________________________________ \par \par (Initial HPI  3/2/22) \par 56 yo female coming in for consult for long-standing hx of polyarthropathy. \par \par - 2 years of joint pain and swelling, worsens with raining involving R wrist, R elbow, MCPs bilateral R > L, bilateral shoulders, bilateral knees, R ankle. \par - Morning stiffness for 10 minutes approx. \par - + Back pain worse in the AM, lower. Feels better in 5 minutes. \par - Takes OTC Tylenol/Motrin/Aleve with minimal improvement. \par - Ankle swelling has been assessed with XR, MRI - reportedly nothing remarkable. \par - Plantar fasciitis bilateral dx, has tried steroid injection in bilateral feet with some improvement, but couldn't pursue this option long-term. \par - Dx with arthritis, [place] - labs done recently - was told arthritis might be of rheum. origin\par - Dx with psoriasis a few years ago, hydrocortisone cream improved rash, was on left anterior ankle/distal leg   \par \par - Hx of hearing loss (not complete) in left ear - unknown origin. (took high dose of steroids without improvement in the past)\par  - Denies trigger finger, swelling in fingers, toe swelling, vision changes, diarrhea, abd pain, red eyes, night sweats, lymphadenopathy, chest pain, SOB, pleurisy. \par \par Social: \par - P 3 G 3 (oldest 35 y.o), , lives alone. \par - Non-smoker, social drinker \par \par FH + history of arthritis (aunt), no family hx of psoriasis

## 2023-03-04 NOTE — CONSULT LETTER
[Dear  ___] : Dear  [unfilled], [Consult Letter:] : I had the pleasure of evaluating your patient, [unfilled]. [Consult Closing:] : Thank you very much for allowing me to participate in the care of this patient.  If you have any questions, please do not hesitate to contact me. [Sincerely,] : Sincerely, [DrNilay  ___] : Dr. COLÓN [DrNilay ___] : Dr. COLÓN [FreeTextEntry2] : Hiren Eagle MD  [FreeTextEntry1] : Please see below for summary of  recent rheumatology evaluation and recommendations.\par \par  [FreeTextEntry3] : Sheela Diaz DNP, ANP-C\par Division or Rheumatology\par [facility]\par \par

## 2023-03-04 NOTE — REVIEW OF SYSTEMS
[Loss Of Hearing] : hearing loss [Arthralgias] : arthralgias [Joint Pain] : joint pain [Joint Swelling] : joint swelling [Joint Stiffness] : joint stiffness [Anxiety] : anxiety [Negative] : Heme/Lymph [Feeling Poorly] : feeling poorly [Feeling Tired] : feeling tired [As Noted in HPI] : as noted in HPI [Fever] : no fever [Chills] : no chills [Eye Pain] : no eye pain [Red Eyes] : eyes not red [Eyesight Problems] : no eyesight problems [Dry Eyes] : no dryness of the eyes [Chest Pain] : no chest pain [Palpitations] : no palpitations [Shortness Of Breath] : no shortness of breath [Wheezing] : no wheezing [Cough] : no cough [Abdominal Pain] : no abdominal pain [SOB on Exertion] : no shortness of breath during exertion [Vomiting] : no vomiting [Constipation] : no constipation [Diarrhea] : no diarrhea [Limb Weakness] : no limb weakness [Confused] : no confusion [Difficulty Walking] : no difficulty walking [FreeTextEntry4] : unknown origin of Left sided hearing loss w/ chronic tinnitus- stable  [FreeTextEntry5] : varicosities working w/ vascular sx something done L 2021- nothing on R  [FreeTextEntry7] : intermittent acid reflux - improved; eventually had gallbladder resection 12/21 w/ + response, symptoms resolved [FreeTextEntry9] : diffuse throughout ankles R>L, R GTB, R knee, and bl hands-  [de-identified] : no recent psoriasis episodes (only 1  episode previously)  [de-identified] : denies any muscular weakness  [de-identified] : about treatments

## 2023-03-04 NOTE — PROCEDURE

## 2023-03-04 NOTE — PHYSICAL EXAM
[General Appearance - Alert] : alert [General Appearance - In No Acute Distress] : in no acute distress [General Appearance - Well Nourished] : well nourished [General Appearance - Well Developed] : well developed [General Appearance - Well-Appearing] : healthy appearing [Sclera] : the sclera and conjunctiva were normal [Neck Appearance] : the appearance of the neck was normal [Respiration, Rhythm And Depth] : normal respiratory rhythm and effort [Exaggerated Use Of Accessory Muscles For Inspiration] : no accessory muscle use [Auscultation Breath Sounds / Voice Sounds] : lungs were clear to auscultation bilaterally [Heart Rate And Rhythm] : heart rate was normal and rhythm regular [Heart Sounds] : normal S1 and S2 [Heart Sounds Gallop] : no gallops [Murmurs] : no murmurs [Heart Sounds Pericardial Friction Rub] : no pericardial rub [Edema] : there was no peripheral edema [Cervical Lymph Nodes Enlarged Posterior Bilaterally] : posterior cervical [Cervical Lymph Nodes Enlarged Anterior Bilaterally] : anterior cervical [Supraclavicular Lymph Nodes Enlarged Bilaterally] : supraclavicular [No CVA Tenderness] : no ~M costovertebral angle tenderness [No Spinal Tenderness] : no spinal tenderness [Abnormal Walk] : normal gait [Nail Clubbing] : no clubbing  or cyanosis of the fingernails [Involuntary Movements] : no involuntary movements were seen [Motor Tone] : muscle strength and tone were normal [Skin Color & Pigmentation] : normal skin color and pigmentation [Skin Turgor] : normal skin turgor [] : no rash [Skin Lesions] : no skin lesions [No Focal Deficits] : no focal deficits [Oriented To Time, Place, And Person] : oriented to person, place, and time [Impaired Insight] : insight and judgment were intact [Affect] : the affect was normal [Mood] : the mood was normal [FreeTextEntry1] : Scarring on left anterior ankle of old psoriasis (? bx) , no current evidence of psoriasis + response to topical steroids.. one lesion on scalp- tiny maculopapular scaly lesion

## 2023-03-04 NOTE — ASSESSMENT
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 with recurrent plantar fascitis and asymmetrical presentation and possible psoriasis - patch on L ankle/ possible scalp lesion.. but CCP > 250 w/ Vectra 12/22 of 52 excellent and full response to pred at 20 mg daily.. feels great, not this good in many years. Eventually started on Simponi Aria 1/23- but despite loading dose w/ steroids, continues active synovitis. Added  mg well tolerated but not effective as yet.. was too anxious to consider MTX ... \par Presenting today w/ overt symmetrical synovitis hands and severe R GTB- 80 mg depo given now... repeat Simponi infusion scheduled 1 month.. may need to bridge with oral steroids.. advised to call immediately if swelling returns / or doesn't improve after today.. \par Need to confirm opth eval... \par NO active psoriasis now...  \par Labs stable on current dose, will repeat in 2 m.. prior to next visit. \par NOTE: \par Baseline Hand/ SI joint xrays 3/22 negative for erosions, no deformities / and neg HLAB27\par Initial presentation + Achilles tendonitis + plantar fasciitis, R wrist and MCP/ MTP inflammation\par \par 2) Compliance:  unclear language barrier, reports understanding and speaks good English but following directions not always consistent.  Concerned about degree of active joint inflammation and repeated use of steroid for control.   Ideally with infusion tx should be better, RN team for support... reports compliance with addition of HCQ\par \par 3) Osteoporosis:  DEXA  1/19/23 w/ most severe T score -2.5... no comparison (different machine) but previously -2.3 at this level... FN -1.9 previously 0.8 and TH -1.4 previously 0.7.  No previous treatment or known fractures.\par \par \par Plan:\par - administered 80 mg of Depo-Medrol in R GTB\par \par - continue w/Plaquenil 200 mg ......200 mg is 3.5 mg/kg for 57 kilograms......will discuss d/c and restarting MTX during next visit (she is scared of MTX side effects)\par \par - continue Simponi aria infusions....next infusion 3/29/2023 \par \par - will need to discuss tx for OP but too many issues now, would like to get RA controlled first.  Would recommend BP as initial tx in this setting.. \par \par - needs metabolic eval next lab draw 3/22/23 \par \par \par \par \par \par - complete labs before next visit\par \par - Continue loading dose Simponii Aria with steroids, once in remission will stop solumedrol \par \par - RTO in 2/2/2023 and in April 2023 \par \par - Switching to infusions (Simponi Aria - if approved/ if not would consider ORencia  as monotx) ....STOP TAKING PREDNISONE (unless you are really desperate take 1-2 if you need it).......you do not need to resume MTX since you are scared of side effects, but you will have to wait a couple weeks to get approved for the infusion....you should hear from the office about infusions in 2 weeks if you do not call \par \par - Follow up w/Sheela Diaz in 2 months \par \par

## 2023-03-13 ENCOUNTER — NON-APPOINTMENT (OUTPATIENT)
Age: 58
End: 2023-03-13

## 2023-03-23 NOTE — ED PROVIDER NOTE - IV ALTEPLASE EXCL ABS HIDDEN
Patient is scheduled for a colonoscopy with  Dr. Hua Maguire  on 6/1   Please send Nulytely prep to selected pharmacy   show

## 2023-03-29 ENCOUNTER — MED ADMIN CHARGE (OUTPATIENT)
Age: 58
End: 2023-03-29

## 2023-03-29 ENCOUNTER — APPOINTMENT (OUTPATIENT)
Dept: RHEUMATOLOGY | Facility: CLINIC | Age: 58
End: 2023-03-29
Payer: MEDICAID

## 2023-03-29 VITALS
SYSTOLIC BLOOD PRESSURE: 96 MMHG | HEART RATE: 76 BPM | OXYGEN SATURATION: 99 % | TEMPERATURE: 98 F | DIASTOLIC BLOOD PRESSURE: 60 MMHG | RESPIRATION RATE: 17 BRPM

## 2023-03-29 VITALS
HEART RATE: 83 BPM | DIASTOLIC BLOOD PRESSURE: 62 MMHG | SYSTOLIC BLOOD PRESSURE: 103 MMHG | OXYGEN SATURATION: 99 % | RESPIRATION RATE: 17 BRPM | TEMPERATURE: 98 F

## 2023-03-29 PROCEDURE — 96413 CHEMO IV INFUSION 1 HR: CPT

## 2023-03-29 PROCEDURE — 96374 THER/PROPH/DIAG INJ IV PUSH: CPT | Mod: 59

## 2023-03-29 RX ORDER — ACETAMINOPHEN 325 MG/1
325 TABLET ORAL
Qty: 0 | Refills: 0 | Status: COMPLETED | OUTPATIENT
Start: 2023-02-18

## 2023-03-29 RX ORDER — METHYLPREDNISOLONE 40 MG/ML
40 INJECTION, POWDER, LYOPHILIZED, FOR SOLUTION INTRAMUSCULAR; INTRAVENOUS
Qty: 1 | Refills: 0 | Status: COMPLETED | OUTPATIENT
Start: 2023-02-18

## 2023-03-29 RX ORDER — CETIRIZINE HYDROCHLORIDE 10 MG/1
10 TABLET, COATED ORAL
Qty: 0 | Refills: 0 | Status: COMPLETED | OUTPATIENT
Start: 2023-02-18

## 2023-03-29 RX ORDER — GOLIMUMAB 50 MG/4ML
50 SOLUTION INTRAVENOUS
Qty: 0 | Refills: 0 | Status: COMPLETED | OUTPATIENT
Start: 2023-02-18

## 2023-03-29 NOTE — HISTORY OF PRESENT ILLNESS
[6] : 6 [Denies] : Denies [No] : No [Yes] : Yes [Declined] : Declined [TB] : Tuberculosis screening [Hep acute panel] : Hepatitis acute panel [de-identified] : left shoulder, left lower back [Right upper extremity] : Right upper extremity [24g] : 24g [Medication Name: ___] : Medication Name: [unfilled] [Start Time: ___] : Medication Start Time: [unfilled] [End Time: ___] : Medication End Time: [unfilled] [IV discontinued. Intact. No signs or symptoms of IV complications noted. Time: ___] : IV discontinued. Intact. No signs or symptoms of IV complications noted. Time: [unfilled] [Patient  instructed to seek medical attention with signs and symptoms of adverse effects] : Patient  instructed to seek medical attention with signs and symptoms of adverse effects [Patient left unit in no acute distress] : Patient left unit in no acute distress [Medications administered as ordered and tolerated well.] : Medications administered as ordered and tolerated well. [de-identified] : 3:50 pm [de-identified] : script for blood work given to pt\par \par next infusion- 5/24/23 at 4:00 pm

## 2023-04-07 ENCOUNTER — APPOINTMENT (OUTPATIENT)
Dept: RHEUMATOLOGY | Facility: CLINIC | Age: 58
End: 2023-04-07
Payer: MEDICAID

## 2023-04-07 VITALS
BODY MASS INDEX: 27.48 KG/M2 | OXYGEN SATURATION: 97 % | SYSTOLIC BLOOD PRESSURE: 102 MMHG | TEMPERATURE: 97.6 F | DIASTOLIC BLOOD PRESSURE: 68 MMHG | HEART RATE: 77 BPM | WEIGHT: 127 LBS

## 2023-04-07 DIAGNOSIS — M21.41 FLAT FOOT [PES PLANUS] (ACQUIRED), RIGHT FOOT: ICD-10-CM

## 2023-04-07 DIAGNOSIS — M21.42 FLAT FOOT [PES PLANUS] (ACQUIRED), RIGHT FOOT: ICD-10-CM

## 2023-04-07 DIAGNOSIS — M72.2 PLANTAR FASCIAL FIBROMATOSIS: ICD-10-CM

## 2023-04-07 PROCEDURE — 96372 THER/PROPH/DIAG INJ SC/IM: CPT | Mod: 59

## 2023-04-07 PROCEDURE — 99214 OFFICE O/P EST MOD 30 MIN: CPT | Mod: 25

## 2023-04-07 RX ORDER — KETOROLAC TROMETHAMINE 30 MG/ML
30 INJECTION, SOLUTION INTRAMUSCULAR; INTRAVENOUS
Qty: 1 | Refills: 0 | Status: COMPLETED | OUTPATIENT
Start: 2023-04-07

## 2023-04-07 RX ORDER — METHYLPRED ACET/NACL,ISO-OS/PF 40 MG/ML
40 VIAL (ML) INJECTION
Qty: 1 | Refills: 0 | Status: COMPLETED | OUTPATIENT
Start: 2023-04-07

## 2023-04-07 RX ADMIN — METHYLPREDNISOLONE ACETATE 0 MG/ML: 40 INJECTION, SUSPENSION INTRA-ARTICULAR; INTRALESIONAL; INTRAMUSCULAR; SOFT TISSUE at 00:00

## 2023-04-07 RX ADMIN — KETOROLAC TROMETHAMINE 0 MG/ML: 30 INJECTION, SOLUTION INTRAMUSCULAR; INTRAVENOUS at 00:00

## 2023-04-07 NOTE — REVIEW OF SYSTEMS
[Loss Of Hearing] : hearing loss [Arthralgias] : arthralgias [Joint Pain] : joint pain [Joint Swelling] : joint swelling [Joint Stiffness] : joint stiffness [Anxiety] : anxiety [Negative] : Heme/Lymph [Lower Ext Edema] : lower extremity edema [As Noted in HPI] : as noted in HPI [Fever] : no fever [Chills] : no chills [Feeling Poorly] : not feeling poorly [Feeling Tired] : not feeling tired [Eye Pain] : no eye pain [Red Eyes] : eyes not red [Eyesight Problems] : no eyesight problems [Dry Eyes] : no dryness of the eyes [Chest Pain] : no chest pain [Palpitations] : no palpitations [Shortness Of Breath] : no shortness of breath [Wheezing] : no wheezing [Cough] : no cough [SOB on Exertion] : no shortness of breath during exertion [Abdominal Pain] : no abdominal pain [Vomiting] : no vomiting [Constipation] : no constipation [Diarrhea] : no diarrhea [Skin Lesions] : no skin lesions [Skin Wound] : no skin wound [Itching] : no itching [Confused] : no confusion [Limb Weakness] : no limb weakness [Difficulty Walking] : no difficulty walking [Swollen Glands] : no swollen glands [Swollen Glands In The Neck] : no swollen glands in the neck [FreeTextEntry2] : weight stable  [FreeTextEntry4] : unknown origin of Left sided hearing loss w/ chronic tinnitus- stable  [FreeTextEntry5] : varicosities working w/ vascular sx something done L 2021- nothing on R ; RLE swelling >LLE [FreeTextEntry9] : diffuse throughout ankles R>L, plantar fascia R>L ; no further pain at R GTB,  nothing in hands/wrists/knees today  [de-identified] : no recent psoriasis episodes (only 1  episode previously) and still  [de-identified] : about treatments

## 2023-04-07 NOTE — PHYSICAL EXAM
[General Appearance - Alert] : alert [General Appearance - In No Acute Distress] : in no acute distress [General Appearance - Well Nourished] : well nourished [General Appearance - Well Developed] : well developed [General Appearance - Well-Appearing] : healthy appearing [Sclera] : the sclera and conjunctiva were normal [Neck Appearance] : the appearance of the neck was normal [Respiration, Rhythm And Depth] : normal respiratory rhythm and effort [Exaggerated Use Of Accessory Muscles For Inspiration] : no accessory muscle use [Auscultation Breath Sounds / Voice Sounds] : lungs were clear to auscultation bilaterally [Heart Rate And Rhythm] : heart rate was normal and rhythm regular [Heart Sounds] : normal S1 and S2 [Heart Sounds Gallop] : no gallops [Murmurs] : no murmurs [Heart Sounds Pericardial Friction Rub] : no pericardial rub [Edema] : there was no peripheral edema [Cervical Lymph Nodes Enlarged Posterior Bilaterally] : posterior cervical [Cervical Lymph Nodes Enlarged Anterior Bilaterally] : anterior cervical [Supraclavicular Lymph Nodes Enlarged Bilaterally] : supraclavicular [No CVA Tenderness] : no ~M costovertebral angle tenderness [No Spinal Tenderness] : no spinal tenderness [Abnormal Walk] : normal gait [Nail Clubbing] : no clubbing  or cyanosis of the fingernails [Involuntary Movements] : no involuntary movements were seen [Motor Tone] : muscle strength and tone were normal [Skin Color & Pigmentation] : normal skin color and pigmentation [Skin Turgor] : normal skin turgor [] : no rash [Skin Lesions] : no skin lesions [No Focal Deficits] : no focal deficits [Oriented To Time, Place, And Person] : oriented to person, place, and time [Impaired Insight] : insight and judgment were intact [Affect] : the affect was normal [Mood] : the mood was normal [Oropharynx] : the oropharynx was normal [FreeTextEntry1] : Scarring on left anterior ankle of old psoriasis (? bx) , no current evidence of psoriasis + response to topical steroids.. one lesion on scalp- tiny maculopapular scaly lesion (not present today)

## 2023-04-07 NOTE — PROCEDURE
[Today's Date:] : Date: [unfilled] [Risks] : risks [Benefits] : benefits [Alternatives] : alternatives [Consent Obtained] : written consent was obtained prior to the procedure and is detailed in the patient's record [Patient] : Prior to the start of the procedure a time out was taken and the identity of the patient was confirmed via name and date of birth with the patient. The correct site and the procedure to be performed were confirmed. The correct side was confirmed if applicable. The availability of the correct equipment was verified [Therapeutic] : therapeutic [#1 Site: ______] : #1 site identified in the [unfilled] [Alcohol] : alcohol [25 gauge 5/8  inch] : A 25 gauge 5/8 inch needle was used [Depomedrol ___ mg] : Depomedrol [unfilled] mg [Tolerated Well] : the patient tolerated the procedure well [No Complications] : there were no complications [Instructions Given] : handouts/patient instructions were given to patient [Patient Instructed to Call] : patient was instructed to call if redness at site, a decrease in range of motion or an increase in pain is noted after procedure. [#2 Site: ___] : # 2 site identified in the [unfilled] [de-identified] : x 2 [de-identified] : toradol 30 mg  [FreeTextEntry1] : - you were given Toradol 30 mg IM today...  Make sure that you are well hydrated and avoid any other NSAIDs today.  \par \par  This is a strong steroid.. can cause anxiety, hunger, irritability, trouble sleeping, rarely causes palpitations or chest pain but if present go to ER and then let me know \par \par \par \par

## 2023-04-07 NOTE — ASSESSMENT
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 with recurrent plantar fascitis and asymmetrical presentation and possible psoriasis - patch on L ankle/ possible scalp lesion.. but CCP > 250 w/ Vectra 12/22 of 52 excellent and full response to pred at 20 mg daily.. feels great, not this good in many years. Eventually started on Simponi Aria 1/23- but despite loading dose w/ steroids, continues active synovitis. Added  mg (200 mg is 3.5 mg/kg for 57 kilograms) well tolerated but not effective as yet.. now open to starting low dose MTX (4 pills/week) for persistent \par synovitis in R ankle fullness, R plantar fasciites, cervical pain and R sided GH pain and feeling poorly within 2 wks of infusion... \par For immediate relief, given  40 mg depo and 30mg Toradol given now... repeat Simponi infusion scheduled 1 month.. Hopeful once she starts MTX that swelling and pain will improve  \par Need to confirm opth eval...referral given today \par NO active psoriasis now...  \par Labs stable on current dose, need repeat bloodwork now, patient aware and will go to lab \par NOTE: \par Baseline Hand/ SI joint xrays 3/22 negative for erosions, no deformities / and neg HLAB27\par Initial presentation + Achilles tendonitis + plantar fasciitis, R wrist and MCP/ MTP inflammation\par \par 2) Compliance:  still unclear language barrier, reports understanding and speaks good English but following directions not always consistent.  Concerned about degree of active joint inflammation and repeated use of steroid for control.   Ideally with infusion tx should be better, RN team for support... reports compliance with addition of HCQ\par \par 3) Osteoporosis:  DEXA  1/19/23 w/ most severe T score -2.5... no comparison (different machine) but previously -2.3 at this level... FN -1.9 previously 0.8 and TH -1.4 previously 0.7.  No previous treatment or known fractures.\par RIsks:  long term steroids > 3 m (off now), age, RA\par -PCP suggested Prolia, only mild osteoporosis, guidelines suggest starting with BP.. , could be a candidate for biphosphate but if compliance or language barrier a concern, could do Reclast \par \par \par Plan:\par - administered 40 mg of Depo-Medrol and IM Toradol 30 mg today . \par \par - continue w/Plaquenil 200 mg .....Do NOT stop this medication.  You do need to let your opthalmology doctor know that you have started this medicine and you need to be seen within the next 6 months ...\par \par - Start MTX (methotrexate) at 4 tabs (10 mg)  po wk taken all together at the same time.  \par Most people tolerate best at night before bed and in middle of wk\par Do labs in 2 wks after starting \par Will then need to do labs every month for the first 6 months.  \par - let us know if you have any problems (nausea, hair loss, mouth sores).  Ideally these will be minimized by....also taking.. You should tolerate this low dose with NO side effects.  \par \par - Take Folic acid 1-3 mg daily on all the days you don't take MTX.  Start at 1 mg and increase in having any SE. \par \par - Call if you develop any infections (you may need to hold MTX based on need for antibiotics)\par \par - Also: hold MTX for 1-2 wk for any vaccines.  \par \par - continue with your infusions of Simponi Aria - - Continue loading dose Simponii Aria with steroids, once in remission will stop Solu-Medrol \par \par - will need to discuss tx for OP but too many issues now, would like to get RA controlled first.  Would recommend BP as initial tx in this setting.. See above \par \par -RTO 4-6weeks\par \par \par

## 2023-04-07 NOTE — CONSULT LETTER
[Dear  ___] : Dear  [unfilled], [Consult Letter:] : I had the pleasure of evaluating your patient, [unfilled]. [Consult Closing:] : Thank you very much for allowing me to participate in the care of this patient.  If you have any questions, please do not hesitate to contact me. [Sincerely,] : Sincerely, [DrNilay  ___] : Dr. COLÓN [DrNilay ___] : Dr. 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 with recurrent plantar fascitis and asymmetrical presentation and possible psoriasis - patch on L ankle/ possible scalp lesion.. but CCP > 250 w/ Vectra 12/22 of 52 excellent and full response to pred at 20 mg daily.. feels great, not this good in many years. Eventually started on Simponi Aria 1/23- but despite loading dose w/ steroids, continues active synovitis. Added  mg (200 mg is 3.5 mg/kg for 57 kilograms) well tolerated but not effective as yet.. now open to starting low dose MTX (4 pills/week) for persistent \par synovitis in R ankle fullness, R plantar fasciites, cervical pain and R sided GH pain and feeling poorly within 2 wks of infusion... \par For immediate relief, given  40 mg depo and 30mg Toradol given now... repeat Simponi infusion scheduled 1 month.. Hopeful once she starts MTX that swelling and pain will improve  \par Need to confirm opth eval...referral given today \par NO active psoriasis now...  \par Labs stable on current dose, need repeat bloodwork now, patient aware and will go to lab \par NOTE: \par Baseline Hand/ SI joint xrays 3/22 negative for erosions, no deformities / and neg HLAB27\par Initial presentation + Achilles tendonitis + plantar fasciitis, R wrist and MCP/ MTP inflammation\par \par 2) Compliance:  still unclear language barrier, reports understanding and speaks good English but following directions not always consistent.  Concerned about degree of active joint inflammation and repeated use of steroid for control.   Ideally with infusion tx should be better, RN team for support... reports compliance with addition of HCQ\par \par 3) Osteoporosis:  DEXA  1/19/23 w/ most severe T score -2.5... no comparison (different machine) but previously -2.3 at this level... FN -1.9 previously 0.8 and TH -1.4 previously 0.7.  No previous treatment or known fractures.\par RIsks:  long term steroids > 3 m (off now), age, RA\par -PCP suggested Prolia, only mild osteoporosis, guidelines suggest starting with BP.. , could be a candidate for biphosphate but if compliance or language barrier a concern, could do Reclast \par \par \par Plan:\par - administered 40 mg of Depo-Medrol and IM Toradol 30 mg today . \par \par - continue w/Plaquenil 200 mg .....Do NOT stop this medication.  You do need to let your opthalmology doctor know that you have started this medicine and you need to be seen within the next 6 months ...\par \par - Start MTX (methotrexate) at 4 tabs (10 mg)  po wk taken all together at the same time.  \par Most people tolerate best at night before bed and in middle of wk\par Do labs in 2 wks after starting \par Will then need to do labs every month for the first 6 months.  \par - let us know if you have any problems (nausea, hair loss, mouth sores).  Ideally these will be minimized by....also taking.. You should tolerate this low dose with NO side effects.  \par \par - Take Folic acid 1-3 mg daily on all the days you don't take MTX.  Start at 1 mg and increase in having any SE. \par \par - Call if you develop any infections (you may need to hold MTX based on need for antibiotics)\par \par - Also: hold MTX for 1-2 wk for any vaccines.  \par \par - continue with your infusions of Simponi Aria - - Continue loading dose Simponii Aria with steroids, once in remission will stop Solu-Medrol \par \par - will need to discuss tx for OP but too many issues now, would like to get RA controlled first.  Would recommend BP as initial tx in this setting.. See above \par \par -RTO 4-6weeks\par \par \par  [FreeTextEntry3] : Sheela Diaz DNP, ANP-C\par Division or Rheumatology\par [facility]\par \par

## 2023-04-07 NOTE — HISTORY OF PRESENT ILLNESS
[FreeTextEntry1] : 4/7/2023\par \par RA:\par Received 80mg Depo last visit and felt well initially but then needed an additional 20mg Pred x 7 days. After this her joint pain and swelling to hands, R knee, ankles improved and felt much better. Received Simponi Aria infusions, started (1/4/2023) and .. taking Plaquenil 200 mg (1/25/23) tolerated well but only gets relief for ~2 weeks before joint pain returns. AM stiffness < 5 minutes except R ankle pain/ swelling persists.\par -now c/o of upper neck and head pain, applying warm compress and tylenol but pain 8/10. First happened last week but came back. Bought new pillow without relief. Pain is aching, no N/T. Occasionally will get a headache from neck pain. Neck is stiff. Never happened. No injuries or falls. \par -R shoulder pain: Doesn't hurt everyday but 5/10 when it does occur. Denies N/T. \par -R pleuritic/rib pain: worse with sneezing but pain not worse with deep breath. First started today, pain is aching. Has not taken anything for it today. Never happened before. Denies injury or falls \par -Endorsing hair loss - first noticed a couple weeks ago. No bald spots but noticeable thinning at bilateral hair lines with possible receding hair line\par -Still getting plantar fascitis R>L. Massages out with tennis ball. Steroid inj in past (x3- last dose > 6 m ago) without relief in pain. Denies swelling or pain in hands. \par \par Denies rash or psoriasis. Denies fever, chills, sicca, inflammatory eye pain, sob, chest pain, palpitations, abdominal pain, n/v/d.\par \par HM:\par - up to date on DEXA  1/19/23 w/ most severe T score -2.5... no comparison (different machine) but previously -2.3 at this level... FN -1.9 previously 0.8 and TH -1.4 previously 0.7.  No previous treatment or known fractures.  \par \par \par 1) Inflammatory polyarthropathy:  CCP + RA (newly dx 3/22) \par 2) Osteoporosis \par ______________________________________________________________________________ \par \par (Initial HPI  3/2/22) \par 58 yo female coming in for consult for long-standing hx of polyarthropathy. \par \par - 2 years of joint pain and swelling, worsens with raining involving R wrist, R elbow, MCPs bilateral R > L, bilateral shoulders, bilateral knees, R ankle. \par - Morning stiffness for 10 minutes approx. \par - + Back pain worse in the AM, lower. Feels better in 5 minutes. \par - Takes OTC Tylenol/Motrin/Aleve with minimal improvement. \par - Ankle swelling has been assessed with XR, MRI - reportedly nothing remarkable. \par - Plantar fasciitis bilateral dx, has tried steroid injection in bilateral feet with some improvement, but couldn't pursue this option long-term. \par - Dx with arthritis, [place] - labs done recently - was told arthritis might be of rheum. origin\par - Dx with psoriasis a few years ago, hydrocortisone cream improved rash, was on left anterior ankle/distal leg   \par \par - Hx of hearing loss (not complete) in left ear - unknown origin. (took high dose of steroids without improvement in the past)\par  - Denies trigger finger, swelling in fingers, toe swelling, vision changes, diarrhea, abd pain, red eyes, night sweats, lymphadenopathy, chest pain, SOB, pleurisy. \par \par Social: \par - P 3 G 3 (oldest 35 y.o), , lives alone. \par - Non-smoker, social drinker \par \par FH + history of arthritis (aunt), no family hx of psoriasis

## 2023-04-12 NOTE — ED ADULT NURSE NOTE - AS SC BRADEN MOBILITY
O-T Plasty Text: Due to geometric and functional constraints, a flap reconstruction was performed to reconstruct the defect.  To that end, adjacent tissue was incised and carried over to close the defect in the following manner: The defect edges were debeveled with a #15 scalpel blade.  Given the location of the defect, shape of the defect and the proximity to free margins an O-T plasty was deemed most appropriate.  Using a sterile surgical marker, an appropriate O-T plasty was drawn incorporating the defect and placing the expected incisions within the relaxed skin tension lines where possible.    The area thus outlined was incised deep to adipose tissue with a #15 scalpel blade.  The skin margins were undermined to an appropriate distance in all directions utilizing iris scissors. (4) no limitation

## 2023-04-26 ENCOUNTER — NON-APPOINTMENT (OUTPATIENT)
Age: 58
End: 2023-04-26

## 2023-05-11 LAB
25(OH)D3 SERPL-MCNC: 34.4 NG/ML
ALBUMIN SERPL ELPH-MCNC: 3.8 G/DL
ALP BLD-CCNC: 70 U/L
ALT SERPL-CCNC: 11 U/L
ANION GAP SERPL CALC-SCNC: 11 MMOL/L
AST SERPL-CCNC: 14 U/L
BASOPHILS # BLD AUTO: 0.02 K/UL
BASOPHILS NFR BLD AUTO: 0.3 %
BILIRUB SERPL-MCNC: 0.6 MG/DL
BUN SERPL-MCNC: 8 MG/DL
CALCIUM SERPL-MCNC: 9.3 MG/DL
CALCIUM SERPL-MCNC: 9.6 MG/DL
CHLORIDE SERPL-SCNC: 107 MMOL/L
CO2 SERPL-SCNC: 23 MMOL/L
CREAT SERPL-MCNC: 0.7 MG/DL
CRP SERPL-MCNC: 46 MG/L
EGFR: 100 ML/MIN/1.73M2
EOSINOPHIL # BLD AUTO: 0.15 K/UL
EOSINOPHIL NFR BLD AUTO: 1.9 %
ERYTHROCYTE [SEDIMENTATION RATE] IN BLOOD BY WESTERGREN METHOD: 86 MM/HR
GLUCOSE SERPL-MCNC: 94 MG/DL
HCT VFR BLD CALC: 35.5 %
HGB BLD-MCNC: 11.2 G/DL
IMM GRANULOCYTES NFR BLD AUTO: 0.3 %
LYMPHOCYTES # BLD AUTO: 1.69 K/UL
LYMPHOCYTES NFR BLD AUTO: 21.3 %
MAN DIFF?: NORMAL
MCHC RBC-ENTMCNC: 27.2 PG
MCHC RBC-ENTMCNC: 31.5 GM/DL
MCV RBC AUTO: 86.2 FL
MONOCYTES # BLD AUTO: 0.45 K/UL
MONOCYTES NFR BLD AUTO: 5.7 %
NEUTROPHILS # BLD AUTO: 5.61 K/UL
NEUTROPHILS NFR BLD AUTO: 70.5 %
PARATHYROID HORMONE INTACT: 30 PG/ML
PHOSPHATE SERPL-MCNC: 3.8 MG/DL
PLATELET # BLD AUTO: 395 K/UL
POTASSIUM SERPL-SCNC: 4.5 MMOL/L
PROT SERPL-MCNC: 6.7 G/DL
RBC # BLD: 4.12 M/UL
RBC # FLD: 13.8 %
SODIUM SERPL-SCNC: 141 MMOL/L
TSH SERPL-ACNC: 1.84 UIU/ML
WBC # FLD AUTO: 7.94 K/UL

## 2023-05-13 ENCOUNTER — EMERGENCY (EMERGENCY)
Facility: HOSPITAL | Age: 58
LOS: 0 days | Discharge: HOME | End: 2023-05-13
Attending: EMERGENCY MEDICINE
Payer: MEDICAID

## 2023-05-13 VITALS
OXYGEN SATURATION: 100 % | TEMPERATURE: 98 F | RESPIRATION RATE: 18 BRPM | HEART RATE: 100 BPM | DIASTOLIC BLOOD PRESSURE: 63 MMHG | SYSTOLIC BLOOD PRESSURE: 112 MMHG

## 2023-05-13 VITALS — WEIGHT: 139.99 LBS | HEIGHT: 70 IN

## 2023-05-13 DIAGNOSIS — Z91.018 ALLERGY TO OTHER FOODS: ICD-10-CM

## 2023-05-13 DIAGNOSIS — Z88.0 ALLERGY STATUS TO PENICILLIN: ICD-10-CM

## 2023-05-13 DIAGNOSIS — Z91.040 LATEX ALLERGY STATUS: ICD-10-CM

## 2023-05-13 DIAGNOSIS — Z98.890 OTHER SPECIFIED POSTPROCEDURAL STATES: Chronic | ICD-10-CM

## 2023-05-13 DIAGNOSIS — M54.9 DORSALGIA, UNSPECIFIED: ICD-10-CM

## 2023-05-13 DIAGNOSIS — M25.539 PAIN IN UNSPECIFIED WRIST: ICD-10-CM

## 2023-05-13 DIAGNOSIS — M25.569 PAIN IN UNSPECIFIED KNEE: ICD-10-CM

## 2023-05-13 DIAGNOSIS — K80.10 CALCULUS OF GALLBLADDER WITH CHRONIC CHOLECYSTITIS WITHOUT OBSTRUCTION: ICD-10-CM

## 2023-05-13 DIAGNOSIS — M06.9 RHEUMATOID ARTHRITIS, UNSPECIFIED: ICD-10-CM

## 2023-05-13 DIAGNOSIS — M79.10 MYALGIA, UNSPECIFIED SITE: ICD-10-CM

## 2023-05-13 DIAGNOSIS — M81.0 AGE-RELATED OSTEOPOROSIS WITHOUT CURRENT PATHOLOGICAL FRACTURE: ICD-10-CM

## 2023-05-13 PROCEDURE — 96372 THER/PROPH/DIAG INJ SC/IM: CPT

## 2023-05-13 PROCEDURE — 99284 EMERGENCY DEPT VISIT MOD MDM: CPT

## 2023-05-13 PROCEDURE — 99283 EMERGENCY DEPT VISIT LOW MDM: CPT | Mod: 25

## 2023-05-13 RX ORDER — DEXAMETHASONE 0.5 MG/5ML
10 ELIXIR ORAL ONCE
Refills: 0 | Status: COMPLETED | OUTPATIENT
Start: 2023-05-13 | End: 2023-05-13

## 2023-05-13 RX ORDER — KETOROLAC TROMETHAMINE 30 MG/ML
60 SYRINGE (ML) INJECTION ONCE
Refills: 0 | Status: DISCONTINUED | OUTPATIENT
Start: 2023-05-13 | End: 2023-05-13

## 2023-05-13 RX ORDER — OXYCODONE HYDROCHLORIDE 5 MG/1
1 TABLET ORAL
Qty: 12 | Refills: 0
Start: 2023-05-13 | End: 2023-05-15

## 2023-05-13 RX ORDER — ONDANSETRON 8 MG/1
1 TABLET, FILM COATED ORAL
Qty: 12 | Refills: 0
Start: 2023-05-13 | End: 2023-05-15

## 2023-05-13 RX ADMIN — Medication 10 MILLIGRAM(S): at 21:22

## 2023-05-13 RX ADMIN — Medication 60 MILLIGRAM(S): at 21:22

## 2023-05-13 NOTE — ED STATDOCS - PROGRESS NOTE DETAILS
PA note: All labwork/radiology results discussed in detail with patient. Patient re-examined and re-evaluated. Patient feels much better at this time. ED evaluation, Diagnosis and management discussed with the patient in detail. Workup results discussed with ED attending, OK to ME home. Close PMD follow up encouraged, aftercare to assist with scheduling appointment ASAP. Strict ED return instructions discussed in detail and patient given the opportunity to ask any questions about their discharge diagnosis and instructions. Patient verbalized understanding. ~ Hari Chung PA-C 58 year old female with PMHx of RA followed by Dr. Diaz, on IV biologics presents to the ED c/o pain over body especially in knees and wrists for the past x3 weeks. No trauma, fever, or travel. Pt took tylenol with no relief. Called Dr. Diaz but didn't receive call back. Pt is unwillingly to take prednisone because she has osteoporosi PA: Patient is a 58 year old female with PMHx of osteoporosis, RA followed by Dr. Diaz, on IV biologics who presents to ED c/o pain over body especially in knees and wrists for the past x3 weeks. No trauma, fever, or travel. Pt took tylenol with no relief. ~Hari Chung PA-C PA note: Patient re-examined and re-evaluated. Patient feels much better at this time. ED evaluation, Diagnosis and management discussed with the patient in detail. Workup results discussed with ED attending, OK to MT home. Close ORTHO/RHEUM MD follow up encouraged, aftercare to assist with scheduling appointment ASAP. Strict ED return instructions discussed in detail and patient given the opportunity to ask any questions about their discharge diagnosis and instructions. Patient verbalized understanding. ~ Hari Chung PA-C

## 2023-05-13 NOTE — ED STATDOCS - PHYSICAL EXAMINATION
Gen:  Well appearning in NAD  Head:  NC/AT  Resp: No distress   Ext: no deformities  Skin: warm and dry as visualized   Knees: knees are tender, right swollen. attending: Gen:  Well appearning in NAD  Head:  NC/AT  Resp: No distress   Ext: no deformities  Skin: warm and dry as visualized   Knees: knees are tender, right swollen.

## 2023-05-13 NOTE — ED ADULT TRIAGE NOTE - CHIEF COMPLAINT QUOTE
ambulatory to triage with complaints of all over body pain first noticed 3 weeks ago, now worsening over past 3 days. pain mostly felt in bilateral shoulders, knees, and right hand. denies injury.

## 2023-05-13 NOTE — ED STATDOCS - NSFOLLOWUPINSTRUCTIONS_ED_ALL_ED_FT
Rheumatoid Arthritis  Rheumatoid arthritis (RA) is a long-term (chronic) disease that causes inflammation in the joints. RA may start slowly. It most often affects the small joints of the hands and feet. Usually, the same joints are affected on both sides of the body. Inflammation from RA can also affect other parts of the body, including the heart, eyes, or lungs.    There is no cure for RA, but medicines can help your symptoms and stop or slow down the progression of the disease.    What are the causes?  RA is an autoimmune disease. When you have an autoimmune disease, your body's defense system (immune system) mistakenly attacks healthy body tissues. The exact cause of RA is not known.    What increases the risk?  The following factors may make you more likely to develop this condition:  Being female.  Having a family history of RA or other autoimmune diseases.  Having a history of smoking.  Being obese.  Having been exposed to pollutants or chemicals.  What are the signs or symptoms?  Symptoms of this condition usually start gradually. They are often worse in the morning. The first symptom may be morning stiffness that lasts longer than 30 minutes.    As RA progresses, symptoms may include:  Pain, stiffness, swelling, warmth, and tenderness in joints on both sides of your body.  Loss of energy.  Loss of appetite.  Weight loss.  Low-grade fever.  Dry eyes and dry mouth.  Firm lumps (rheumatoid nodules) that grow beneath your skin in areas such as your forearm bones near your elbows and on your hands.  Changes in the appearance of joints (deformity) and loss of joint function.  Symptoms of this condition vary from person to person.  Symptoms of RA often come and go.  Sometimes, symptoms get worse for a period of time. These are called flares.  How is this diagnosed?  This condition is diagnosed based on your symptoms, medical history, and a physical exam. You may have X-rays or an MRI to check for the type of joint changes that are caused by RA.    You may also have blood tests to look for:  Proteins (antibodies) that your immune system may make if you have RA. These include rheumatoid factor (RF) and anti-CCP.  When blood tests show these proteins, you are said to have "seropositive RA."  When blood tests do not show these proteins, you may have "seronegative RA."  Inflammation in your blood.  A low number of red blood cells (anemia).  How is this treated?  Two hands showing treated versus untreated rheumatoid arthritis.   The goals of treatment are to relieve pain, reduce inflammation, and slow down or stop joint damage and disability. Treatment may include:  Lifestyle changes. It is important to rest as needed, eat a healthy diet, and exercise.  Medicines. Your health care provider may adjust your medicines every 3 months until treatment goals are reached. Common medicines include:  Pain relievers (analgesics).  Corticosteroids and NSAIDs, such as ibuprofen, to reduce inflammation.  Disease-modifying antirheumatic drugs (DMARDs) to try to slow the course of the disease.  Biologic response modifiers to reduce inflammation and damage.  Physical therapy and occupational therapy.  Surgery, if you have severe joint damage. Joint replacement or fusing of joints may be needed.  Your health care provider will work with you to identify the best treatment option for you based on assessment of the overall disease activity in your body.    Follow these instructions at home:  Managing pain, stiffness, and swelling    A heating pad being used on the affected joint.  If directed, apply heat to the affected area as often as told by your health care provider. Use the heat source that your health care provider recommends, such as a moist heat pack or a heating pad.  Place a towel between your skin and the heat source.  Leave the heat on for 20–30 minutes.  Remove the heat if your skin turns bright red. This is especially important if you are unable to feel pain, heat, or cold. You have a greater risk of getting burned.  Activity    Return to your normal activities as told by your health care provider. Ask your health care provider what activities are safe for you.  Rest when you are having a flare.  Start an exercise program as told by your health care provider. This may include physical therapy exercises to maintain movement and strength in your joints.  General instructions    Take over-the-counter and prescription medicines only as told by your health care provider.  Keep all follow-up visits. This is important.  Where to find more information  American College of Rheumatology: rheumatology.org  Arthritis Foundation: arthritis.org  Contact a health care provider if:  You have a flare-up of RA symptoms.  You have a fever.  You have side effects from your medicines.  Get help right away if:  You have chest pain.  You have trouble breathing.  You quickly develop a hot, painful joint that is more severe than your usual joint aches.  These symptoms may be an emergency. Get help right away. Call 911.  Do not wait to see if the symptoms will go away.  Do not drive yourself to the hospital.  Summary  Rheumatoid arthritis (RA) is a long-term (chronic) disease that causes inflammation in the joints.  RA is an autoimmune disease.  The goals of treatment are to relieve pain, reduce inflammation, and slow down or stop joint damage and disability.  This information is not intended to replace advice given to you by your health care provider. Make sure you discuss any questions you have with your health care provider. No

## 2023-05-13 NOTE — ED STATDOCS - OBJECTIVE STATEMENT
58 year old female with PMHx of RA followed by Dr. Diaz, on IV biologics presents to the ED c/o pain over body especially in knees and wrists for the past x3 weeks. No trauma, fever, or travel. Pt took tylenol with no relief. Called Dr. Diaz but didn't receive call back. Pt is unwillingly to take prednisone because she has osteoporosis.

## 2023-05-13 NOTE — ED ADULT NURSE NOTE - OBJECTIVE STATEMENT
pt c/o of all over body pain first noticed 3 weeks ago, now worsening over past 3 days. pain mostly felt in bilateral shoulders, knees, and right hand. denies injury. Denies cp, sob, n/v

## 2023-05-13 NOTE — ED STATDOCS - PATIENT PORTAL LINK FT
You can access the FollowMyHealth Patient Portal offered by [facility] by registering at the following website: [email or web address]. By joining [employer]’s FollowMyHealth portal, you will also be able to view your health information using other applications (apps) compatible with our system.

## 2023-05-13 NOTE — ED STATDOCS - CLINICAL SUMMARY MEDICAL DECISION MAKING FREE TEXT BOX
Pt with RA now having acute flare, unable to take steroids. Will give Toradol, and narcotic to use at night to be able to sleep. Pt will f/u with Dr. Diaz this week. Pt with RA now having acute flare, unable to take steroids. Will give Toradol, and narcotic to use at night to be able to sleep. Pt will f/u with Dr. Diaz this week.      PA note: All labwork/radiology results discussed in detail with patient. Patient re-examined and re-evaluated. Patient feels much better at this time. ED evaluation, Diagnosis and management discussed with the patient in detail. Workup results discussed with ED attending, OK to dc home. Close PMD follow up encouraged, aftercare to assist with scheduling appointment ASAP. Strict ED return instructions discussed in detail and patient given the opportunity to ask any questions about their discharge diagnosis and instructions. Patient verbalized understanding. ~ Hari Chung PA-C Pt with RA now having acute flare, unable to take steroids. Will give Toradol, and narcotic to use at night to be able to sleep. Pt will f/u with Dr. Diaz this week.      PA note: Patient re-examined and re-evaluated. Patient feels much better at this time. ED evaluation, Diagnosis and management discussed with the patient in detail. Workup results discussed with ED attending, OK to NH home. Close ORTHO/RHEUM MD follow up encouraged, aftercare to assist with scheduling appointment ASAP. Strict ED return instructions discussed in detail and patient given the opportunity to ask any questions about their discharge diagnosis and instructions. Patient verbalized understanding. ~ Hari Chung PA-C

## 2023-05-13 NOTE — ED ADULT NURSE NOTE - NSFALLUNIVINTERV_ED_ALL_ED
Bed/Stretcher in lowest position, wheels locked, appropriate side rails in place/Call bell, personal items and telephone in reach/Instruct patient to call for assistance before getting out of bed/chair/stretcher/Non-slip footwear applied when patient is off stretcher/[place] to call system/Physically safe environment - no spills, clutter or unnecessary equipment/Purposeful proactive rounding/Room/bathroom lighting operational, light cord in reach

## 2023-05-13 NOTE — ED STATDOCS - ATTENDING APP SHARED VISIT CONTRIBUTION OF CARE
Dr. Franco: I performed a face to face bedside interview with patient regarding history of present illness, review of symptoms and past medical history. I completed an independent physical exam.  I have discussed patient's plan of care with PA.   I agree with note as stated above, having amended the EMR as needed to reflect my findings.   This includes HISTORY OF PRESENT ILLNESS, HIV, PAST MEDICAL/SURGICAL/FAMILY/SOCIAL HISTORY, ALLERGIES AND HOME MEDICATIONS, REVIEW OF SYSTEMS, PHYSICAL EXAM, and any PROGRESS NOTES during the time I functioned as the attending physician for this patient.  CHAN Franco DO

## 2023-05-13 NOTE — ED STATDOCS - EYES, MLM
Refused.  Dose was changed and sent to pharmacy.    
clear bilaterally.  Pupils equal, round, and reactive to light.

## 2023-05-13 NOTE — ED STATDOCS - NSICDXPASTMEDICALHX_GEN_ALL_CORE_FT
PAST MEDICAL HISTORY:  Calculus of gallbladder with chronic cholecystitis without obstruction       Osteoporosis     RA (rheumatoid arthritis)

## 2023-05-16 ENCOUNTER — APPOINTMENT (OUTPATIENT)
Dept: RHEUMATOLOGY | Facility: CLINIC | Age: 58
End: 2023-05-16
Payer: MEDICAID

## 2023-05-16 VITALS
DIASTOLIC BLOOD PRESSURE: 70 MMHG | TEMPERATURE: 97.6 F | SYSTOLIC BLOOD PRESSURE: 118 MMHG | WEIGHT: 118 LBS | BODY MASS INDEX: 25.54 KG/M2 | OXYGEN SATURATION: 99 % | HEART RATE: 89 BPM

## 2023-05-16 PROCEDURE — 20610 DRAIN/INJ JOINT/BURSA W/O US: CPT | Mod: RT

## 2023-05-16 PROCEDURE — 96372 THER/PROPH/DIAG INJ SC/IM: CPT | Mod: 59

## 2023-05-16 PROCEDURE — 99215 OFFICE O/P EST HI 40 MIN: CPT | Mod: 25

## 2023-05-16 RX ORDER — PREDNISONE 20 MG/1
20 TABLET ORAL
Qty: 21 | Refills: 1 | Status: DISCONTINUED | COMMUNITY
Start: 2019-09-23 | End: 2023-05-16

## 2023-05-16 RX ORDER — HYDROXYCHLOROQUINE SULFATE 200 MG/1
200 TABLET, FILM COATED ORAL DAILY
Qty: 90 | Refills: 1 | Status: DISCONTINUED | COMMUNITY
Start: 2023-01-25 | End: 2023-05-16

## 2023-05-16 RX ORDER — KETOROLAC TROMETHAMINE 30 MG/ML
30 INJECTION, SOLUTION INTRAMUSCULAR; INTRAVENOUS
Qty: 1 | Refills: 0 | Status: COMPLETED | OUTPATIENT
Start: 2023-05-16

## 2023-05-16 RX ORDER — TRIAMCINOLONE ACETONIDE 80 MG/ML
80 INJECTION, SUSPENSION INTRA-ARTICULAR; INTRAMUSCULAR
Qty: 1 | Refills: 0 | Status: COMPLETED | OUTPATIENT
Start: 2023-05-16

## 2023-05-16 RX ORDER — GOLIMUMAB 50 MG/4ML
50 SOLUTION INTRAVENOUS
Qty: 3 | Refills: 6 | Status: DISCONTINUED | COMMUNITY
Start: 2022-12-01 | End: 2023-05-16

## 2023-05-16 RX ORDER — PREDNISONE 20 MG/1
20 TABLET ORAL
Qty: 7 | Refills: 0 | Status: DISCONTINUED | COMMUNITY
Start: 2023-03-17 | End: 2023-05-16

## 2023-05-16 RX ADMIN — KETOROLAC TROMETHAMINE 0 MG/ML: 30 INJECTION, SOLUTION INTRAMUSCULAR; INTRAVENOUS at 00:00

## 2023-05-16 RX ADMIN — TRIAMCINOLONE ACETONIDE 0 MG/ML: 80 INJECTION, SUSPENSION INTRA-ARTICULAR; INTRAMUSCULAR at 00:00

## 2023-05-17 NOTE — REVIEW OF SYSTEMS
[Loss Of Hearing] : hearing loss [Lower Ext Edema] : lower extremity edema [Arthralgias] : arthralgias [Joint Pain] : joint pain [Joint Swelling] : joint swelling [Joint Stiffness] : joint stiffness [Anxiety] : anxiety [Negative] : Heme/Lymph [Recent Weight Loss (___ Lbs)] : recent [unfilled] ~Ulb weight loss [Dry Eyes] : dryness of the eyes [As Noted in HPI] : as noted in HPI [Sleep Disturbances] : sleep disturbances [Fever] : no fever [Chills] : no chills [Feeling Poorly] : not feeling poorly [Feeling Tired] : not feeling tired [Eye Pain] : no eye pain [Red Eyes] : eyes not red [Eyesight Problems] : no eyesight problems [Chest Pain] : no chest pain [Palpitations] : no palpitations [Shortness Of Breath] : no shortness of breath [Wheezing] : no wheezing [Cough] : no cough [SOB on Exertion] : no shortness of breath during exertion [Abdominal Pain] : no abdominal pain [Vomiting] : no vomiting [Constipation] : no constipation [Diarrhea] : no diarrhea [Skin Lesions] : no skin lesions [Skin Wound] : no skin wound [Itching] : no itching [Confused] : no confusion [Limb Weakness] : no limb weakness [Difficulty Walking] : no difficulty walking [Swollen Glands] : no swollen glands [Swollen Glands In The Neck] : no swollen glands in the neck [FreeTextEntry2] : +weight loss [FreeTextEntry4] : unknown origin of Left sided hearing loss w/ chronic tinnitus- stable  [FreeTextEntry9] : diffuse throughout bl elbows, shoulders, bl knees ankles R>L; no further pain at R GTB,   [de-identified] : no recent psoriasis episodes (only 1  episode previously) and still  [de-identified] : about treatments, pain is waking her up from sleep

## 2023-05-17 NOTE — PROCEDURE
[Today's Date:] : Date: [unfilled] [Risks] : risks [Benefits] : benefits [Alternatives] : alternatives [Consent Obtained] : written consent was obtained prior to the procedure and is detailed in the patient's record [Patient] : Prior to the start of the procedure a time out was taken and the identity of the patient was confirmed via name and date of birth with the patient. The correct site and the procedure to be performed were confirmed. The correct side was confirmed if applicable. The availability of the correct equipment was verified [Therapeutic] : therapeutic [#1 Site: ______] : #1 site identified in the [unfilled] [___ ml Inj] : [unfilled] ~Uml [1%] : 1%  [Betadine] : betadine solution [Alcohol] : alcohol [25 gauge 1.5 inch] : A 25 gauge 1.5 inch needle was used [Depomedrol ___ mg] : Depomedrol [unfilled] mg [Tolerated Well] : the patient tolerated the procedure well [Reports Improvement in Symptoms] : reports improvement in symptoms [No Complications] : there were no complications [Instructions Given] : handouts/patient instructions were given to patient [Patient Instructed to Call] : patient was instructed to call if redness at site, a decrease in range of motion or an increase in pain is noted after procedure. [#2 Site: ___] : # 2 site identified in the [unfilled] [de-identified] : 5/8" 25 g for Toradol  [de-identified] : 30 mg Toradol  [FreeTextEntry1] : 80mg Depomedrol administered into R knee today for active synovitis. Instructed patient to apply ice packs to area today and tomorrow for 20 minutes at a time. Monitor site for any signs of redness, swelling, worsening pain, or fever. Patient tolerated procedure well without any immediate complications. \par - you were given Toradol 30 mg IM today...  Make sure that you are well hydrated and avoid any other NSAIDs today.  \par \par

## 2023-05-17 NOTE — PHYSICAL EXAM
[General Appearance - Alert] : alert [General Appearance - In No Acute Distress] : in no acute distress [General Appearance - Well Nourished] : well nourished [General Appearance - Well Developed] : well developed [General Appearance - Well-Appearing] : healthy appearing [Sclera] : the sclera and conjunctiva were normal [Oropharynx] : the oropharynx was normal [Neck Appearance] : the appearance of the neck was normal [Respiration, Rhythm And Depth] : normal respiratory rhythm and effort [Exaggerated Use Of Accessory Muscles For Inspiration] : no accessory muscle use [Auscultation Breath Sounds / Voice Sounds] : lungs were clear to auscultation bilaterally [Heart Rate And Rhythm] : heart rate was normal and rhythm regular [Heart Sounds] : normal S1 and S2 [Heart Sounds Gallop] : no gallops [Murmurs] : no murmurs [Heart Sounds Pericardial Friction Rub] : no pericardial rub [Edema] : there was no peripheral edema [Cervical Lymph Nodes Enlarged Posterior Bilaterally] : posterior cervical [Cervical Lymph Nodes Enlarged Anterior Bilaterally] : anterior cervical [Supraclavicular Lymph Nodes Enlarged Bilaterally] : supraclavicular [No CVA Tenderness] : no ~M costovertebral angle tenderness [No Spinal Tenderness] : no spinal tenderness [Abnormal Walk] : normal gait [Nail Clubbing] : no clubbing  or cyanosis of the fingernails [Involuntary Movements] : no involuntary movements were seen [Motor Tone] : muscle strength and tone were normal [Skin Color & Pigmentation] : normal skin color and pigmentation [Skin Turgor] : normal skin turgor [] : no rash [Skin Lesions] : no skin lesions [No Focal Deficits] : no focal deficits [Oriented To Time, Place, And Person] : oriented to person, place, and time [Impaired Insight] : insight and judgment were intact [Affect] : the affect was normal [Mood] : the mood was normal [FreeTextEntry1] : Unclear understanding of medical information, compliance fair over past year

## 2023-05-17 NOTE — HISTORY OF PRESENT ILLNESS
[FreeTextEntry1] : 5/16/2023\par \par -Worsening pain since last visit 4/7/2023 to bl elbows, bl shoulders, bl knees, and R ankle, pain wakes patient up from sleep and difficult to get out of bed. Went to [facility] 3 weeks ago and since her pain has been terrible and has had swelling to R knee/R ankle. Did not take MTX to [facility], missed two doses\par -When taking MTX, tolerates it well, has only missed two doses, actually tolerated 15 mg without issue in past \par -Has not been taking HCQ - didn't know she needed to keep taking with MTX\par -c/o L arm/elbow pain, endorsing N/T, decreased ROM. No falls/trauma. Started after getting injections in both arms. Wakes up in pain \par -Went to hospital 5 days ago 5/12 with c/o R Knee and bl shoulder pain, got a steroid injection. Since then has felt better but today pain is back to bl shoulders, bl elbows, bl knees. Has taken two advil for this but rating pain 8/10\par -Has not taken any prenisone for pain \par - labs 5/11/23 all active RA \par -Next simponi aria infusion 5/24 - hasn't missed any infusion appts\par \par ROS:\par +losing weight, 10lbs in one month, +HA, +dry eyes/dry mouth, +Hair loss\par \par Denies fever, chills, rash, sob, chest pain, palpitations, abdominal pain, n/v/d.\par \par \par HM:\par - up to date on DEXA  1/19/23 w/ most severe T score -2.5... no comparison (different machine) but previously -2.3 at this level... FN -1.9 previously 0.8 and TH -1.4 previously 0.7.  No previous treatment or known fractures. ..Need to start treatment for Osteoporosis but need better control of RA first \par \par \par 1) Inflammatory polyarthropathy:  CCP + RA (newly dx 3/22) \par 2) Osteoporosis \par ______________________________________________________________________________ \par \par (Initial HPI  3/2/22) \par 58 yo female coming in for consult for long-standing hx of polyarthropathy. \par \par - 2 years of joint pain and swelling, worsens with raining involving R wrist, R elbow, MCPs bilateral R > L, bilateral shoulders, bilateral knees, R ankle. \par - Morning stiffness for 10 minutes approx. \par - + Back pain worse in the AM, lower. Feels better in 5 minutes. \par - Takes OTC Tylenol/Motrin/Aleve with minimal improvement. \par - Ankle swelling has been assessed with XR, MRI - reportedly nothing remarkable. \par - Plantar fasciitis bilateral dx, has tried steroid injection in bilateral feet with some improvement, but couldn't pursue this option long-term. \par - Dx with arthritis, [place] - labs done recently - was told arthritis might be of rheum. origin\par - Dx with psoriasis a few years ago, hydrocortisone cream improved rash, was on left anterior ankle/distal leg   \par \par - Hx of hearing loss (not complete) in left ear - unknown origin. (took high dose of steroids without improvement in the past)\par  - Denies trigger finger, swelling in fingers, toe swelling, vision changes, diarrhea, abd pain, red eyes, night sweats, lymphadenopathy, chest pain, SOB, pleurisy. \par \par Social: \par - P 3 G 3 (oldest 35 y.o), , lives alone. \par - Non-smoker, social drinker \par \par FH + history of arthritis (aunt), no family hx of psoriasis

## 2023-05-17 NOTE — END OF VISIT
[Time Spent: ___ minutes] : I have spent [unfilled] minutes of time on the encounter. [FreeTextEntry3] : Patient was seen and evaluated by Lore Womack- MYNOR fellow and with MATILDE Diaz I agree with full evaluation and plan as described above

## 2023-05-17 NOTE — ASSESSMENT
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 with recurrent plantar fascitis and asymmetrical presentation and possible psoriasis - patch on L ankle/ possible scalp lesion.. but CCP > 250 w/ Vectra 12/22 of 52 excellent and full response to pred at 20 mg daily.. feels great on high dose steroids. Eventually started on Simponi Aria 1/23- but despite loading dose w/ steroids, continues active synovitis still. Added  mg (200 mg is 3.5 mg/kg for 57 kilograms) well tolerated but not effective... Started low dose MTX (4 pills/week) for persistent \par synovitis in R ankle fullness, R plantar fasciites, cervical pain and R sided GH pain and feeling poorly within 2 wks of infusion... now will go up to 20 mg once a week with goal to transition to SubQ Pen \par For immediate relief, given  80 mg depo to R knee and 30mg Toradol today in office... given active synovitis still and significant elevation in ESR (86) CRP (46), 10# weight loss, need to try a new BMARD since Simponi Aria has been primary failure.. will change MOA.. consider ORencia.. no contraindication at this time.  \par -Could try Actemra but need to confirm no h/o diverticulitis or PUD\par -Could also try Orencia..  \par NO active psoriasis now...  and still \par NOTE: \par Baseline Hand/ SI joint xrays 3/22 negative for erosions, no deformities / and neg HLAB27\par Initial presentation + Achilles tendonitis + plantar fasciitis, R wrist and MCP/ MTP inflammation\par -Simponi Aria infusion 1/2023-5/2023 with no control in synovitis --> will not trial any other TNFi or biosimiliars \par -HCQ 1/2023-4/2023 well tolerated but stopped taking and did not control synovitis \par -MTX up to 15 mg well tolerate \par \par 2) Compliance:  still unclear language barrier, reports understanding and speaks good English but following directions not always consistent.  Concerned about degree of active joint inflammation and repeated use of steroid for control.   Ideally with infusion tx should be better, RN team for support... reports compliance with addition of HCQ- again stopped and missed doses of MTX.. literature again provided in Finnish on RA, MTX and ORencia\par \par 3) Osteoporosis:  DEXA  1/19/23 w/ most severe T score -2.5... no comparison (different machine) but previously -2.3 at this level... FN -1.9 previously 0.8 and TH -1.4 previously 0.7.  No previous treatment or known fractures.\par RIsks:  long term steroids > 3 m (off now), age, RA\par -PCP suggested Prolia, only mild osteoporosis, guidelines suggest starting with BP.. , could be a candidate for biphosphate but if compliance or language barrier a concern, could do Reclast \par \par 4) Unintentional wt loss:  concerned about 10 lb loss over past few mnths, disease poorly controlled. \par \par \par Plan:\par \par -Administered 80mg of Depomedrol into R knee and administered 30mg Toradol into R arm \par \par -Drink plenty of fluids today and tomorrow \par \par -NO advil, motrin, Naproxen, Ibuprofen for next 24 hours, can start taking tomorrow if you need it \par \par -Ice R knee where you get home \par \par -Stay off of work for 24-48 hours this week \par \par -Take 8 tablets of Methotrexate once a week - we are working on getting this medication into an injectable form, due to hand pain/stiffness patient unable to effectively draw up medication into syringe, need Rasuvo Auto Injector Pen.  Will need appt with RN team to demonstrate injection if approved \par \par -Take 1-3 tablets of Folic Acid every day except on the days you take Methotrexate\par \par -Take 2 tablets of Prednisone every day (10mg total) until you come see us in a month \par \par -Labs in two weeks \par \par -Continue with Simponi Aria Infusions until we have a better plan - maybe Orencia or Actemra \par \par - stop Plaquenil .\par \par - Will  need to do labs every month for the first 6 months initially with MTX \par - let us know if you have any problems (nausea, hair loss, mouth sores).  Ideally these will be minimized by....also taking.. You should tolerate this low dose with NO side effects.  \par  \par - Call if you develop any infections (you may need to hold MTX based on need for antibiotics)\par \par - Also: hold MTX for 1-2 wk for any vaccines.  \par \par - continue with your infusions of Simponi Aria - - Continue loading dose Simponii Aria with steroids, once in remission will stop Solu-Medrol \par \par - will need to discuss tx for OP but too many issues now, would like to get RA controlled first.  Would recommend BP as initial tx in this setting.. See above \par \par -RTO 1 monthly to improve compliance and therefore RA control ideally \par \par \par

## 2023-05-17 NOTE — CONSULT LETTER
[Dear  ___] : Dear  [unfilled], [Consult Letter:] : I had the pleasure of evaluating your patient, [unfilled]. [Consult Closing:] : Thank you very much for allowing me to participate in the care of this patient.  If you have any questions, please do not hesitate to contact me. [Sincerely,] : Sincerely, [DrNilay  ___] : Dr. COLÓN [DrNilay ___] : Dr. 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 with recurrent plantar fascitis and asymmetrical presentation and possible psoriasis - patch on L ankle/ possible scalp lesion.. but CCP > 250 w/ Vectra 12/22 of 52 excellent and full response to pred at 20 mg daily.. feels great, not this good in many years. Eventually started on Simponi Aria 1/23- but despite loading dose w/ steroids, continues active synovitis. Added  mg (200 mg is 3.5 mg/kg for 57 kilograms) well tolerated but not effective as yet.. now open to starting low dose MTX (4 pills/week) for persistent \par synovitis in R ankle fullness, R plantar fasciites, cervical pain and R sided GH pain and feeling poorly within 2 wks of infusion... \par For immediate relief, given  40 mg depo and 30mg Toradol given now... repeat Simponi infusion scheduled 1 month.. Hopeful once she starts MTX that swelling and pain will improve  \par Need to confirm opth eval...referral given today \par NO active psoriasis now...  \par Labs stable on current dose, need repeat bloodwork now, patient aware and will go to lab \par NOTE: \par Baseline Hand/ SI joint xrays 3/22 negative for erosions, no deformities / and neg HLAB27\par Initial presentation + Achilles tendonitis + plantar fasciitis, R wrist and MCP/ MTP inflammation\par \par 2) Compliance:  still unclear language barrier, reports understanding and speaks good English but following directions not always consistent.  Concerned about degree of active joint inflammation and repeated use of steroid for control.   Ideally with infusion tx should be better, RN team for support... reports compliance with addition of HCQ\par \par 3) Osteoporosis:  DEXA  1/19/23 w/ most severe T score -2.5... no comparison (different machine) but previously -2.3 at this level... FN -1.9 previously 0.8 and TH -1.4 previously 0.7.  No previous treatment or known fractures.\par RIsks:  long term steroids > 3 m (off now), age, RA\par -PCP suggested Prolia, only mild osteoporosis, guidelines suggest starting with BP.. , could be a candidate for biphosphate but if compliance or language barrier a concern, could do Reclast \par \par \par Plan:\par - administered 40 mg of Depo-Medrol and IM Toradol 30 mg today . \par \par - continue w/Plaquenil 200 mg .....Do NOT stop this medication.  You do need to let your opthalmology doctor know that you have started this medicine and you need to be seen within the next 6 months ...\par \par - Start MTX (methotrexate) at 4 tabs (10 mg)  po wk taken all together at the same time.  \par Most people tolerate best at night before bed and in middle of wk\par Do labs in 2 wks after starting \par Will then need to do labs every month for the first 6 months.  \par - let us know if you have any problems (nausea, hair loss, mouth sores).  Ideally these will be minimized by....also taking.. You should tolerate this low dose with NO side effects.  \par \par - Take Folic acid 1-3 mg daily on all the days you don't take MTX.  Start at 1 mg and increase in having any SE. \par \par - Call if you develop any infections (you may need to hold MTX based on need for antibiotics)\par \par - Also: hold MTX for 1-2 wk for any vaccines.  \par \par - continue with your infusions of Simponi Aria - - Continue loading dose Simponii Aria with steroids, once in remission will stop Solu-Medrol \par \par - will need to discuss tx for OP but too many issues now, would like to get RA controlled first.  Would recommend BP as initial tx in this setting.. See above \par \par -RTO 4-6weeks\par \par \par  [FreeTextEntry3] : Sheela Diaz DNP, ANP-C\par Division or Rheumatology\par [facility]\par \par

## 2023-05-30 LAB
ALBUMIN SERPL ELPH-MCNC: 4 G/DL
ALP BLD-CCNC: 74 U/L
ALT SERPL-CCNC: 14 U/L
ANION GAP SERPL CALC-SCNC: 10 MMOL/L
AST SERPL-CCNC: 13 U/L
BILIRUB SERPL-MCNC: 0.8 MG/DL
BUN SERPL-MCNC: 13 MG/DL
CALCIUM SERPL-MCNC: 9.7 MG/DL
CHLORIDE SERPL-SCNC: 104 MMOL/L
CO2 SERPL-SCNC: 23 MMOL/L
CREAT SERPL-MCNC: 0.66 MG/DL
CRP SERPL-MCNC: 17 MG/L
EGFR: 102 ML/MIN/1.73M2
GLUCOSE SERPL-MCNC: 92 MG/DL
POTASSIUM SERPL-SCNC: 4 MMOL/L
PROT SERPL-MCNC: 6.7 G/DL
SODIUM SERPL-SCNC: 138 MMOL/L

## 2023-05-31 ENCOUNTER — APPOINTMENT (OUTPATIENT)
Dept: RHEUMATOLOGY | Facility: CLINIC | Age: 58
End: 2023-05-31

## 2023-06-06 ENCOUNTER — NON-APPOINTMENT (OUTPATIENT)
Age: 58
End: 2023-06-06

## 2023-06-06 ENCOUNTER — APPOINTMENT (OUTPATIENT)
Dept: RHEUMATOLOGY | Facility: CLINIC | Age: 58
End: 2023-06-06
Payer: MEDICAID

## 2023-06-06 VITALS
BODY MASS INDEX: 26.97 KG/M2 | DIASTOLIC BLOOD PRESSURE: 60 MMHG | TEMPERATURE: 97.7 F | SYSTOLIC BLOOD PRESSURE: 112 MMHG | OXYGEN SATURATION: 98 % | HEART RATE: 92 BPM | WEIGHT: 125 LBS | HEIGHT: 57 IN

## 2023-06-06 PROCEDURE — 99215 OFFICE O/P EST HI 40 MIN: CPT | Mod: 25

## 2023-06-06 PROCEDURE — T1013A: CUSTOM

## 2023-06-06 PROCEDURE — 96372 THER/PROPH/DIAG INJ SC/IM: CPT | Mod: 1L

## 2023-06-06 RX ADMIN — METHYLPREDNISOLONE ACETATE 0 MG/ML: 80 INJECTION, SUSPENSION INTRA-ARTICULAR; INTRALESIONAL; INTRAMUSCULAR; SOFT TISSUE at 00:00

## 2023-06-07 LAB — ERYTHROCYTE [SEDIMENTATION RATE] IN BLOOD BY WESTERGREN METHOD: 60 MM/HR

## 2023-06-12 ENCOUNTER — NON-APPOINTMENT (OUTPATIENT)
Age: 58
End: 2023-06-12

## 2023-06-12 ENCOUNTER — APPOINTMENT (OUTPATIENT)
Dept: RHEUMATOLOGY | Facility: CLINIC | Age: 58
End: 2023-06-12

## 2023-06-12 PROBLEM — M06.9 RHEUMATOID ARTHRITIS, UNSPECIFIED: Chronic | Status: ACTIVE | Noted: 2023-05-18

## 2023-06-12 PROBLEM — M81.0 AGE-RELATED OSTEOPOROSIS WITHOUT CURRENT PATHOLOGICAL FRACTURE: Chronic | Status: ACTIVE | Noted: 2023-05-18

## 2023-06-19 ENCOUNTER — APPOINTMENT (OUTPATIENT)
Dept: RHEUMATOLOGY | Facility: CLINIC | Age: 58
End: 2023-06-19
Payer: MEDICAID

## 2023-06-19 VITALS
SYSTOLIC BLOOD PRESSURE: 114 MMHG | RESPIRATION RATE: 18 BRPM | OXYGEN SATURATION: 98 % | HEART RATE: 71 BPM | DIASTOLIC BLOOD PRESSURE: 69 MMHG

## 2023-06-19 VITALS
OXYGEN SATURATION: 96 % | DIASTOLIC BLOOD PRESSURE: 64 MMHG | RESPIRATION RATE: 18 BRPM | SYSTOLIC BLOOD PRESSURE: 108 MMHG | HEART RATE: 85 BPM | TEMPERATURE: 97.3 F

## 2023-06-19 PROCEDURE — 96413 CHEMO IV INFUSION 1 HR: CPT

## 2023-06-19 PROCEDURE — 96374 THER/PROPH/DIAG INJ IV PUSH: CPT | Mod: 59

## 2023-06-19 RX ORDER — METHYLPRED ACET/NACL,ISO-OS/PF 40 MG/ML
40 VIAL (ML) INJECTION
Qty: 1 | Refills: 0 | Status: COMPLETED
Start: 2023-06-08

## 2023-06-19 RX ORDER — METHYLPRED ACET/NACL,ISO-OS/PF 80 MG/ML
80 VIAL (ML) INJECTION
Qty: 1 | Refills: 0 | Status: COMPLETED | OUTPATIENT
Start: 2023-06-06

## 2023-06-19 RX ORDER — GOLIMUMAB 50 MG/4ML
50 SOLUTION INTRAVENOUS
Qty: 0 | Refills: 0 | Status: COMPLETED | OUTPATIENT
Start: 2023-06-08

## 2023-06-19 RX ORDER — CETIRIZINE HYDROCHLORIDE 10 MG/1
10 TABLET, COATED ORAL
Qty: 0 | Refills: 0 | Status: COMPLETED | OUTPATIENT
Start: 2023-06-08

## 2023-06-19 NOTE — HISTORY OF PRESENT ILLNESS
[8] : 8 [Denies] : Denies [No] : No [Yes] : Yes [Declined] : Declined [TB] : Tuberculosis screening [Hep acute panel] : Hepatitis acute panel [Left upper extremity] : Left upper extremity [22g] : 22g [Medication Name: ___] : Medication Name: [unfilled] [Start Time: ___] : Medication Start Time: [unfilled] [End Time: ___] : Medication End Time: [unfilled] [IV discontinued. Intact. No signs or symptoms of IV complications noted. Time: ___] : IV discontinued. Intact. No signs or symptoms of IV complications noted. Time: [unfilled] [Patient  instructed to seek medical attention with signs and symptoms of adverse effects] : Patient  instructed to seek medical attention with signs and symptoms of adverse effects [Patient left unit in no acute distress] : Patient left unit in no acute distress [Medications administered as ordered and tolerated well.] : Medications administered as ordered and tolerated well. [de-identified] : Reports pain and stiffness  to neck, right wrist and knees bilaterally.  [de-identified] : 03:00 pm [de-identified] : Lab requisition givne to patient and patient instructed to get labs draws as per DNP orders.

## 2023-07-11 ENCOUNTER — APPOINTMENT (OUTPATIENT)
Dept: RHEUMATOLOGY | Facility: CLINIC | Age: 58
End: 2023-07-11

## 2023-07-17 LAB
ALBUMIN SERPL ELPH-MCNC: 3.9 G/DL
ALP BLD-CCNC: 79 U/L
ALT SERPL-CCNC: 25 U/L
ANION GAP SERPL CALC-SCNC: 10 MMOL/L
AST SERPL-CCNC: 16 U/L
BILIRUB SERPL-MCNC: 0.6 MG/DL
BUN SERPL-MCNC: 21 MG/DL
CALCIUM SERPL-MCNC: 9.3 MG/DL
CHLORIDE SERPL-SCNC: 109 MMOL/L
CO2 SERPL-SCNC: 24 MMOL/L
CREAT SERPL-MCNC: 0.75 MG/DL
CRP SERPL-MCNC: <3 MG/L
EGFR: 92 ML/MIN/1.73M2
ERYTHROCYTE [SEDIMENTATION RATE] IN BLOOD BY WESTERGREN METHOD: 65 MM/HR
GLUCOSE SERPL-MCNC: 93 MG/DL
POTASSIUM SERPL-SCNC: 4.4 MMOL/L
PROT SERPL-MCNC: 6.6 G/DL
SODIUM SERPL-SCNC: 142 MMOL/L

## 2023-07-19 RX ORDER — CETIRIZINE HYDROCHLORIDE 10 MG/1
10 TABLET, COATED ORAL
Qty: 0 | Refills: 0 | Status: COMPLETED | OUTPATIENT
Start: 2023-06-08 | End: 1900-01-01

## 2023-07-19 RX ORDER — METHOTREXATE 15 MG/.3ML
15 INJECTION, SOLUTION SUBCUTANEOUS
Qty: 3 | Refills: 3 | Status: DISCONTINUED | COMMUNITY
Start: 2023-05-16 | End: 2023-07-19

## 2023-07-19 RX ORDER — METHYLPRED ACET/NACL,ISO-OS/PF 40 MG/ML
40 VIAL (ML) INJECTION
Qty: 1 | Refills: 0 | Status: COMPLETED | OUTPATIENT
Start: 2023-06-08 | End: 1900-01-01

## 2023-07-19 RX ORDER — GOLIMUMAB 50 MG/4ML
50 SOLUTION INTRAVENOUS
Qty: 0 | Refills: 0 | Status: DISCONTINUED | OUTPATIENT
Start: 2023-06-08 | End: 2023-07-19

## 2023-07-23 ENCOUNTER — EMERGENCY (EMERGENCY)
Facility: HOSPITAL | Age: 58
LOS: 0 days | Discharge: HOME | End: 2023-07-23
Attending: STUDENT IN AN ORGANIZED HEALTH CARE EDUCATION/TRAINING PROGRAM
Payer: MEDICAID

## 2023-07-23 VITALS
RESPIRATION RATE: 18 BRPM | DIASTOLIC BLOOD PRESSURE: 76 MMHG | SYSTOLIC BLOOD PRESSURE: 117 MMHG | HEART RATE: 73 BPM | TEMPERATURE: 98 F | OXYGEN SATURATION: 97 %

## 2023-07-23 VITALS — WEIGHT: 123.9 LBS | HEIGHT: 57 IN

## 2023-07-23 DIAGNOSIS — Z98.890 OTHER SPECIFIED POSTPROCEDURAL STATES: Chronic | ICD-10-CM

## 2023-07-23 DIAGNOSIS — M25.512 PAIN IN LEFT SHOULDER: ICD-10-CM

## 2023-07-23 DIAGNOSIS — M54.2 CERVICALGIA: ICD-10-CM

## 2023-07-23 DIAGNOSIS — M06.9 RHEUMATOID ARTHRITIS, UNSPECIFIED: ICD-10-CM

## 2023-07-23 DIAGNOSIS — K80.20 CALCULUS OF GALLBLADDER WITHOUT CHOLECYSTITIS WITHOUT OBSTRUCTION: ICD-10-CM

## 2023-07-23 DIAGNOSIS — Z91.040 LATEX ALLERGY STATUS: ICD-10-CM

## 2023-07-23 DIAGNOSIS — Z91.018 ALLERGY TO OTHER FOODS: ICD-10-CM

## 2023-07-23 DIAGNOSIS — Z88.0 ALLERGY STATUS TO PENICILLIN: ICD-10-CM

## 2023-07-23 DIAGNOSIS — M81.0 AGE-RELATED OSTEOPOROSIS WITHOUT CURRENT PATHOLOGICAL FRACTURE: ICD-10-CM

## 2023-07-23 PROCEDURE — 93010 ELECTROCARDIOGRAM REPORT: CPT

## 2023-07-23 PROCEDURE — 99283 EMERGENCY DEPT VISIT LOW MDM: CPT | Mod: 25

## 2023-07-23 PROCEDURE — 96372 THER/PROPH/DIAG INJ SC/IM: CPT

## 2023-07-23 PROCEDURE — 99284 EMERGENCY DEPT VISIT MOD MDM: CPT

## 2023-07-23 PROCEDURE — 93005 ELECTROCARDIOGRAM TRACING: CPT

## 2023-07-23 RX ORDER — CYCLOBENZAPRINE HYDROCHLORIDE 10 MG/1
1 TABLET, FILM COATED ORAL
Qty: 10 | Refills: 0
Start: 2023-07-23

## 2023-07-23 RX ORDER — CYCLOBENZAPRINE HYDROCHLORIDE 10 MG/1
5 TABLET, FILM COATED ORAL ONCE
Refills: 0 | Status: COMPLETED | OUTPATIENT
Start: 2023-07-23 | End: 2023-07-23

## 2023-07-23 RX ORDER — LIDOCAINE 4 G/100G
1 CREAM TOPICAL ONCE
Refills: 0 | Status: COMPLETED | OUTPATIENT
Start: 2023-07-23 | End: 2023-07-23

## 2023-07-23 RX ORDER — KETOROLAC TROMETHAMINE 30 MG/ML
15 SYRINGE (ML) INJECTION ONCE
Refills: 0 | Status: DISCONTINUED | OUTPATIENT
Start: 2023-07-23 | End: 2023-07-23

## 2023-07-23 RX ORDER — LIDOCAINE 4 G/100G
1 CREAM TOPICAL
Qty: 1 | Refills: 0
Start: 2023-07-23 | End: 2023-07-27

## 2023-07-23 RX ADMIN — CYCLOBENZAPRINE HYDROCHLORIDE 5 MILLIGRAM(S): 10 TABLET, FILM COATED ORAL at 22:49

## 2023-07-23 RX ADMIN — Medication 15 MILLIGRAM(S): at 22:49

## 2023-07-23 RX ADMIN — LIDOCAINE 1 PATCH: 4 CREAM TOPICAL at 22:50

## 2023-07-23 NOTE — ED STATDOCS - NSFOLLOWUPINSTRUCTIONS_ED_ALL_ED_FT
Cervical Sprain  A cervical sprain is a stretch or tear in one or more of the ligaments in the neck. Ligaments are the tissues that connect bones. Cervical sprains can range from mild to severe. Severe cervical sprains can cause the spinal bones (vertebrae) in the neck to be unstable. This can result in spinal cord damage and in serious nervous system problems.    The time that it takes for a cervical sprain to heal depends on the cause and extent of the injury. Most cervical sprains heal in 4–6 weeks.    What are the causes?  Cervical sprains may be caused by trauma, such as an injury from a motor vehicle accident, a fall, or a sudden forward and backward whipping movement of the head and neck (whiplash injury). Mild cervical sprains may be caused by wear and tear over time.    What increases the risk?  The following factors may make you more likely to develop this condition:  Participating in activities that have a high risk of trauma to the neck. These include contact sports, auto racing, gymnastics, and diving.  Taking risks when driving or riding in a motor vehicle.  Osteoarthritis of the spine.  Poor strength and flexibility of the neck.  A previous neck injury.  Poor posture.  Spending long periods in certain positions that put stress on the neck, such as sitting at a computer for a long time.  What are the signs or symptoms?  Symptoms of this condition include:  Pain, soreness, stiffness, tenderness, swelling, or a burning sensation in the front, back, or sides of the neck, shoulders, or upper back.  Sudden tightening of neck muscles (spasms).  Limited ability to move the neck.  Headache.  Dizziness.  Nausea or vomiting.  Weakness, numbness, or tingling in a hand or an arm.  Symptoms may develop right away after injury, or they may develop over a few days. In some cases, symptoms may go away with treatment and return (recur) over time.    How is this diagnosed?  This condition may be diagnosed based on:  Your medical history.  Your symptoms.  Any recent injuries or known neck problems that you have, such as arthritis in the neck.  A physical exam.  Imaging tests, such as X-rays, MRI, and CT scan.  How is this treated?  This condition is treated by resting and icing the injured area and doing physical therapy exercises. Heat therapy may be used 2–3 days after the injury occurred if there is no swelling. Depending on the severity of your condition, treatment may also include:  Keeping your neck in place (immobilized) for periods of time. This may be done using:  A cervical collar. This supports your chin and the back of your head.  A cervical traction device. This is a sling that holds up your head. The device removes weight and pressure from your neck, and it may help to relieve pain.  Medicines that help to relieve pain and inflammation.  Medicines that help to relax your muscles (muscle relaxants).  Surgery. This is rare.  Follow these instructions at home:  Medicines      Take over-the-counter and prescription medicines only as told by your health care provider.  Ask your health care provider if the medicine prescribed to you:  Requires you to avoid driving or using heavy machinery.  Can cause constipation. You may need to take these actions to prevent or treat constipation:  Drink enough fluid to keep your urine pale yellow.  Take over-the-counter or prescription medicines.  Eat foods that are high in fiber, such as beans, whole grains, and fresh fruits and vegetables.  Limit foods that are high in fat and processed sugars, such as fried or sweet foods.  If you have a cervical collar:    Wear the collar as told by your health care provider. Do not remove it unless told.  Ask before making any adjustments to your collar.  If you have long hair, keep it outside of the collar.  Ask your health care provider if you may remove the collar for cleaning and bathing. If so:  Follow instructions about how to remove it safely.  Clean it by hand with mild soap and water and air-dry it completely.  If your collar has removable pads, remove them every 1–2 days and wash them by hand with soap and water. Let them air-dry completely before putting them back in the collar.  Tell your health care provider if your skin under the collar has irritation or sores.  Managing pain, stiffness, and swelling        If directed, use a cervical traction device as told.  If directed, put ice on the affected area. To do this:  Put ice in a plastic bag.  Place a towel between your skin and the bag.  Leave the ice on for 20 minutes, 2–3 times a day.  If directed, apply heat to the affected area before you do your physical therapy or as often as told by your health care provider. Use the heat source that your health care provider recommends, such as a moist heat pack or a heating pad.  Place a towel between your skin and the heat source.  Leave the heat on for 20–30 minutes.  Remove the heat if your skin turns bright red. This is especially important if you are unable to feel pain, heat, or cold. You may have a greater risk of getting burned.  Activity    Do not drive while wearing a cervical collar. If you do not have a cervical collar, ask if it is safe to drive while your neck heals.  Do not lift anything that is heavier than 10 lb (4.5 kg), or the limit that you are told, until your health care provider says that it is safe.  Rest as told by your health care provider.  If physical therapy was prescribed, do exercises as told by your health care provider or physical therapist.  Return to your normal activities as told by your health care provider. Avoid positions and activities that make your symptoms worse. Ask your health care provider what activities are safe for you.  General instructions    Do not use any products that contain nicotine or tobacco, such as cigarettes, e-cigarettes, and chewing tobacco. These can delay healing. If you need help quitting, ask your health care provider.  Keep all follow-up visits as told by your health care provider or physical therapist. This is important.  How is this prevented?  To prevent a cervical sprain from happening again:  Use and maintain good posture. Make any needed adjustments to your workstation to help you do this.  Exercise regularly as told by your health care provider or physical therapist.  Avoid risky activities that may cause a cervical sprain.  Contact a health care provider if you have:  Symptoms that get worse or do not get better after 2 weeks of treatment.  Pain that gets worse or does not get better with medicine.  New, unexplained symptoms.  Sores or irritated skin on your neck from wearing your cervical collar.  Get help right away if:  You have severe pain.  You develop numbness, tingling, or weakness in any part of your body.  You cannot move a part of your body (you have paralysis).  You have neck pain along with severe dizziness or headache.  Summary  A cervical sprain is a stretch or tear in one or more of the ligaments in the neck.  Cervical sprains may be caused by trauma, such as an injury from a motor vehicle accident, a fall, or a sudden forward and backward whipping movement of the head and neck (whiplash injury).  Symptoms may develop right away after injury, or they may develop over a few days.  This condition may be treated with rest, ice, heat, medicines, physical therapy, and surgery.  This information is not intended to replace advice given to you by your health care provider. Make sure you discuss any questions you have with your health care provider. You had TORADOL here.    Cervical Sprain  A cervical sprain is a stretch or tear in one or more of the ligaments in the neck. Ligaments are the tissues that connect bones. Cervical sprains can range from mild to severe. Severe cervical sprains can cause the spinal bones (vertebrae) in the neck to be unstable. This can result in spinal cord damage and in serious nervous system problems.    The time that it takes for a cervical sprain to heal depends on the cause and extent of the injury. Most cervical sprains heal in 4–6 weeks.    What are the causes?  Cervical sprains may be caused by trauma, such as an injury from a motor vehicle accident, a fall, or a sudden forward and backward whipping movement of the head and neck (whiplash injury). Mild cervical sprains may be caused by wear and tear over time.    What increases the risk?  The following factors may make you more likely to develop this condition:  Participating in activities that have a high risk of trauma to the neck. These include contact sports, auto racing, gymnastics, and diving.  Taking risks when driving or riding in a motor vehicle.  Osteoarthritis of the spine.  Poor strength and flexibility of the neck.  A previous neck injury.  Poor posture.  Spending long periods in certain positions that put stress on the neck, such as sitting at a computer for a long time.  What are the signs or symptoms?  Symptoms of this condition include:  Pain, soreness, stiffness, tenderness, swelling, or a burning sensation in the front, back, or sides of the neck, shoulders, or upper back.  Sudden tightening of neck muscles (spasms).  Limited ability to move the neck.  Headache.  Dizziness.  Nausea or vomiting.  Weakness, numbness, or tingling in a hand or an arm.  Symptoms may develop right away after injury, or they may develop over a few days. In some cases, symptoms may go away with treatment and return (recur) over time.    How is this diagnosed?  This condition may be diagnosed based on:  Your medical history.  Your symptoms.  Any recent injuries or known neck problems that you have, such as arthritis in the neck.  A physical exam.  Imaging tests, such as X-rays, MRI, and CT scan.  How is this treated?  This condition is treated by resting and icing the injured area and doing physical therapy exercises. Heat therapy may be used 2–3 days after the injury occurred if there is no swelling. Depending on the severity of your condition, treatment may also include:  Keeping your neck in place (immobilized) for periods of time. This may be done using:  A cervical collar. This supports your chin and the back of your head.  A cervical traction device. This is a sling that holds up your head. The device removes weight and pressure from your neck, and it may help to relieve pain.  Medicines that help to relieve pain and inflammation.  Medicines that help to relax your muscles (muscle relaxants).  Surgery. This is rare.  Follow these instructions at home:  Medicines      Take over-the-counter and prescription medicines only as told by your health care provider.  Ask your health care provider if the medicine prescribed to you:  Requires you to avoid driving or using heavy machinery.  Can cause constipation. You may need to take these actions to prevent or treat constipation:  Drink enough fluid to keep your urine pale yellow.  Take over-the-counter or prescription medicines.  Eat foods that are high in fiber, such as beans, whole grains, and fresh fruits and vegetables.  Limit foods that are high in fat and processed sugars, such as fried or sweet foods.  If you have a cervical collar:    Wear the collar as told by your health care provider. Do not remove it unless told.  Ask before making any adjustments to your collar.  If you have long hair, keep it outside of the collar.  Ask your health care provider if you may remove the collar for cleaning and bathing. If so:  Follow instructions about how to remove it safely.  Clean it by hand with mild soap and water and air-dry it completely.  If your collar has removable pads, remove them every 1–2 days and wash them by hand with soap and water. Let them air-dry completely before putting them back in the collar.  Tell your health care provider if your skin under the collar has irritation or sores.  Managing pain, stiffness, and swelling        If directed, use a cervical traction device as told.  If directed, put ice on the affected area. To do this:  Put ice in a plastic bag.  Place a towel between your skin and the bag.  Leave the ice on for 20 minutes, 2–3 times a day.  If directed, apply heat to the affected area before you do your physical therapy or as often as told by your health care provider. Use the heat source that your health care provider recommends, such as a moist heat pack or a heating pad.  Place a towel between your skin and the heat source.  Leave the heat on for 20–30 minutes.  Remove the heat if your skin turns bright red. This is especially important if you are unable to feel pain, heat, or cold. You may have a greater risk of getting burned.  Activity    Do not drive while wearing a cervical collar. If you do not have a cervical collar, ask if it is safe to drive while your neck heals.  Do not lift anything that is heavier than 10 lb (4.5 kg), or the limit that you are told, until your health care provider says that it is safe.  Rest as told by your health care provider.  If physical therapy was prescribed, do exercises as told by your health care provider or physical therapist.  Return to your normal activities as told by your health care provider. Avoid positions and activities that make your symptoms worse. Ask your health care provider what activities are safe for you.  General instructions    Do not use any products that contain nicotine or tobacco, such as cigarettes, e-cigarettes, and chewing tobacco. These can delay healing. If you need help quitting, ask your health care provider.  Keep all follow-up visits as told by your health care provider or physical therapist. This is important.  How is this prevented?  To prevent a cervical sprain from happening again:  Use and maintain good posture. Make any needed adjustments to your workstation to help you do this.  Exercise regularly as told by your health care provider or physical therapist.  Avoid risky activities that may cause a cervical sprain.  Contact a health care provider if you have:  Symptoms that get worse or do not get better after 2 weeks of treatment.  Pain that gets worse or does not get better with medicine.  New, unexplained symptoms.  Sores or irritated skin on your neck from wearing your cervical collar.  Get help right away if:  You have severe pain.  You develop numbness, tingling, or weakness in any part of your body.  You cannot move a part of your body (you have paralysis).  You have neck pain along with severe dizziness or headache.  Summary  A cervical sprain is a stretch or tear in one or more of the ligaments in the neck.  Cervical sprains may be caused by trauma, such as an injury from a motor vehicle accident, a fall, or a sudden forward and backward whipping movement of the head and neck (whiplash injury).  Symptoms may develop right away after injury, or they may develop over a few days.  This condition may be treated with rest, ice, heat, medicines, physical therapy, and surgery.  This information is not intended to replace advice given to you by your health care provider. Make sure you discuss any questions you have with your health care provider.

## 2023-07-23 NOTE — ED STATDOCS - PHYSICAL EXAMINATION
Constitutional: Awake, Alert, non-toxic. No acute distress.  HEAD: Normocephalic, atraumatic.   EYES: PERRL, EOM intact, conjunctiva and sclera are clear bilaterally.  ENT: External ears normal. No rhinorrhea, no tracheal deviation   NECK: Supple, non-tender  CARDIOVASCULAR: regular rate and rhythm.  RESPIRATORY: Normal respiratory effort; breath sounds CTAB, no wheezes, rhonchi, or rales. Speaking in full sentences. No accessory muscle use.   ABDOMEN: Soft; non-tender, non-distended. No rebound or guarding.   MSK:  no lower extremity edema, no deformities  SKIN: Warm, dry  NEURO: A&O x3. Sensory and motor functions are grossly intact. Speech is normal. No facial droop.  PSYCH: Appearance and judgement seem appropriate for gender and age. Constitutional: Awake, Alert, non-toxic. No acute distress.  HEAD: Normocephalic, atraumatic.   EYES: PERRL, EOM intact, conjunctiva and sclera are clear bilaterally.  ENT: External ears normal. No rhinorrhea, no tracheal deviation   NECK: Supple, left trapezius TTP, no midline TTP, able to range neck though has pain  CARDIOVASCULAR: regular rate and rhythm.  RESPIRATORY: Normal respiratory effort; breath sounds CTAB, no wheezes, rhonchi, or rales. Speaking in full sentences. No accessory muscle use.   ABDOMEN: Soft; non-tender, non-distended. No rebound or guarding.   MSK:  no lower extremity edema, no deformities  SKIN: Warm, dry  NEURO: A&O x3. Sensory and motor functions are grossly intact. Speech is normal. No facial droop.  PSYCH: Appearance and judgement seem appropriate for gender and age.

## 2023-07-23 NOTE — ED STATDOCS - CARE PROVIDER_API CALL
Hiren Eagle  [address] [address]  Phone: [phone]  Fax: [phone]  Follow Up Time: 4-6 Days

## 2023-07-23 NOTE — ED STATDOCS - CLINICAL SUMMARY MEDICAL DECISION MAKING FREE TEXT BOX
Patient with likely musculoskeletal back pain given focal area of tenderness over the left trapezius region.  No chest pain to suggest atypical ACS.  EKG also appears to be nonischemic.  Patient with similar symptoms this before.  Has had minimal improvement with meloxicam.  We will try Toradol, Flexeril.

## 2023-07-23 NOTE — ED ADULT TRIAGE NOTE - CHIEF COMPLAINT QUOTE
PT C/O LEFT SHOULDER PAIN/ NECK PAIN  UNABLE TO TURN HEAD, BACK PAIN. PT STATES "SHE HAS INFUSION SHOTS FOR HER ARTHRITIS." DENIES CP/SOB/N/V/D/FEVER/CHILLS. EKG IN TRIAGE. pmh: ARTHRITIS.

## 2023-07-23 NOTE — ED STATDOCS - PROGRESS NOTE DETAILS
Patient had improvement of symptoms here with medications.  We will send anti-inflammatories to pharmacy, recommend primary care follow-up.  Plan to discharge patient. Return to ED precautions were discussed with the patient/family. All questions were answered. Frederic Ferrer MD.

## 2023-07-23 NOTE — ED STATDOCS - OBJECTIVE STATEMENT
59 y/o female with PMHx of rheumatoid arthritis presents to the ED c/o sharp L shoulder pain for 2 days, worsening yesterday. Patient has been taking Tylenol for the pain with little improvement. Patient denies CP. Patient states she cannot sleep on the shoulder due to the pain. Patient has penicillin allergy. Last arthritis shot 3 weeks ago. 57 y/o female with PMHx of rheumatoid arthritis presents to the ED c/o sharp L shoulder pain for 2 days, worsening yesterday. Patient has been taking Tylenol for the pain with little improvement. Patient denies CP. Patient states she cannot sleep on the shoulder due to the pain. pain is worse when she tries to move her neck and states it feels "tight.". has had similar symptoms to this in past. Patient has penicillin allergy. Last arthritis shot 3 weeks ago.

## 2023-07-23 NOTE — ED STATDOCS - PATIENT PORTAL LINK FT
You can access the FollowMyHealth Patient Portal offered by [facility] by registering at the following website: [email or web address]. By joining [employer]’s FollowMyHealth portal, you will also be able to view your health information using other applications (apps) compatible with our system.

## 2023-07-24 ENCOUNTER — NON-APPOINTMENT (OUTPATIENT)
Age: 58
End: 2023-07-24

## 2023-07-26 ENCOUNTER — EMERGENCY (EMERGENCY)
Facility: HOSPITAL | Age: 58
LOS: 0 days | Discharge: HOME | End: 2023-07-26
Attending: EMERGENCY MEDICINE
Payer: MEDICAID

## 2023-07-26 VITALS — WEIGHT: 128.97 LBS | HEIGHT: 57 IN

## 2023-07-26 VITALS
DIASTOLIC BLOOD PRESSURE: 67 MMHG | HEART RATE: 86 BPM | TEMPERATURE: 98 F | OXYGEN SATURATION: 99 % | SYSTOLIC BLOOD PRESSURE: 107 MMHG | RESPIRATION RATE: 18 BRPM

## 2023-07-26 DIAGNOSIS — Z88.0 ALLERGY STATUS TO PENICILLIN: ICD-10-CM

## 2023-07-26 DIAGNOSIS — K80.64 CALCULUS OF GALLBLADDER AND BILE DUCT WITH CHRONIC CHOLECYSTITIS WITHOUT OBSTRUCTION: ICD-10-CM

## 2023-07-26 DIAGNOSIS — Z91.018 ALLERGY TO OTHER FOODS: ICD-10-CM

## 2023-07-26 DIAGNOSIS — R07.81 PLEURODYNIA: ICD-10-CM

## 2023-07-26 DIAGNOSIS — M81.0 AGE-RELATED OSTEOPOROSIS WITHOUT CURRENT PATHOLOGICAL FRACTURE: ICD-10-CM

## 2023-07-26 DIAGNOSIS — M06.9 RHEUMATOID ARTHRITIS, UNSPECIFIED: ICD-10-CM
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
## 2023-07-26 DIAGNOSIS — Z98.890 OTHER SPECIFIED POSTPROCEDURAL STATES: Chronic | ICD-10-CM
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
## 2023-07-26 DIAGNOSIS — Z91.040 LATEX ALLERGY STATUS: ICD-10-CM

## 2023-07-26 PROCEDURE — 99284 EMERGENCY DEPT VISIT MOD MDM: CPT

## 2023-07-26 PROCEDURE — 71046 X-RAY EXAM CHEST 2 VIEWS: CPT

## 2023-07-26 PROCEDURE — 71046 X-RAY EXAM CHEST 2 VIEWS: CPT | Mod: 26

## 2023-07-26 PROCEDURE — 99283 EMERGENCY DEPT VISIT LOW MDM: CPT | Mod: 25

## 2023-07-26 RX ORDER — LIDOCAINE 4 G/100G
1 CREAM TOPICAL ONCE
Refills: 0 | Status: COMPLETED | OUTPATIENT
Start: 2023-07-26 | End: 2023-07-26

## 2023-07-26 RX ORDER — IBUPROFEN 200 MG
400 TABLET ORAL ONCE
Refills: 0 | Status: COMPLETED | OUTPATIENT
Start: 2023-07-26 | End: 2023-07-26

## 2023-07-26 RX ADMIN — LIDOCAINE 1 PATCH: 4 CREAM TOPICAL at 21:54

## 2023-07-26 RX ADMIN — Medication 400 MILLIGRAM(S): at 21:54

## 2023-07-26 NOTE — ED STATDOCS - CLINICAL SUMMARY MEDICAL DECISION MAKING FREE TEXT BOX
57 y/o female with RA was pushing a box, developed pain on the left lower rib. Will obtain XR, medicate for pain, and reassess.

## 2023-07-26 NOTE — ED STATDOCS - PHYSICAL EXAMINATION
Constitutional: NAD   Eyes: PERRLA  Head: Normocephalic   Mouth: MMM  Cardiac: regular rate   Resp: Lungs CTAB  GI: Abd s/nt/nd, no rebound or guarding.  Neuro: awake, alert, moving all extremities  Skin: No rashes   MSK: +TTP left lower ribs. No ecchymosis or swelling.

## 2023-07-26 NOTE — ED STATDOCS - ATTENDING APP SHARED VISIT CONTRIBUTION OF CARE
I, Shabnam Mcdonald MD, performed the initial face to face bedside interview with this patient regarding history of present illness, review of symptoms and relevant past medical, social and family history.  I completed an independent physical examination.  I was the initial provider who evaluated this patient. I have signed out the follow up of any pending tests (i.e. labs, radiological studies) to the ACP.  I have communicated the patient’s plan of care and disposition with the ACP.  The history, relevant review of systems, past medical and surgical history, medical decision making, and physical examination was documented by the scribe in my presence and I attest to the accuracy of the documentation.

## 2023-07-26 NOTE — ED STATDOCS - OBJECTIVE STATEMENT
57 y/o female with PMHx of RA, osteoporosis presents to the ED c/o left-sided lower rib pain after pushing a heavy box, stating she thinks something may have "popped."

## 2023-07-26 NOTE — ED STATDOCS - PROGRESS NOTE DETAILS
CXR with no acute findings, will medicate and dc home with pmd f/u, pain control, return precautions given  Snehal Meade PA-C Patient seen and evaluated, ED attending note and orders reviewed, will continue with patient follow up and care -Snehal Meade PA-C

## 2023-07-26 NOTE — ED STATDOCS - PATIENT PORTAL LINK FT
You can access the FollowMyHealth Patient Portal offered by [facility] by registering at the following website: [email or web address]. By joining [employer]’s FollowMyHealth portal, you will also be able to view your health information using other applications (apps) compatible with our system.

## 2023-07-26 NOTE — ED STATDOCS - NS ED ROS FT
Constitutional: No fever or chills  Eyes: No visual changes  HEENT: No throat pain  CV: No chest pain  Resp: No SOB no cough  GI: No abd pain, nausea or vomiting  : No dysuria  MSK: +Left lower rib pain  Skin: No rash  Neuro: No headache

## 2023-07-26 NOTE — ED ADULT TRIAGE NOTE - CHIEF COMPLAINT QUOTE
ambulatory to triage, complains of left rib pain, thinks it may be pulled muscle. worse with reaching arms upward and deep inspiration.

## 2023-08-08 ENCOUNTER — APPOINTMENT (OUTPATIENT)
Dept: RHEUMATOLOGY | Facility: CLINIC | Age: 58
End: 2023-08-08
Payer: COMMERCIAL

## 2023-08-08 VITALS
HEIGHT: 57 IN | WEIGHT: 126 LBS | TEMPERATURE: 97 F | OXYGEN SATURATION: 99 % | SYSTOLIC BLOOD PRESSURE: 111 MMHG | DIASTOLIC BLOOD PRESSURE: 68 MMHG | HEART RATE: 77 BPM | BODY MASS INDEX: 27.18 KG/M2

## 2023-08-08 DIAGNOSIS — M75.52 BURSITIS OF LEFT SHOULDER: ICD-10-CM

## 2023-08-08 DIAGNOSIS — Z91.89 OTHER SPECIFIED PERSONAL RISK FACTORS, NOT ELSEWHERE CLASSIFIED: ICD-10-CM

## 2023-08-08 PROCEDURE — 20610 DRAIN/INJ JOINT/BURSA W/O US: CPT | Mod: LT

## 2023-08-08 PROCEDURE — 99215 OFFICE O/P EST HI 40 MIN: CPT | Mod: 25

## 2023-08-08 RX ORDER — TRIAMCINOLONE ACETONIDE 80 MG/ML
80 INJECTION, SUSPENSION INTRA-ARTICULAR; INTRAMUSCULAR
Qty: 1 | Refills: 0 | Status: COMPLETED | OUTPATIENT
Start: 2023-08-08

## 2023-08-08 RX ADMIN — TRIAMCINOLONE ACETONIDE 0 MG/ML: 80 INJECTION, SUSPENSION INTRA-ARTICULAR; INTRAMUSCULAR at 00:00

## 2023-08-08 NOTE — PROCEDURE
[Today's Date:] : Date: [unfilled] [Soft Tissue Injection] : soft tissue injection was performed [Risks] : risks [Benefits] : benefits [Alternatives] : alternatives [Consent Obtained] : written consent was obtained prior to the procedure and is detailed in the patient's record [Patient] : Prior to the start of the procedure a time out was taken and the identity of the patient was confirmed via name and date of birth with the patient. The correct site and the procedure to be performed were confirmed. The correct side was confirmed if applicable. The availability of the correct equipment was verified [Therapeutic] : therapeutic [#1 Site: ______] : #1 site identified in the [unfilled] [Ethyl Chloride] : ethyl chloride [___ ml Inj] : [unfilled] ~Uml [1%] : 1%  [Betadine] : betadine solution [Alcohol] : alcohol [25 gauge 1.5 inch] : A 25 gauge 1.5 inch needle was used [___ml Steriod Preparation] : [unfilled] ml of steriod preparation  [Tolerated Well] : the patient tolerated the procedure well [No Complications] : there were no complications [Instructions Given] : handouts/patient instructions were given to patient [Patient Instructed to Call] : patient was instructed to call if redness at site, a decrease in range of motion or an increase in pain is noted after procedure. [FreeTextEntry1] : ice routinely 20/20 for next 24 hs and call if pain not improved w/ in 72 or  if worsened joint pain, or signs of infection including fevers, chills, redness at site ensues.  This is a strong steroid.. can cause anxiety, hunger, irritability, trouble sleeping, rarely causes palpitations or chest pain but if present go to ER and then let me know

## 2023-08-08 NOTE — REASON FOR VISIT
[Follow-Up: _____] : a [unfilled] follow-up visit [[employer] ] : provided by [employer]   [Interpreters_IDNumber] : [id number] [FreeTextEntry3] : [place]er

## 2023-08-08 NOTE — ASSESSMENT
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 with recurrent plantar fascitis and asymmetrical presentation and possible psoriasis - patch on L ankle/ possible scalp lesion.. but CCP > 250 w/ Vectra 12/22 of 52 excellent and full response to pred at 20 mg daily.. feels great on high dose steroids, always. Eventually started on Simponi Aria 1/23- with some but not full response despite loading dose w/ steroids. Still with active synovitis and so added  mg (200 mg is 3.5 mg/kg for 57 kilograms) well tolerated but not effective...after much encouragement started MTX in April 2023 (4 pills/week) for persistent synovitis in R ankle fullness, R plantar fasciites, cervical pain and R sided GH pain. Titrated up to 8 pills/week in May 2023 but due to compliance vs language barrier vs miscommunication, patient not fully compliant with MTX and thus had flares through Simponi Aria with elevated ESR/CRP.   Stopped HCQ- "too many medicines"  and inadequate response.   Over past 3 m has been c/w MTX at 20 mg wkly and though better presents today with active swelling/ pain in L SAB region- fullness throughout RTC.  80 mg kenalog given and again at this point recommend switching from Simponi Aria..- lengthy discussion through intepreter, now understands and will be getting Orencia for next treatment.. ideally next wk.  Informed again of need to continue to be c/w MTX at 20 mg likely for at least next 6 m. Verbalized through  understanding    -Could try Actemra but need to confirm no h/o diverticulitis or PUD in future if necessary.  NO active psoriasis now...  and still  NOTE:  Baseline Hand/ SI joint xrays 3/22 negative for erosions, no deformities / and neg [id number] Initial presentation + Achilles tendonitis + plantar fasciitis, R wrist and MCP/ MTP inflammation -Simponi Aria infusion 1/2023-5/2023 with no control in synovitis --> will not trial any other TNFi or biosimiliars  -HCQ 1/2023-4/2023 well tolerated but stopped taking and did not control synovitis  -MTX up to 20 mg well tolerated PO, trial SQ for compliance purposes and efficacy but did not continue.. couldn't tolerate Self injection   2) Compliance:  still unclear language barrier, reports understanding and speaks good English but following directions not always consistent.  Concerned about degree of active joint inflammation and repeated use of steroid for control.   Ideally with infusion tx should be better, RN team for support... reports compliance with addition of HCQ- again stopped and missed doses of MTX.. literature again provided in Japanese on RA, MTX and Orencia -For all future appts must use    3) Osteoporosis:  DEXA  1/19/23 w/ most severe T score -2.5... no comparison (different machine) but previously -2.3 at this level... FN -1.9 previously 0.8 and TH -1.4 previously 0.7.  No previous treatment or known fractures. RIsks:  long term steroids > 3 m (off now), age, RA. Will need treatment but will not address till stable control RA  -PCP suggested Prolia, only mild osteoporosis, guidelines suggest starting with BP.. , could be a candidate for biphosphate but if compliance or language barrier a concern, could do Reclast   4) Unintentional wt loss:  concerned about 10 lb loss over past few mnths, disease poorly controlled... better already with stable RA.. no wt loss/ gain in past 4 m   -Now with consistent use of MTX and Prednisone, up 7 lbs in 3 weeks, BMI 25 --> 27-> 28   Plan:  - Remain off  Prednisone and call if you feel like you need prednisone or if you feel like you are flaring   - Continue  Methotrexate pills at 20 mg - 8 tab likely for next 6 m at this dose..   -Take 1-3 tablets of Folic Acid every day except on the days you take Methotrexate  -Labs with infusions and as needed with office visits    -Start Orencia now!   - let us know if you have any problems (nausea, hair loss, mouth sores).  Ideally these will be minimized by taking Folic Acid   - Call if you develop any infections (you may need to hold MTX based on need for antibiotics)  - Also: hold MTX for 1-2 wk for any vaccines.    - will need to discuss tx for OP but too many issues now, would like to get RA controlled first.  Would recommend BP as initial tx in this setting.. See above   - given 80 mg kenalog today L SAB.. for immediate relief  -RTO 1 monthly to improve compliance and therefore RA control ideally

## 2023-08-08 NOTE — REVIEW OF SYSTEMS
[Recent Weight Gain (___ Lbs)] : recent [unfilled] ~Ulb weight gain [Dry Eyes] : dryness of the eyes [Loss Of Hearing] : hearing loss [Lower Ext Edema] : lower extremity edema [Arthralgias] : arthralgias [Joint Pain] : joint pain [Joint Swelling] : joint swelling [Joint Stiffness] : joint stiffness [As Noted in HPI] : as noted in HPI [Sleep Disturbances] : sleep disturbances [Anxiety] : anxiety [Negative] : Heme/Lymph [Fever] : no fever [Chills] : no chills [Feeling Poorly] : not feeling poorly [Feeling Tired] : not feeling tired [Recent Weight Loss (___ Lbs)] : no recent weight loss [Eye Pain] : no eye pain [Red Eyes] : eyes not red [Eyesight Problems] : no eyesight problems [Chest Pain] : no chest pain [Palpitations] : no palpitations [Shortness Of Breath] : no shortness of breath [Wheezing] : no wheezing [Cough] : no cough [SOB on Exertion] : no shortness of breath during exertion [Abdominal Pain] : no abdominal pain [Vomiting] : no vomiting [Constipation] : no constipation [Diarrhea] : no diarrhea [Skin Lesions] : no skin lesions [Skin Wound] : no skin wound [Itching] : no itching [Confused] : no confusion [Limb Weakness] : no limb weakness [Difficulty Walking] : no difficulty walking [Swollen Glands] : no swollen glands [Swollen Glands In The Neck] : no swollen glands in the neck [FreeTextEntry2] : wt loss over past few months.. stable past few weeks  [FreeTextEntry3] : denies inflammatory eye  [FreeTextEntry4] : unknown origin of Left sided hearing loss w/ chronic tinnitus- stable  [FreeTextEntry7] : GI distress with MTX SQ.. especially when injecting into abd  [FreeTextEntry9] : L shoulder pain and swelling now .. PRIOR: diffuse throughout bl elbows, shoulders, bl knees ankles R>L; no further pain at R GTB,   [de-identified] : about treatments, pain is waking her up from sleep   [de-identified] : no recent psoriasis episodes (only 1  episode previously) and still

## 2023-08-08 NOTE — HISTORY OF PRESENT ILLNESS
[FreeTextEntry1] : MUST USE  FOR ALL FUTURE APPTS:  8/8/23  overall better again when taking MTX now at 20 mg wkly.. did not tolerate self injection "felt more sick" and fearful of medication.. has resumed PO and tolerating fairly well but.  Also concerned about wt loss on MTX SQ  w/ wt from 131 to low 126.. stable for past few months.  - presents with L shoulder pain / inflammation likely throughout RTC..  - continues SImponi Aria.. did not switch as recommended .. but given ongoing synovitis needs to again reconsider  - admits to anxiety over taking any medications..  - updated labs on current regmen - c/w MTX still ESR 65..   ROS: +losing weight, 10lbs in one month, +HA, +dry eyes/dry mouth, +Hair loss  Denies fever, chills, rash, sob, chest pain, palpitations, abdominal pain, n/v/d.  HM: - up to date on DEXA  1/19/23 w/ most severe T score -2.5... no comparison (different machine) but previously -2.3 at this level... FN -1.9 previously 0.8 and TH -1.4 previously 0.7.  No previous treatment or known fractures. ..Need to start treatment for Osteoporosis but need better control of RA first    1) Inflammatory polyarthropathy:  CCP + RA (newly dx 3/22)  2) Osteoporosis  ______________________________________________________________________________   (Initial HPI  3/2/22)  58 yo female coming in for consult for long-standing hx of polyarthropathy.   - 2 years of joint pain and swelling, worsens with raining involving R wrist, R elbow, MCPs bilateral R > L, bilateral shoulders, bilateral knees, R ankle.  - Morning stiffness for 10 minutes approx.  - + Back pain worse in the AM, lower. Feels better in 5 minutes.  - Takes OTC Tylenol/Motrin/Aleve with minimal improvement.  - Ankle swelling has been assessed with XR, MRI - reportedly nothing remarkable.  - Plantar fasciitis bilateral dx, has tried steroid injection in bilateral feet with some improvement, but couldn't pursue this option long-term.  - Dx with arthritis, Ag - labs done recently - was told arthritis might be of rheum. origin - Dx with psoriasis a few years ago, hydrocortisone cream improved rash, was on left anterior ankle/distal leg     - Hx of hearing loss (not complete) in left ear - unknown origin. (took high dose of steroids without improvement in the past)  - Denies trigger finger, swelling in fingers, toe swelling, vision changes, diarrhea, abd pain, red eyes, night sweats, lymphadenopathy, chest pain, SOB, pleurisy.   Social:  - P 3 G 3 (oldest 35 y.o), , lives alone.  - Non-smoker, social drinker   FH + history of arthritis (aunt), no family hx of psoriasis

## 2023-08-08 NOTE — PHYSICAL EXAM
[General Appearance - Alert] : alert [General Appearance - In No Acute Distress] : in no acute distress [General Appearance - Well Nourished] : well nourished [General Appearance - Well Developed] : well developed [General Appearance - Well-Appearing] : healthy appearing [Sclera] : the sclera and conjunctiva were normal [Oropharynx] : the oropharynx was normal [Neck Appearance] : the appearance of the neck was normal [Respiration, Rhythm And Depth] : normal respiratory rhythm and effort [Exaggerated Use Of Accessory Muscles For Inspiration] : no accessory muscle use [Auscultation Breath Sounds / Voice Sounds] : lungs were clear to auscultation bilaterally [Heart Rate And Rhythm] : heart rate was normal and rhythm regular [Heart Sounds] : normal S1 and S2 [Heart Sounds Gallop] : no gallops [Murmurs] : no murmurs [Heart Sounds Pericardial Friction Rub] : no pericardial rub [Edema] : there was no peripheral edema [Cervical Lymph Nodes Enlarged Posterior Bilaterally] : posterior cervical [Cervical Lymph Nodes Enlarged Anterior Bilaterally] : anterior cervical [Supraclavicular Lymph Nodes Enlarged Bilaterally] : supraclavicular [No CVA Tenderness] : no ~M costovertebral angle tenderness [No Spinal Tenderness] : no spinal tenderness [Abnormal Walk] : normal gait [Nail Clubbing] : no clubbing  or cyanosis of the fingernails [Involuntary Movements] : no involuntary movements were seen [Motor Tone] : muscle strength and tone were normal [Skin Color & Pigmentation] : normal skin color and pigmentation [Skin Turgor] : normal skin turgor [] : no rash [Skin Lesions] : no skin lesions [No Focal Deficits] : no focal deficits [Oriented To Time, Place, And Person] : oriented to person, place, and time [Impaired Insight] : insight and judgment were intact [Affect] : the affect was normal [Mood] : the mood was normal [FreeTextEntry1] : Unclear understanding of medical information, compliance fair over past year --> MUST USE

## 2023-08-16 ENCOUNTER — APPOINTMENT (OUTPATIENT)
Dept: RHEUMATOLOGY | Facility: CLINIC | Age: 58
End: 2023-08-16

## 2023-08-22 ENCOUNTER — APPOINTMENT (OUTPATIENT)
Dept: RHEUMATOLOGY | Facility: CLINIC | Age: 58
End: 2023-08-22
Payer: COMMERCIAL

## 2023-08-22 VITALS
RESPIRATION RATE: 18 BRPM | OXYGEN SATURATION: 98 % | HEART RATE: 71 BPM | DIASTOLIC BLOOD PRESSURE: 77 MMHG | SYSTOLIC BLOOD PRESSURE: 111 MMHG

## 2023-08-22 PROCEDURE — 96413 CHEMO IV INFUSION 1 HR: CPT

## 2023-08-22 PROCEDURE — 96374 THER/PROPH/DIAG INJ IV PUSH: CPT | Mod: 59

## 2023-08-22 RX ORDER — CETIRIZINE HYDROCHLORIDE 10 MG/1
10 TABLET, COATED ORAL
Qty: 0 | Refills: 0 | Status: COMPLETED
Start: 2023-06-08

## 2023-08-22 RX ORDER — ABATACEPT 250 MG/15ML
250 INJECTION, POWDER, LYOPHILIZED, FOR SOLUTION INTRAVENOUS
Qty: 0 | Refills: 0 | Status: COMPLETED | OUTPATIENT
Start: 2023-07-19

## 2023-08-22 RX ORDER — METHYLPRED ACET/NACL,ISO-OS/PF 40 MG/ML
40 VIAL (ML) INJECTION
Qty: 1 | Refills: 0 | Status: COMPLETED
Start: 2023-06-08

## 2023-08-22 NOTE — HISTORY OF PRESENT ILLNESS
[Denies] : Denies [No] : No [N/A] : N/A [Declined] : Declined [Informed consent documented in EHR.] : Informed consent documented in EHR. [TB] : Tuberculosis screening [Hep acute panel] : Hepatitis acute panel [Left upper extremity] : Left upper extremity [22g] : 22g [Medication Name: ___] : Medication Name: [unfilled] [Start Time: ___] : Medication Start Time: [unfilled] [End Time: ___] : Medication End Time: [unfilled] [IV discontinued. Intact. No signs or symptoms of IV complications noted. Time: ___] : IV discontinued. Intact. No signs or symptoms of IV complications noted. Time: [unfilled] [Patient  instructed to seek medical attention with signs and symptoms of adverse effects] : Patient  instructed to seek medical attention with signs and symptoms of adverse effects [Patient left unit in no acute distress] : Patient left unit in no acute distress [Medications administered as ordered and tolerated well.] : Medications administered as ordered and tolerated well. [Blood drawn at time of visit] : Blood drawn at time of visit [de-identified] : ac [de-identified] : [id number] [de-identified] : pt observed for 30 min post infusion

## 2023-08-26 LAB
ALBUMIN SERPL ELPH-MCNC: 4.1 G/DL
ALP BLD-CCNC: 87 U/L
ALT SERPL-CCNC: 18 U/L
ANION GAP SERPL CALC-SCNC: 10 MMOL/L
AST SERPL-CCNC: 16 U/L
BILIRUB SERPL-MCNC: 0.5 MG/DL
BUN SERPL-MCNC: 20 MG/DL
CALCIUM SERPL-MCNC: 9.8 MG/DL
CHLORIDE SERPL-SCNC: 103 MMOL/L
CO2 SERPL-SCNC: 25 MMOL/L
CREAT SERPL-MCNC: 0.75 MG/DL
CRP SERPL-MCNC: 12 MG/L
EGFR: 92 ML/MIN/1.73M2
ERYTHROCYTE [SEDIMENTATION RATE] IN BLOOD BY WESTERGREN METHOD: 62 MM/HR
GLUCOSE SERPL-MCNC: 104 MG/DL
POTASSIUM SERPL-SCNC: 4.1 MMOL/L
PROT SERPL-MCNC: 7 G/DL
SODIUM SERPL-SCNC: 138 MMOL/L

## 2023-08-31 ENCOUNTER — APPOINTMENT (OUTPATIENT)
Dept: NEUROSURGERY | Facility: CLINIC | Age: 58
End: 2023-08-31

## 2023-09-03 ENCOUNTER — EMERGENCY (EMERGENCY)
Facility: HOSPITAL | Age: 58
LOS: 0 days | Discharge: HOME | End: 2023-09-04
Attending: EMERGENCY MEDICINE
Payer: COMMERCIAL

## 2023-09-03 VITALS — HEIGHT: 57 IN

## 2023-09-03 DIAGNOSIS — M06.9 RHEUMATOID ARTHRITIS, UNSPECIFIED: ICD-10-CM

## 2023-09-03 DIAGNOSIS — M25.511 PAIN IN RIGHT SHOULDER: ICD-10-CM

## 2023-09-03 DIAGNOSIS — M25.519 PAIN IN UNSPECIFIED SHOULDER: ICD-10-CM

## 2023-09-03 DIAGNOSIS — R07.89 OTHER CHEST PAIN: ICD-10-CM

## 2023-09-03 DIAGNOSIS — M54.50 LOW BACK PAIN, UNSPECIFIED: ICD-10-CM

## 2023-09-03 DIAGNOSIS — Z98.890 OTHER SPECIFIED POSTPROCEDURAL STATES: Chronic | ICD-10-CM

## 2023-09-03 DIAGNOSIS — Z91.018 ALLERGY TO OTHER FOODS: ICD-10-CM

## 2023-09-03 DIAGNOSIS — Z88.0 ALLERGY STATUS TO PENICILLIN: ICD-10-CM

## 2023-09-03 DIAGNOSIS — Z91.040 LATEX ALLERGY STATUS: ICD-10-CM

## 2023-09-03 LAB
ALBUMIN SERPL ELPH-MCNC: 3 G/DL — LOW (ref 3.3–5)
ALP SERPL-CCNC: 98 U/L — SIGNIFICANT CHANGE UP (ref 40–120)
ALT FLD-CCNC: 21 U/L — SIGNIFICANT CHANGE UP (ref 12–78)
ANION GAP SERPL CALC-SCNC: 9 MMOL/L — SIGNIFICANT CHANGE UP (ref 5–17)
AST SERPL-CCNC: 11 U/L — LOW (ref 15–37)
BASOPHILS # BLD AUTO: 0.01 K/UL — SIGNIFICANT CHANGE UP (ref 0–0.2)
BASOPHILS NFR BLD AUTO: 0.1 % — SIGNIFICANT CHANGE UP (ref 0–2)
BILIRUB SERPL-MCNC: 0.5 MG/DL — SIGNIFICANT CHANGE UP (ref 0.2–1.2)
BUN SERPL-MCNC: 17 MG/DL — SIGNIFICANT CHANGE UP (ref 7–23)
CALCIUM SERPL-MCNC: 8.5 MG/DL — SIGNIFICANT CHANGE UP (ref 8.5–10.1)
CHLORIDE SERPL-SCNC: 110 MMOL/L — HIGH (ref 96–108)
CO2 SERPL-SCNC: 23 MMOL/L — SIGNIFICANT CHANGE UP (ref 22–31)
CREAT SERPL-MCNC: 0.75 MG/DL — SIGNIFICANT CHANGE UP (ref 0.5–1.3)
D DIMER BLD IA.RAPID-MCNC: 375 NG/ML DDU — HIGH
EGFR: 92 ML/MIN/1.73M2 — SIGNIFICANT CHANGE UP
EOSINOPHIL # BLD AUTO: 0.05 K/UL — SIGNIFICANT CHANGE UP (ref 0–0.5)
EOSINOPHIL NFR BLD AUTO: 0.6 % — SIGNIFICANT CHANGE UP (ref 0–6)
GLUCOSE SERPL-MCNC: 118 MG/DL — HIGH (ref 70–99)
HCT VFR BLD CALC: 34.2 % — LOW (ref 34.5–45)
HGB BLD-MCNC: 11.2 G/DL — LOW (ref 11.5–15.5)
IMM GRANULOCYTES NFR BLD AUTO: 0.2 % — SIGNIFICANT CHANGE UP (ref 0–0.9)
LYMPHOCYTES # BLD AUTO: 1.31 K/UL — SIGNIFICANT CHANGE UP (ref 1–3.3)
LYMPHOCYTES # BLD AUTO: 15.7 % — SIGNIFICANT CHANGE UP (ref 13–44)
MCHC RBC-ENTMCNC: 27.9 PG — SIGNIFICANT CHANGE UP (ref 27–34)
MCHC RBC-ENTMCNC: 32.7 GM/DL — SIGNIFICANT CHANGE UP (ref 32–36)
MCV RBC AUTO: 85.1 FL — SIGNIFICANT CHANGE UP (ref 80–100)
MONOCYTES # BLD AUTO: 0.5 K/UL — SIGNIFICANT CHANGE UP (ref 0–0.9)
MONOCYTES NFR BLD AUTO: 6 % — SIGNIFICANT CHANGE UP (ref 2–14)
NEUTROPHILS # BLD AUTO: 6.43 K/UL — SIGNIFICANT CHANGE UP (ref 1.8–7.4)
NEUTROPHILS NFR BLD AUTO: 77.4 % — HIGH (ref 43–77)
PLATELET # BLD AUTO: 263 K/UL — SIGNIFICANT CHANGE UP (ref 150–400)
POTASSIUM SERPL-MCNC: 3.6 MMOL/L — SIGNIFICANT CHANGE UP (ref 3.5–5.3)
POTASSIUM SERPL-SCNC: 3.6 MMOL/L — SIGNIFICANT CHANGE UP (ref 3.5–5.3)
PROT SERPL-MCNC: 7.1 GM/DL — SIGNIFICANT CHANGE UP (ref 6–8.3)
RBC # BLD: 4.02 M/UL — SIGNIFICANT CHANGE UP (ref 3.8–5.2)
RBC # FLD: 15.3 % — HIGH (ref 10.3–14.5)
SODIUM SERPL-SCNC: 142 MMOL/L — SIGNIFICANT CHANGE UP (ref 135–145)
TROPONIN I, HIGH SENSITIVITY RESULT: 3.66 NG/L — SIGNIFICANT CHANGE UP
WBC # BLD: 8.32 K/UL — SIGNIFICANT CHANGE UP (ref 3.8–10.5)
WBC # FLD AUTO: 8.32 K/UL — SIGNIFICANT CHANGE UP (ref 3.8–10.5)

## 2023-09-03 PROCEDURE — 99285 EMERGENCY DEPT VISIT HI MDM: CPT

## 2023-09-03 PROCEDURE — 93010 ELECTROCARDIOGRAM REPORT: CPT

## 2023-09-03 PROCEDURE — 96374 THER/PROPH/DIAG INJ IV PUSH: CPT | Mod: XU

## 2023-09-03 PROCEDURE — 36415 COLL VENOUS BLD VENIPUNCTURE: CPT

## 2023-09-03 PROCEDURE — 71275 CT ANGIOGRAPHY CHEST: CPT | Mod: MA

## 2023-09-03 PROCEDURE — 80053 COMPREHEN METABOLIC PANEL: CPT

## 2023-09-03 PROCEDURE — 71045 X-RAY EXAM CHEST 1 VIEW: CPT

## 2023-09-03 PROCEDURE — 99285 EMERGENCY DEPT VISIT HI MDM: CPT | Mod: 25

## 2023-09-03 PROCEDURE — 84484 ASSAY OF TROPONIN QUANT: CPT

## 2023-09-03 PROCEDURE — 85025 COMPLETE CBC W/AUTO DIFF WBC: CPT

## 2023-09-03 PROCEDURE — 85379 FIBRIN DEGRADATION QUANT: CPT

## 2023-09-03 PROCEDURE — 71275 CT ANGIOGRAPHY CHEST: CPT | Mod: 26,MA

## 2023-09-03 PROCEDURE — 93005 ELECTROCARDIOGRAM TRACING: CPT

## 2023-09-03 PROCEDURE — 71045 X-RAY EXAM CHEST 1 VIEW: CPT | Mod: 26

## 2023-09-03 RX ORDER — FAMOTIDINE 10 MG/ML
20 INJECTION INTRAVENOUS ONCE
Refills: 0 | Status: COMPLETED | OUTPATIENT
Start: 2023-09-03 | End: 2023-09-03

## 2023-09-03 RX ADMIN — FAMOTIDINE 20 MILLIGRAM(S): 10 INJECTION INTRAVENOUS at 22:59

## 2023-09-03 NOTE — ED ADULT NURSE NOTE - OBJECTIVE STATEMENT
Pt presents to the ED c/o chest discomfort x3 days. Pt states " I usually take medication for acid reflux or Tylenol and it goes away but today it hasn't." Pt did not take anything for pain today. Pt endorses pain is dull but came on suddenly, denies eating prior to onset of pain. Denies sob, palpitations, dizziness, lightheadedness, fevers, or chills. Pt states pain worsens when coughing. Hx of rheumatoid arthritis. IV established in left arm with a 20G, labs drawn and sent, call bell in reach, warm blanket provided, bed in lowest position, side rails up x2, MD evaluation in progress, pt on telemetry.

## 2023-09-03 NOTE — ED ADULT NURSE NOTE - HISTORY OF COVID-19 VACCINATION
Mother states sick for over 4 weeks. Fevers+, no vomiting. Throat pain+, cough+. Mother states possibly yeast infection  
Vaccine status unknown

## 2023-09-03 NOTE — ED PROVIDER NOTE - CLINICAL SUMMARY MEDICAL DECISION MAKING FREE TEXT BOX
Jovany Martinez MD Patient was signed out to me pending CTA no PE seen, okay for discharge no pain at this time.  Trop negative.

## 2023-09-03 NOTE — ED PROVIDER NOTE - PATIENT PORTAL LINK FT
You can access the FollowMyHealth Patient Portal offered by [facility] by registering at the following website: [email or web address]. By joining [employer]’s FollowMyHealth portal, you will also be able to view your health information using other applications (apps) compatible with our system.

## 2023-09-03 NOTE — ED ADULT TRIAGE NOTE - CHIEF COMPLAINT QUOTE
Pt ambulatory to ED c/o chest discomfort with radiation to lower back and shoulder x1 day. Denies N/V/D/SOB. no meds PTA. EKG in progress

## 2023-09-03 NOTE — ED PROVIDER NOTE - OBJECTIVE STATEMENT
59 y/o female with a PMHx of osteoporosis, rheumatoid arthritis presents to the ED c/o mid/R sided chest discomfort radiating to lower back and shoulder for 2 days. Patient states last week she had a bad episode of acid reflux, took Pepto bismol with some relief, now with pain in chest. Denies N/V/D, SOB. No medications PTA. Denies recent injuries, history of heart disease.

## 2023-09-03 NOTE — ED ADULT NURSE NOTE - NSFALLUNIVINTERV_ED_ALL_ED
Bed/Stretcher in lowest position, wheels locked, appropriate side rails in place/Call bell, personal items and telephone in reach/Instruct patient to call for assistance before getting out of bed/chair/stretcher/Non-slip footwear applied when patient is off stretcher/[place] to call system/Physically safe environment - no spills, clutter or unnecessary equipment/Purposeful proactive rounding/Room/bathroom lighting operational, light cord in reach

## 2023-09-04 VITALS
TEMPERATURE: 98 F | OXYGEN SATURATION: 98 % | HEART RATE: 68 BPM | RESPIRATION RATE: 20 BRPM | DIASTOLIC BLOOD PRESSURE: 60 MMHG | SYSTOLIC BLOOD PRESSURE: 123 MMHG

## 2023-09-06 ENCOUNTER — APPOINTMENT (OUTPATIENT)
Dept: RHEUMATOLOGY | Facility: CLINIC | Age: 58
End: 2023-09-06
Payer: COMMERCIAL

## 2023-09-06 VITALS
OXYGEN SATURATION: 97 % | HEART RATE: 65 BPM | SYSTOLIC BLOOD PRESSURE: 96 MMHG | RESPIRATION RATE: 18 BRPM | DIASTOLIC BLOOD PRESSURE: 64 MMHG

## 2023-09-06 VITALS
RESPIRATION RATE: 17 BRPM | SYSTOLIC BLOOD PRESSURE: 115 MMHG | TEMPERATURE: 98 F | OXYGEN SATURATION: 95 % | HEART RATE: 65 BPM | DIASTOLIC BLOOD PRESSURE: 68 MMHG

## 2023-09-06 PROCEDURE — 96374 THER/PROPH/DIAG INJ IV PUSH: CPT | Mod: 59

## 2023-09-06 PROCEDURE — 96413 CHEMO IV INFUSION 1 HR: CPT

## 2023-09-06 RX ORDER — CETIRIZINE HYDROCHLORIDE 10 MG/1
10 TABLET, COATED ORAL
Qty: 0 | Refills: 0 | Status: COMPLETED | OUTPATIENT
Start: 2023-07-28

## 2023-09-06 RX ORDER — METHYLPRED ACET/NACL,ISO-OS/PF 40 MG/ML
40 VIAL (ML) INJECTION
Qty: 1 | Refills: 0 | Status: COMPLETED
Start: 2023-06-08

## 2023-09-06 RX ORDER — ABATACEPT 250 MG/15ML
250 INJECTION, POWDER, LYOPHILIZED, FOR SOLUTION INTRAVENOUS
Qty: 0 | Refills: 0 | Status: COMPLETED | OUTPATIENT
Start: 2023-08-10

## 2023-09-06 NOTE — HISTORY OF PRESENT ILLNESS
[2] : 2 [N/A] : N/A [Denies] : Denies [No] : No [Yes] : Yes [Declined] : Declined [Informed consent documented in EHR.] : Informed consent documented in EHR. [TB] : Tuberculosis screening [Hep acute panel] : Hepatitis acute panel [de-identified] : joints yet improved from previous visit [Left upper extremity] : Left upper extremity [22g] : 22g [Medication Name: ___] : Medication Name: [unfilled] [Start Time: ___] : Medication Start Time: [unfilled] [End Time: ___] : Medication End Time: [unfilled] [IV discontinued. Intact. No signs or symptoms of IV complications noted. Time: ___] : IV discontinued. Intact. No signs or symptoms of IV complications noted. Time: [unfilled] [Patient  instructed to seek medical attention with signs and symptoms of adverse effects] : Patient  instructed to seek medical attention with signs and symptoms of adverse effects [Patient left unit in no acute distress] : Patient left unit in no acute distress [Medications administered as ordered and tolerated well.] : Medications administered as ordered and tolerated well. [de-identified] : [id number]

## 2023-09-12 ENCOUNTER — APPOINTMENT (OUTPATIENT)
Dept: RHEUMATOLOGY | Facility: CLINIC | Age: 58
End: 2023-09-12

## 2023-09-19 ENCOUNTER — EMERGENCY (EMERGENCY)
Facility: HOSPITAL | Age: 58
LOS: 0 days | Discharge: HOME | End: 2023-09-20
Attending: EMERGENCY MEDICINE
Payer: COMMERCIAL

## 2023-09-19 VITALS — HEIGHT: 57 IN | WEIGHT: 119.93 LBS

## 2023-09-19 DIAGNOSIS — Z88.0 ALLERGY STATUS TO PENICILLIN: ICD-10-CM

## 2023-09-19 DIAGNOSIS — Z98.890 OTHER SPECIFIED POSTPROCEDURAL STATES: Chronic | ICD-10-CM

## 2023-09-19 DIAGNOSIS — R35.0 FREQUENCY OF MICTURITION: ICD-10-CM

## 2023-09-19 DIAGNOSIS — M81.0 AGE-RELATED OSTEOPOROSIS WITHOUT CURRENT PATHOLOGICAL FRACTURE: ICD-10-CM

## 2023-09-19 DIAGNOSIS — M54.9 DORSALGIA, UNSPECIFIED: ICD-10-CM

## 2023-09-19 DIAGNOSIS — Z90.49 ACQUIRED ABSENCE OF OTHER SPECIFIED PARTS OF DIGESTIVE TRACT: ICD-10-CM

## 2023-09-19 DIAGNOSIS — Z91.040 LATEX ALLERGY STATUS: ICD-10-CM

## 2023-09-19 DIAGNOSIS — R10.9 UNSPECIFIED ABDOMINAL PAIN: ICD-10-CM

## 2023-09-19 DIAGNOSIS — M06.9 RHEUMATOID ARTHRITIS, UNSPECIFIED: ICD-10-CM

## 2023-09-19 DIAGNOSIS — Z91.018 ALLERGY TO OTHER FOODS: ICD-10-CM

## 2023-09-19 LAB
ADD ON TEST-SPECIMEN IN LAB: SIGNIFICANT CHANGE UP
ALBUMIN SERPL ELPH-MCNC: 3 G/DL — LOW (ref 3.3–5)
ALP SERPL-CCNC: 92 U/L — SIGNIFICANT CHANGE UP (ref 40–120)
ALT FLD-CCNC: 32 U/L — SIGNIFICANT CHANGE UP (ref 12–78)
ANION GAP SERPL CALC-SCNC: 3 MMOL/L — LOW (ref 5–17)
APPEARANCE UR: CLEAR — SIGNIFICANT CHANGE UP
AST SERPL-CCNC: 21 U/L — SIGNIFICANT CHANGE UP (ref 15–37)
BACTERIA # UR AUTO: NEGATIVE — SIGNIFICANT CHANGE UP
BASOPHILS # BLD AUTO: 0.01 K/UL — SIGNIFICANT CHANGE UP (ref 0–0.2)
BASOPHILS NFR BLD AUTO: 0.1 % — SIGNIFICANT CHANGE UP (ref 0–2)
BILIRUB SERPL-MCNC: 0.6 MG/DL — SIGNIFICANT CHANGE UP (ref 0.2–1.2)
BILIRUB UR-MCNC: NEGATIVE — SIGNIFICANT CHANGE UP
BUN SERPL-MCNC: 10 MG/DL — SIGNIFICANT CHANGE UP (ref 7–23)
CALCIUM SERPL-MCNC: 9.1 MG/DL — SIGNIFICANT CHANGE UP (ref 8.5–10.1)
CHLORIDE SERPL-SCNC: 106 MMOL/L — SIGNIFICANT CHANGE UP (ref 96–108)
CO2 SERPL-SCNC: 30 MMOL/L — SIGNIFICANT CHANGE UP (ref 22–31)
COLOR SPEC: YELLOW — SIGNIFICANT CHANGE UP
COMMENT - URINE: SIGNIFICANT CHANGE UP
CREAT SERPL-MCNC: 0.71 MG/DL — SIGNIFICANT CHANGE UP (ref 0.5–1.3)
DIFF PNL FLD: ABNORMAL
EGFR: 98 ML/MIN/1.73M2 — SIGNIFICANT CHANGE UP
EOSINOPHIL # BLD AUTO: 0.02 K/UL — SIGNIFICANT CHANGE UP (ref 0–0.5)
EOSINOPHIL NFR BLD AUTO: 0.3 % — SIGNIFICANT CHANGE UP (ref 0–6)
EPI CELLS # UR: SIGNIFICANT CHANGE UP
GLUCOSE SERPL-MCNC: 99 MG/DL — SIGNIFICANT CHANGE UP (ref 70–99)
GLUCOSE UR QL: NEGATIVE — SIGNIFICANT CHANGE UP
HCT VFR BLD CALC: 37.9 % — SIGNIFICANT CHANGE UP (ref 34.5–45)
HGB BLD-MCNC: 12.2 G/DL — SIGNIFICANT CHANGE UP (ref 11.5–15.5)
IMM GRANULOCYTES NFR BLD AUTO: 0.4 % — SIGNIFICANT CHANGE UP (ref 0–0.9)
KETONES UR-MCNC: NEGATIVE — SIGNIFICANT CHANGE UP
LACTATE SERPL-SCNC: 0.5 MMOL/L — LOW (ref 0.7–2)
LEUKOCYTE ESTERASE UR-ACNC: NEGATIVE — SIGNIFICANT CHANGE UP
LIDOCAIN IGE QN: 58 U/L — SIGNIFICANT CHANGE UP (ref 13–75)
LYMPHOCYTES # BLD AUTO: 1.76 K/UL — SIGNIFICANT CHANGE UP (ref 1–3.3)
LYMPHOCYTES # BLD AUTO: 22.7 % — SIGNIFICANT CHANGE UP (ref 13–44)
MCHC RBC-ENTMCNC: 27.2 PG — SIGNIFICANT CHANGE UP (ref 27–34)
MCHC RBC-ENTMCNC: 32.2 GM/DL — SIGNIFICANT CHANGE UP (ref 32–36)
MCV RBC AUTO: 84.6 FL — SIGNIFICANT CHANGE UP (ref 80–100)
MONOCYTES # BLD AUTO: 0.41 K/UL — SIGNIFICANT CHANGE UP (ref 0–0.9)
MONOCYTES NFR BLD AUTO: 5.3 % — SIGNIFICANT CHANGE UP (ref 2–14)
NEUTROPHILS # BLD AUTO: 5.51 K/UL — SIGNIFICANT CHANGE UP (ref 1.8–7.4)
NEUTROPHILS NFR BLD AUTO: 71.2 % — SIGNIFICANT CHANGE UP (ref 43–77)
NITRITE UR-MCNC: NEGATIVE — SIGNIFICANT CHANGE UP
PH UR: 6.5 — SIGNIFICANT CHANGE UP (ref 5–8)
PLATELET # BLD AUTO: 327 K/UL — SIGNIFICANT CHANGE UP (ref 150–400)
POTASSIUM SERPL-MCNC: 3.6 MMOL/L — SIGNIFICANT CHANGE UP (ref 3.5–5.3)
POTASSIUM SERPL-SCNC: 3.6 MMOL/L — SIGNIFICANT CHANGE UP (ref 3.5–5.3)
PROT SERPL-MCNC: 7.3 GM/DL — SIGNIFICANT CHANGE UP (ref 6–8.3)
PROT UR-MCNC: NEGATIVE — SIGNIFICANT CHANGE UP
RBC # BLD: 4.48 M/UL — SIGNIFICANT CHANGE UP (ref 3.8–5.2)
RBC # FLD: 14.5 % — SIGNIFICANT CHANGE UP (ref 10.3–14.5)
RBC CASTS # UR COMP ASSIST: SIGNIFICANT CHANGE UP /HPF (ref 0–4)
SODIUM SERPL-SCNC: 139 MMOL/L — SIGNIFICANT CHANGE UP (ref 135–145)
SP GR SPEC: 1.01 — SIGNIFICANT CHANGE UP (ref 1.01–1.02)
TROPONIN I, HIGH SENSITIVITY RESULT: <3 NG/L — SIGNIFICANT CHANGE UP
UROBILINOGEN FLD QL: NEGATIVE — SIGNIFICANT CHANGE UP
WBC # BLD: 7.74 K/UL — SIGNIFICANT CHANGE UP (ref 3.8–10.5)
WBC # FLD AUTO: 7.74 K/UL — SIGNIFICANT CHANGE UP (ref 3.8–10.5)
WBC UR QL: NEGATIVE /HPF — SIGNIFICANT CHANGE UP (ref 0–5)

## 2023-09-19 PROCEDURE — 85025 COMPLETE CBC W/AUTO DIFF WBC: CPT

## 2023-09-19 PROCEDURE — 71260 CT THORAX DX C+: CPT | Mod: 26,MD

## 2023-09-19 PROCEDURE — 99285 EMERGENCY DEPT VISIT HI MDM: CPT | Mod: 25

## 2023-09-19 PROCEDURE — 87086 URINE CULTURE/COLONY COUNT: CPT

## 2023-09-19 PROCEDURE — 80053 COMPREHEN METABOLIC PANEL: CPT

## 2023-09-19 PROCEDURE — 83605 ASSAY OF LACTIC ACID: CPT

## 2023-09-19 PROCEDURE — 93970 EXTREMITY STUDY: CPT

## 2023-09-19 PROCEDURE — 99285 EMERGENCY DEPT VISIT HI MDM: CPT

## 2023-09-19 PROCEDURE — 74177 CT ABD & PELVIS W/CONTRAST: CPT | Mod: MD

## 2023-09-19 PROCEDURE — 36415 COLL VENOUS BLD VENIPUNCTURE: CPT

## 2023-09-19 PROCEDURE — 84484 ASSAY OF TROPONIN QUANT: CPT

## 2023-09-19 PROCEDURE — 83690 ASSAY OF LIPASE: CPT

## 2023-09-19 PROCEDURE — 74177 CT ABD & PELVIS W/CONTRAST: CPT | Mod: 26,MD

## 2023-09-19 PROCEDURE — 81001 URINALYSIS AUTO W/SCOPE: CPT

## 2023-09-19 PROCEDURE — 71260 CT THORAX DX C+: CPT | Mod: MD

## 2023-09-19 RX ORDER — SODIUM CHLORIDE 9 MG/ML
1000 INJECTION INTRAMUSCULAR; INTRAVENOUS; SUBCUTANEOUS ONCE
Refills: 0 | Status: COMPLETED | OUTPATIENT
Start: 2023-09-19 | End: 2023-09-19

## 2023-09-19 RX ORDER — AZITHROMYCIN 500 MG/1
500 TABLET, FILM COATED ORAL ONCE
Refills: 0 | Status: COMPLETED | OUTPATIENT
Start: 2023-09-19 | End: 2023-09-19

## 2023-09-19 RX ADMIN — AZITHROMYCIN 500 MILLIGRAM(S): 500 TABLET, FILM COATED ORAL at 23:45

## 2023-09-19 RX ADMIN — SODIUM CHLORIDE 1000 MILLILITER(S): 9 INJECTION INTRAMUSCULAR; INTRAVENOUS; SUBCUTANEOUS at 22:14

## 2023-09-19 NOTE — ED STATDOCS - EYES, MLM
clear bilaterally.  Pupils equal, round, and reactive to light. clear bilaterally.  Pupils equal, round, and reactive to accomodation.

## 2023-09-19 NOTE — ED STATDOCS - CARE PROVIDER_API CALL
Luis Manuel Ko  Gastroenterology  [address] [place] [id number]  Phone: [phone]  Fax: [phone]  Follow Up Time:

## 2023-09-19 NOTE — ED STATDOCS - NS_ ATTENDINGSCRIBEDETAILS _ED_A_ED_FT
I Jonny Talley MD saw and examined the patient. Scribe documented for me and under my supervision. I have modified the scribe's documentation where necessary to reflect my history, physical exam and other relevant documentations pertinent to the care of the patient.

## 2023-09-19 NOTE — ED STATDOCS - OBJECTIVE STATEMENT
57 y/o female with PMHx of osteoporosis, rheumatoid arthritis, s/p cholecystectomy presents to ED for atraumatic right sided back pain x3 days. Reports pain is worse upon deep inspiration. No chest pain, shortness of breath, dizziness, or lightheadedness. Denies cardiac hx. No urinary complaints, but notes increased urinary frequency due to high water intake. Not on anticoagulation therapy. 57 y/o female with PMHx of osteoporosis, rheumatoid arthritis, s/p cholecystectomy presents to ED for atraumatic right sided back pain x3 days. Reports pain is worse upon deep inspiration. No chest pain, shortness of breath, dizziness, or lightheadedness. Denies cardiac hx. No urinary complaints, but notes increased urinary frequency due to high water intake. Not on anticoagulation therapy. No syncope or near syncope. No seizures. No abdominal anterior pain. No saddle anesthesia. No incontinence of urine or stool. No skin rash. No diaphoresis. No sensation of pain in the interscapular or upper back.

## 2023-09-19 NOTE — ED ADULT TRIAGE NOTE - CHIEF COMPLAINT QUOTE
pt presents to the ED for back pain x3 days. pt reports no hx of back pain or injury. denies any recent injury to the area. denies any falls. denies any difficulty urinating. pt is well appearing, A&Ox4 and ambulatory to triage with no other complaints or discomforts reported at this time.

## 2023-09-19 NOTE — ED ADULT NURSE NOTE - OBJECTIVE STATEMENT
57 y/o female awake alert and oriented x4 presents to ED c/o atraumatic right back pain x 3 days. Denies chest pain, sob, nausea, vomiting, diarrhea, hematuria or dysuria. + increased urinary frequency.

## 2023-09-19 NOTE — ED STATDOCS - PATIENT PORTAL LINK FT
You can access the FollowMyHealth Patient Portal offered by [facility] by registering at the following website: [email or web address]. By joining [facility]’s FollowMyHealth portal, you will also be able to view your health information using other applications (apps) compatible with our system.

## 2023-09-19 NOTE — ED ADULT NURSE NOTE - NSFALLUNIVINTERV_ED_ALL_ED
Bed/Stretcher in lowest position, wheels locked, appropriate side rails in place/Call bell, personal items and telephone in reach/Instruct patient to call for assistance before getting out of bed/chair/stretcher/Non-slip footwear applied when patient is off stretcher/[place] to call system/Physically safe environment - no spills, clutter or unnecessary equipment/Purposeful proactive rounding/Room/bathroom lighting operational, light cord in reach

## 2023-09-19 NOTE — ED STATDOCS - NSFOLLOWUPINSTRUCTIONS_ED_ALL_ED_FT
Please note that I have provided you with the results of your labs and imaging. As discussed there is no emergent findings in the CAT scan. However there are incidental findings including findings in the liver that need further outpatient follow up. You also have vein findings in the CAT scan that is concerning likely for varices, which you mentioned you have had before. I have performed an ultrasound of your leg that shows no evidence of any blood clots. Please make sure you return to us immediately if you have any repeat or worsening of your pain. Please follow up with your primary care physician and a gastroenterologists. If you do not have one then contact the information provided for one here. For all other health concerns return to us immediately. Also note that while CAT scan imaging is ideal for emergent evaluation it does not rule out subtle findings such as ulcer, tumors or cancers and that you might require further outpatient imaging as warranted.     ________  Flank Pain, Adult  Flank pain is pain that is located on the side of the body between the upper abdomen and the spine. This area is called the flank. The pain may occur over a short period of time (acute), or it may be long-term or recurring (chronic). It may be mild or severe. Flank pain can be caused by many things, including:  Muscle soreness or injury.  Kidney infection, kidney stones, or kidney disease.  Stress.  A disease of the spine (vertebral disk disease).  A lung infection (pneumonia).  Fluid around the lungs (pulmonary edema).  A skin rash caused by the chickenpox virus (shingles).  Tumors that affect the back of the abdomen.  Gallbladder disease.  Follow these instructions at home:  A comparison of three sample cups showing dark yellow, yellow, and pale yellow urine.  Drink enough fluid to keep your urine pale yellow.  Rest as told by your health care provider.  Take over-the-counter and prescription medicines only as told by your health care provider.  Keep a journal to track what has caused your flank pain and what has made it feel better.  Keep all follow-up visits. This is important.  Contact a health care provider if:  Your pain is not controlled with medicine.  You have new symptoms.  Your pain gets worse.  Your symptoms last longer than 2–3 days.  You have trouble urinating or you are urinating very frequently.  Get help right away if:  You have trouble breathing or you are short of breath.  Your abdomen hurts or it is swollen or red.  You have nausea or vomiting.  You feel faint, or you faint.  You have blood in your urine.  You have flank pain and a fever.  These symptoms may represent a serious problem that is an emergency. Do not wait to see if the symptoms will go away. Get medical help right away. Call your local emergency services (911 in the [place] Do not drive yourself to the hospital.    Summary  Flank pain is pain that is located on the side of the body between the upper abdomen and the spine.  The pain may occur over a short period of time (acute), or it may be long-term or recurring (chronic). It may be mild or severe.  Flank pain can be caused by many things.  Contact your health care provider if your symptoms get worse or last longer than 2–3 days.  This information is not intended to replace advice given to you by your health care provider. Make sure you discuss any questions you have with your health care provider.

## 2023-09-19 NOTE — ED STATDOCS - CLINICAL SUMMARY MEDICAL DECISION MAKING FREE TEXT BOX
labs, CT, pain management, and reassess. labs, CT, pain management, and reassess.    flank pain, CT, labs, reassessment, r/o stone, visceral pathology, pulmonary pathology. Discussed with patient the results of the labs and the imaging. Patient has hx of chronic varices, so the CT findings is in line with it. Spoke with patient about the finding of the left liver abnormal lesions or cysts. The need to follow up outpatient with further testing. Patient's US is negative. Patient is ambulatory, tolerating PO, no fever or chills, pain is now gone. Strict return precautions and follow up with GI provided for her.

## 2023-09-19 NOTE — ED STATDOCS - PROGRESS NOTE DETAILS
Mayank WOOD: Discussed with patient the results of the labs and the imaging. Patient has hx of chronic varices, so the CT findings is in line with it. Spoke with patient about the finding of the left liver abnormal lesions or cysts. The need to follow up outpatient with further testing. Patient's US is negative. Patient is ambulatory, tolerating PO, no fever or chills, pain is now gone. Strict return precautions and follow up with GI provided for her.

## 2023-09-19 NOTE — ED STATDOCS - DIFFERENTIAL DIAGNOSIS
Differential Diagnosis flank pain, CT, labs, reassessment, r/o stone, visceral pathology, pulmonary pathology. Discussed with patient the results of the labs and the imaging. Patient has hx of chronic varices, so the CT findings is in line with it. Spoke with patient about the finding of the left liver abnormal lesions or cysts. The need to follow up outpatient with further testing. Patient's US is negative. Patient is ambulatory, tolerating PO, no fever or chills, pain is now gone. Strict return precautions and follow up with GI provided for her.

## 2023-09-20 VITALS
DIASTOLIC BLOOD PRESSURE: 62 MMHG | HEART RATE: 88 BPM | RESPIRATION RATE: 18 BRPM | SYSTOLIC BLOOD PRESSURE: 110 MMHG | OXYGEN SATURATION: 99 %

## 2023-09-20 PROCEDURE — 93970 EXTREMITY STUDY: CPT | Mod: 26

## 2023-09-21 LAB
CULTURE RESULTS: SIGNIFICANT CHANGE UP
SPECIMEN SOURCE: SIGNIFICANT CHANGE UP

## 2023-09-25 ENCOUNTER — NON-APPOINTMENT (OUTPATIENT)
Age: 58
End: 2023-09-25

## 2023-10-02 RX ORDER — ABATACEPT 250 MG/15ML
250 INJECTION, POWDER, LYOPHILIZED, FOR SOLUTION INTRAVENOUS
Refills: 0 | Status: ACTIVE | OUTPATIENT
Start: 2023-07-19

## 2023-10-03 ENCOUNTER — APPOINTMENT (OUTPATIENT)
Dept: RHEUMATOLOGY | Facility: CLINIC | Age: 58
End: 2023-10-03
Payer: COMMERCIAL

## 2023-10-03 VITALS
HEART RATE: 74 BPM | DIASTOLIC BLOOD PRESSURE: 61 MMHG | OXYGEN SATURATION: 98 % | RESPIRATION RATE: 16 BRPM | SYSTOLIC BLOOD PRESSURE: 91 MMHG | TEMPERATURE: 98 F

## 2023-10-03 PROCEDURE — 96374 THER/PROPH/DIAG INJ IV PUSH: CPT | Mod: 59

## 2023-10-03 PROCEDURE — 96413 CHEMO IV INFUSION 1 HR: CPT

## 2023-10-03 RX ORDER — METHYLPRED ACET/NACL,ISO-OS/PF 40 MG/ML
40 VIAL (ML) INJECTION
Qty: 1 | Refills: 0 | Status: COMPLETED
Start: 2023-06-08

## 2023-10-03 RX ORDER — CETIRIZINE HYDROCHLORIDE 10 MG/1
10 TABLET, COATED ORAL
Qty: 0 | Refills: 0 | Status: COMPLETED
Start: 2023-06-08

## 2023-10-03 RX ORDER — ABATACEPT 250 MG/15ML
250 INJECTION, POWDER, LYOPHILIZED, FOR SOLUTION INTRAVENOUS
Qty: 0 | Refills: 0 | Status: COMPLETED | OUTPATIENT
Start: 2023-09-07

## 2023-10-10 ENCOUNTER — APPOINTMENT (OUTPATIENT)
Dept: RHEUMATOLOGY | Facility: CLINIC | Age: 58
End: 2023-10-10

## 2023-10-14 ENCOUNTER — LABORATORY RESULT (OUTPATIENT)
Age: 58
End: 2023-10-14

## 2023-10-19 ENCOUNTER — APPOINTMENT (OUTPATIENT)
Dept: RHEUMATOLOGY | Facility: CLINIC | Age: 58
End: 2023-10-19
Payer: COMMERCIAL

## 2023-10-19 VITALS
SYSTOLIC BLOOD PRESSURE: 110 MMHG | HEIGHT: 57 IN | BODY MASS INDEX: 26.97 KG/M2 | TEMPERATURE: 98 F | HEART RATE: 85 BPM | WEIGHT: 125 LBS | OXYGEN SATURATION: 98 % | DIASTOLIC BLOOD PRESSURE: 60 MMHG

## 2023-10-19 VITALS — WEIGHT: 124.8 LBS | BODY MASS INDEX: 27.01 KG/M2

## 2023-10-19 DIAGNOSIS — K21.9 GASTRO-ESOPHAGEAL REFLUX DISEASE W/OUT ESOPHAGITIS: ICD-10-CM

## 2023-10-19 PROCEDURE — 99214 OFFICE O/P EST MOD 30 MIN: CPT | Mod: 25

## 2023-10-19 PROCEDURE — 20610 DRAIN/INJ JOINT/BURSA W/O US: CPT | Mod: RT

## 2023-10-19 RX ORDER — METHYLPRED ACET/NACL,ISO-OS/PF 80 MG/ML
80 VIAL (ML) INJECTION
Qty: 1 | Refills: 0 | Status: COMPLETED | OUTPATIENT
Start: 2023-10-19

## 2023-10-19 RX ADMIN — METHYLPREDNISOLONE ACETATE 0 MG/ML: 80 INJECTION, SUSPENSION INTRA-ARTICULAR; INTRALESIONAL; INTRAMUSCULAR; SOFT TISSUE at 00:00

## 2023-10-21 PROBLEM — K21.9 GASTROESOPHAGEAL REFLUX DISEASE WITHOUT ESOPHAGITIS: Status: ACTIVE | Noted: 2023-10-21

## 2023-10-21 NOTE — ED ADULT NURSE NOTE - PAIN: BODY LOCATION
What Type Of Note Output Would You Prefer (Optional)?: Bullet Format What Is The Reason For Today's Visit?: Full Body Skin Examination back What Is The Reason For Today's Visit? (Being Monitored For X): concerning skin lesions on an annual basis

## 2023-10-26 ENCOUNTER — RX RENEWAL (OUTPATIENT)
Age: 58
End: 2023-10-26

## 2023-11-06 ENCOUNTER — APPOINTMENT (OUTPATIENT)
Dept: RHEUMATOLOGY | Facility: CLINIC | Age: 58
End: 2023-11-06
Payer: COMMERCIAL

## 2023-11-06 VITALS
OXYGEN SATURATION: 98 % | TEMPERATURE: 98 F | HEART RATE: 78 BPM | RESPIRATION RATE: 16 BRPM | DIASTOLIC BLOOD PRESSURE: 60 MMHG | SYSTOLIC BLOOD PRESSURE: 95 MMHG

## 2023-11-06 VITALS
RESPIRATION RATE: 16 BRPM | HEART RATE: 98 BPM | DIASTOLIC BLOOD PRESSURE: 76 MMHG | SYSTOLIC BLOOD PRESSURE: 111 MMHG | OXYGEN SATURATION: 95 %

## 2023-11-06 PROCEDURE — 96413 CHEMO IV INFUSION 1 HR: CPT

## 2023-11-06 PROCEDURE — 96374 THER/PROPH/DIAG INJ IV PUSH: CPT | Mod: 59

## 2023-11-06 RX ORDER — METHYLPRED ACET/NACL,ISO-OS/PF 40 MG/ML
40 VIAL (ML) INJECTION
Qty: 1 | Refills: 0 | Status: COMPLETED
Start: 2023-06-08

## 2023-11-06 RX ORDER — ABATACEPT 250 MG/15ML
250 INJECTION, POWDER, LYOPHILIZED, FOR SOLUTION INTRAVENOUS
Qty: 0 | Refills: 0 | Status: COMPLETED | OUTPATIENT
Start: 2023-10-05

## 2023-11-06 RX ORDER — CETIRIZINE HYDROCHLORIDE 10 MG/1
10 TABLET, COATED ORAL
Qty: 0 | Refills: 0 | Status: COMPLETED
Start: 2023-06-08

## 2023-11-28 ENCOUNTER — APPOINTMENT (OUTPATIENT)
Dept: RHEUMATOLOGY | Facility: CLINIC | Age: 58
End: 2023-11-28

## 2023-12-04 ENCOUNTER — APPOINTMENT (OUTPATIENT)
Dept: RHEUMATOLOGY | Facility: CLINIC | Age: 58
End: 2023-12-04
Payer: COMMERCIAL

## 2023-12-04 VITALS
SYSTOLIC BLOOD PRESSURE: 112 MMHG | HEART RATE: 67 BPM | DIASTOLIC BLOOD PRESSURE: 76 MMHG | RESPIRATION RATE: 17 BRPM | TEMPERATURE: 98 F | OXYGEN SATURATION: 98 %

## 2023-12-04 VITALS
RESPIRATION RATE: 16 BRPM | TEMPERATURE: 98 F | HEART RATE: 70 BPM | OXYGEN SATURATION: 98 % | DIASTOLIC BLOOD PRESSURE: 68 MMHG | SYSTOLIC BLOOD PRESSURE: 114 MMHG

## 2023-12-04 PROCEDURE — 96374 THER/PROPH/DIAG INJ IV PUSH: CPT | Mod: 59

## 2023-12-04 PROCEDURE — 96413 CHEMO IV INFUSION 1 HR: CPT

## 2023-12-04 RX ORDER — CETIRIZINE HYDROCHLORIDE 10 MG/1
10 TABLET, COATED ORAL
Qty: 0 | Refills: 0 | Status: COMPLETED
Start: 2023-06-08

## 2023-12-04 RX ORDER — METHYLPRED ACET/NACL,ISO-OS/PF 40 MG/ML
40 VIAL (ML) INJECTION
Qty: 1 | Refills: 0 | Status: COMPLETED
Start: 2023-06-08

## 2023-12-04 RX ORDER — ABATACEPT 250 MG/15ML
250 INJECTION, POWDER, LYOPHILIZED, FOR SOLUTION INTRAVENOUS
Qty: 0 | Refills: 0 | Status: COMPLETED | OUTPATIENT
Start: 2023-11-30

## 2023-12-26 ENCOUNTER — APPOINTMENT (OUTPATIENT)
Dept: RHEUMATOLOGY | Facility: CLINIC | Age: 58
End: 2023-12-26
Payer: COMMERCIAL

## 2023-12-26 VITALS
TEMPERATURE: 97.2 F | DIASTOLIC BLOOD PRESSURE: 68 MMHG | HEIGHT: 57 IN | SYSTOLIC BLOOD PRESSURE: 112 MMHG | BODY MASS INDEX: 26.54 KG/M2 | OXYGEN SATURATION: 98 % | WEIGHT: 123 LBS | HEART RATE: 95 BPM

## 2023-12-26 PROCEDURE — 96372 THER/PROPH/DIAG INJ SC/IM: CPT

## 2023-12-26 PROCEDURE — 99214 OFFICE O/P EST MOD 30 MIN: CPT | Mod: 25

## 2023-12-26 RX ORDER — METHYLPREDNISOLONE 40 MG/ML
40 INJECTION, POWDER, LYOPHILIZED, FOR SOLUTION INTRAMUSCULAR; INTRAVENOUS
Refills: 0 | Status: ACTIVE | OUTPATIENT
Start: 2023-12-26 | End: 1900-01-01

## 2023-12-26 RX ORDER — TRIAMCINOLONE ACETONIDE 80 MG/ML
80 INJECTION, SUSPENSION INTRA-ARTICULAR; INTRAMUSCULAR
Qty: 1 | Refills: 0 | Status: COMPLETED | OUTPATIENT
Start: 2023-12-26

## 2023-12-26 RX ADMIN — TRIAMCINOLONE ACETONIDE 0 MG/ML: 80 INJECTION, SUSPENSION INTRA-ARTICULAR; INTRAMUSCULAR at 00:00

## 2023-12-26 NOTE — REASON FOR VISIT
[Follow-Up: _____] : a [unfilled] follow-up visit [[employer] ] : provided by [employer]   [Interpreters_IDNumber] : [id number] [FreeTextEntry3] : [place]ese

## 2023-12-26 NOTE — CONSULT LETTER
[Dear  ___] : Dear  [unfilled], [Courtesy Letter:] : I had the pleasure of seeing your patient, [unfilled], in my office today. [Referral Closing:] : Thank you very much for seeing this patient.  If you have any questions, please do not hesitate to contact me. 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 with recurrent plantar fascitis and asymmetrical presentation and possible psoriasis - patch on L ankle/ possible scalp lesion..  but CCP > 250 w/ Vectra 12/22 of 52 excellent and full response to pred at 20 mg daily.. feels great on high dose steroids, always. Eventually started on Simponi Aria 1/23- with some but not full response despite loading dose w/ steroids. Still with active synovitis after several months.. added  mg (200 mg is 3.5 mg/kg for 57 kilograms) well tolerated but not effective...after much encouragement started MTX in April 2023 (4 pills/week) for persistent synovitis in R ankle fullness, R plantar fasciites, cervical pain and R sided GH pain. Titrated up to 8 pills/week in May 2023 but due to compliance vs language barrier vs miscommunication, patient not fully compliant with MTX and thus had flares through Simponi Aria with elevated ESR/CRP. Stopped HCQ- "too many medicines" and inadequate response. - Over past 3 m has been c/w MTX at 20 mg wkly and though better still active synovitis and added Orencia- first infusion  (8/22/23) with monthly infusions since (excellent compliance) and c/w MTX 20 mg wkly / off all oral steroids.. was doing very well till recently-  Now presents w/ active swelling/ pain most prominently BL wrists, neck, R knee and ankles.. GIven 80 mg Depo to R knee and will continue w/ other tx as noted.. may need to resume low dose pred at NMT 5 mg daily.   NO active psoriasis now... and still NOTE: Baseline Hand/ SI joint xrays 3/22 negative for erosions, no deformities / and neg HLAB27 Initial presentation + Achilles tendonitis + plantar fasciitis, R wrist and MCP/ MTP inflammation -Simponi Aria infusion 1/2023-5/2023 with no control in synovitis --> will not trial any other TNFi or biosimiliars -HCQ 1/2023-4/2023 well tolerated but stopped taking and did not control synovitis -MTX up to 20 mg well tolerated PO, trial SQ for compliance purposes and efficacy but did not continue.. couldn't tolerate Self injection In future if needed:  would consider Actemra/ or Atiya (low cardiac risk)  2) Compliance: still unclear language barrier, reports understanding and speaks good English but following directions not always consistent. Concerned about degree of active joint inflammation and repeated use of steroid for control. Ideally with infusion tx should be better, RN team for support... reports compliance  Need to provide all literature in Ukrainian Will have [facility] team call and insure that she understood instructions once she got home - dtr to translate  -For all future appts must use - STILL - Written instructions in English (reported that she had family member that could translate for her but not done) given last visit and again today- will f/u next week to insure these were translated for her   3) Osteoporosis: DEXA 1/19/23 w/ most severe T score -2.5... no comparison (different machine) but previously -2.3 at this level... FN -1.9 previously 0.8 and TH -1.4 previously 0.7. No previous treatment or known fractures. RIsks: long term steroids > 3 m (off now), age, RA. Will need treatment but will not address till stable control RA still not achieved..  -PCP suggested Prolia, only mild osteoporosis, guidelines suggest starting with BP.. , could be a candidate for biphosphate but if compliance or language barrier a concern, could do Reclast  4) Unintentional wt loss: fully resolved over past 6 m.. stable BMI 27 consistently  -Now with consistent use of MTX and Prednisone, up 7 lbs in 3 weeks, BMI 25 --> 27-> 28   Plan:  - Remain off Prednisone and call if you feel like you need prednisone or if you feel like you are flaring  -Labs with infusions and as needed with office visits  - administered 80 mg of Depo Medrol in R knee ice you knee tonight  - continue Orencia infusion w/ methylprednisolone...next infusion 11/6/2023 at 4 pm make sure nurses get Dr. Coker during your next infusion, so she can see you  - get flu vaccine during your next infusion  - continue MTX 20 mg continue taking it how you have been (4 pills in the morning and 4 pills at night)  - increase folic acid to 2 mg for GI discomfort   - starting on pantoprazole   - starting on 5 mg of cyclobenzaprine (take 1 or 2 pills) for neck stiffness  - use heat for neck tightness   - Return to office 11/28/2023 at 4 pm  - Call if you develop any infections (you may need to hold MTX based on need for antibiotics)  - Also: hold MTX for 1-2 wk for any vaccines.  - will need to discuss tx for OP but too many issues now, would like to get RA controlled first. Would recommend BP as initial tx in this setting.. See above [FreeTextEntry3] : Sheela Diaz DNP, ANP-[facility] or Rheumatology [facility]

## 2023-12-26 NOTE — REVIEW OF SYSTEMS
[Dry Eyes] : dryness of the eyes [Loss Of Hearing] : hearing loss [Lower Ext Edema] : lower extremity edema [Arthralgias] : arthralgias [Joint Pain] : joint pain [Joint Swelling] : joint swelling [Joint Stiffness] : joint stiffness [Sleep Disturbances] : sleep disturbances [Anxiety] : anxiety [Negative] : Heme/Lymph [Fever] : no fever [Chills] : no chills [Feeling Poorly] : not feeling poorly [Feeling Tired] : not feeling tired [Eye Pain] : no eye pain [Red Eyes] : eyes not red [Eyesight Problems] : no eyesight problems [Chest Pain] : no chest pain [Palpitations] : no palpitations [Shortness Of Breath] : no shortness of breath [Wheezing] : no wheezing [Cough] : no cough [SOB on Exertion] : no shortness of breath during exertion [Abdominal Pain] : no abdominal pain [Vomiting] : no vomiting [Constipation] : no constipation [Diarrhea] : no diarrhea [Skin Lesions] : no skin lesions [Skin Wound] : no skin wound [Itching] : no itching [Confused] : no confusion [Limb Weakness] : no limb weakness [Difficulty Walking] : no difficulty walking [Swollen Glands] : no swollen glands [Swollen Glands In The Neck] : no swollen glands in the neck [FreeTextEntry2] : c/o wt loss due to change in appetite...reports 8 lbs but only down 1lb since 8/2023 [FreeTextEntry3] : denies inflammatory eye  [FreeTextEntry4] : unknown origin of Left sided hearing loss w/ chronic tinnitus- stable  [FreeTextEntry7] : GI distress with MTX SQ.. especially when injecting into abd  [FreeTextEntry9] : pain throughout bl hands, shoulders, knees, and back   [de-identified] : no recent psoriasis episodes (only 1  episode previously) and still  [de-identified] : about treatments, pain is waking her up from sleep

## 2023-12-26 NOTE — HISTORY OF PRESENT ILLNESS
[FreeTextEntry1] : MUST USE  AT ALL VISITS TO HELP W/ PATIENT COMPLIANCE  12/26/23   full control of right knee pain and swelling following steroid injection last visit - had been doing well till 2 days ago.. presents with overt pain/ swelling most severe L 3 MCP.. and R 1 extensor tendon- overt pain/ swelling and limited motion without pain..  - pantoprazole made abd pain worse, but it's better without.. spontaneously - reports c/w MTX 20 mg/wk and Orencia mntly, has not missed doses and still NOT willing to consider SQ injection  - did not start cyclobenzaprine for neck pain / stiffness and has not started PT.. Sleep better lately - ROS otherwise ok   - up to date on DEXA  1/19/23 w/ most severe T score -2.5... no comparison (different machine) but previously -2.3 at this level... FN -1.9 previously 0.8 and TH -1.4 previously 0.7.  No previous treatment or known fractures. ..Need to start treatment for Osteoporosis but need better control of RA first    1) Inflammatory polyarthropathy:  CCP + RA (newly dx 3/22)  2) Osteoporosis  ______________________________________________________________________________   (Initial HPI  3/2/22)  58 yo female coming in for consult for long-standing hx of polyarthropathy.   - 2 years of joint pain and swelling, worsens with raining involving R wrist, R elbow, MCPs bilateral R > L, bilateral shoulders, bilateral knees, R ankle.  - Morning stiffness for 10 minutes approx.  - + Back pain worse in the AM, lower. Feels better in 5 minutes.  - Takes OTC Tylenol/Motrin/Aleve with minimal improvement.  - Ankle swelling has been assessed with XR, MRI - reportedly nothing remarkable.  - Plantar fasciitis bilateral dx, has tried steroid injection in bilateral feet with some improvement, but couldn't pursue this option long-term.  - Dx with arthritis, [place] - labs done recently - was told arthritis might be of rheum. origin - Dx with psoriasis a few years ago, hydrocortisone cream improved rash, was on left anterior ankle/distal leg     - Hx of hearing loss (not complete) in left ear - unknown origin. (took high dose of steroids without improvement in the past)  - Denies trigger finger, swelling in fingers, toe swelling, vision changes, diarrhea, abd pain, red eyes, night sweats, lymphadenopathy, chest pain, SOB, pleurisy.   Social:  - P 3 G 3 (oldest 35 y.o), , lives alone.  - Non-smoker, social drinker   FH + history of arthritis (aunt), no family hx of psoriasis

## 2023-12-26 NOTE — PROCEDURE
[Today's Date:] : Date: [unfilled] [Arthrocentesis] : arthrocentesis was performed [Soft Tissue Injection] : soft tissue injection was performed [Risks] : risks [Benefits] : benefits [Alternatives] : alternatives [Consent Obtained] : written consent was obtained prior to the procedure and is detailed in the patient's record [Patient] : Prior to the start of the procedure a time out was taken and the identity of the patient was confirmed via name and date of birth with the patient. The correct site and the procedure to be performed were confirmed. The correct side was confirmed if applicable. The availability of the correct equipment was verified [Therapeutic] : therapeutic [#1 Site: ______] : #1 site identified in the [unfilled] [Ethyl Chloride] : ethyl chloride [___ ml Inj] : [unfilled] ~Uml [1%] : 1%  [Betadine] : betadine solution [Alcohol] : alcohol [25 gauge 1.5 inch] : A 25 gauge 1.5 inch needle was used [Cell Count] : cell count [Gram Stain & Culture] : gram stain and culture [Depomedrol ___ mg] : Depomedrol [unfilled] mg [Tolerated Well] : the patient tolerated the procedure well [No Complications] : there were no complications [Instructions Given] : handouts/patient instructions were given to patient [Patient Instructed to Call] : patient was instructed to call if redness at site, a decrease in range of motion or an increase in pain is noted after procedure. [25 gauge 5/8  inch] : A 25 gauge 5/8 inch needle was used [___ml Steriod Preparation] : [unfilled] ml of steriod preparation  [FreeTextEntry1] : . This is a strong steroid. It can cause anxiety, hunger, irritability, trouble sleeping, rarely causes palpitations or chest pain but if present go to to ER and then let me know.

## 2023-12-26 NOTE — ASSESSMENT
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 with recurrent plantar fascitis and asymmetrical presentation and possible psoriasis - patch on L ankle/ possible scalp lesion.. but CCP > 250 w/ Vectra 12/22 of 52 excellent and full response to pred at 20 mg daily.. feels great on high dose steroids, always. Eventually started on Simponi Aria 1/23- with some but not full response despite loading dose w/ steroids. Still with active synovitis after several months.. added  mg (200 mg is 3.5 mg/kg for 57 kilograms) well tolerated but not effective...after much encouragement started MTX in April 2023 (4 pills/week) for persistent synovitis in R ankle fullness, R plantar fasciites, cervical pain.  Titrated up to 8 pills/week in May 2023 but due to compliance vs language barrier vs miscommunication, patient not fully compliant with MTX and thus had flares through Simponi Aria with elevated ESR/CRP. Stopped HCQ- "too many medicines" and inadequate response. - Over past 3 m has been c/w MTX at 20 mg wkly and switched to mnthly Orencia..  had actually been doing very well since last visit with only one flare- few days ago.  Next infusion due 1/2/24..  Given IM kenalog today.. and will follow closely.. ideally wont have to make any other changes.  Will continue to follow closely every 2 m.. Orencia- first infusion (8/22/23) with monthly infusions since (excellent compliance) and c/w MTX 20 mg wkly / off all oral steroids.. was doing very well till recently-. NO active psoriasis now... and still NOTE: Baseline Hand/ SI joint xrays 3/22 negative for erosions, no deformities / and neg HLAB27 Initial presentation + Achilles tendonitis + plantar fasciitis, R wrist and MCP/ MTP inflammation -Simponi Aria infusion 1/2023-5/2023 with no control in synovitis --> will not trial any other TNFi or biosimiliars -HCQ 1/2023-4/2023 well tolerated but stopped taking and did not control synovitis -MTX up to 20 mg well tolerated PO, trial SQ for compliance purposes and efficacy but did not continue.. couldn't tolerate Self injection In future if needed: would consider Actemra/ or Atiya (low cardiac risk)  2) Compliance: still unclear language barrier, reports understanding and speaks good English but following directions not always consistent. Concerned about degree of active joint inflammation and repeated use of steroid for control. Ideally with infusion tx should be better, RN team for support... reports compliance Need to provide all literature in Czech -For all future appts must use - STILL  3) Osteoporosis: DEXA 1/19/23 w/ most severe T score -2.5... no comparison (different machine) but previously -2.3 at this level... FN -1.9 previously 0.8 and TH -1.4 previously 0.7. No previous treatment or known fractures. RIsks: long term steroids > 3 m (off now), age, RA. Will need treatment but will not address till stable control RA still not achieved.. -PCP suggested Prolia, only mild osteoporosis, guidelines suggest starting with BP.. , could be a candidate for biphosphate but if compliance or language barrier a concern, could do Reclast  4) Unintentional wt loss: fully resolved over past 6 m.. stable BMI 27 consistently -Now with consistent use of MTX and Prednisone, up 7 lbs in 3 weeks, BMI 25 --> 27-> 28   Plan:  - Remain off Prednisone and call if you feel like you need prednisone or if you feel like you are flaring  -Labs with infusions and as needed with office visits  - administered 80 mg of kenalog today IM for acute swelling / pain hands..   - continue Orencia infusion w/ methylprednisolone... make sure you check in with me during infusions.   - get flu vaccine during your next infusion (still needed but flaring today.. will wait)   - continue MTX 20 mg continue taking it how you have been (4 pills in the morning and 4 pills at night)  - increase folic acid to 2 mg for GI discomfort  - starting on 5 mg of cyclobenzaprine (take 1 or 2 pills) for neck stiffness (again encouraged)  - use heat for neck tightness  - Return to office  q 2 m for RPA   - Call if you develop any infections (you may need to hold MTX based on need for antibiotics)  - Also: hold MTX for 1-2 wk for any vaccines.  - will need to discuss tx for OP but too many issues now, would like to get RA controlled first. Would recommend BP as initial tx in this setting.. See above.

## 2024-01-01 NOTE — ED PROVIDER NOTE - NO SIGNIFICANT PAST SURGICAL HISTORY
[Influenza] : Influenza [FreeTextEntry1] : pt here with dad for flu vaccine doing well <<----- Click to add NO significant Past Surgical History

## 2024-01-03 ENCOUNTER — APPOINTMENT (OUTPATIENT)
Dept: RHEUMATOLOGY | Facility: CLINIC | Age: 59
End: 2024-01-03
Payer: COMMERCIAL

## 2024-01-03 VITALS
OXYGEN SATURATION: 98 % | HEART RATE: 66 BPM | RESPIRATION RATE: 17 BRPM | SYSTOLIC BLOOD PRESSURE: 102 MMHG | TEMPERATURE: 98 F | DIASTOLIC BLOOD PRESSURE: 67 MMHG

## 2024-01-03 VITALS
DIASTOLIC BLOOD PRESSURE: 69 MMHG | SYSTOLIC BLOOD PRESSURE: 108 MMHG | RESPIRATION RATE: 17 BRPM | OXYGEN SATURATION: 98 % | TEMPERATURE: 98 F | HEART RATE: 69 BPM

## 2024-01-03 PROCEDURE — 96374 THER/PROPH/DIAG INJ IV PUSH: CPT | Mod: 59

## 2024-01-03 PROCEDURE — 96413 CHEMO IV INFUSION 1 HR: CPT

## 2024-01-03 RX ORDER — METHYLPREDNISOLONE 40 MG/ML
40 INJECTION, POWDER, LYOPHILIZED, FOR SOLUTION INTRAMUSCULAR; INTRAVENOUS
Qty: 1 | Refills: 0 | Status: COMPLETED
Start: 2023-12-26

## 2024-01-03 RX ORDER — CETIRIZINE HYDROCHLORIDE 10 MG/1
10 TABLET, COATED ORAL
Qty: 0 | Refills: 0 | Status: COMPLETED
Start: 2023-06-08

## 2024-01-03 RX ORDER — ABATACEPT 250 MG/15ML
250 INJECTION, POWDER, LYOPHILIZED, FOR SOLUTION INTRAVENOUS
Qty: 0 | Refills: 0 | Status: COMPLETED | OUTPATIENT
Start: 2023-11-02

## 2024-01-03 NOTE — HISTORY OF PRESENT ILLNESS
[Denies] : Denies [No] : No [Yes] : Yes [Declined] : Declined [TB] : Tuberculosis screening [Hep acute panel] : Hepatitis acute panel [Right upper extremity] : Right upper extremity [Medication Name: ___] : Medication Name: [unfilled] [Start Time: ___] : Medication Start Time: [unfilled] [End Time: ___] : Medication End Time: [unfilled] [Total Amount Administered: ___] : Total Amount Administered: [unfilled] [IV discontinued. Intact. No signs or symptoms of IV complications noted. Time: ___] : IV discontinued. Intact. No signs or symptoms of IV complications noted. Time: [unfilled] [Patient  instructed to seek medical attention with signs and symptoms of adverse effects] : Patient  instructed to seek medical attention with signs and symptoms of adverse effects [Patient left unit in no acute distress] : Patient left unit in no acute distress [Medications administered as ordered and tolerated well.] : Medications administered as ordered and tolerated well. [de-identified] : 1/31/24 at 4:00 pm next infuison

## 2024-01-07 ENCOUNTER — NON-APPOINTMENT (OUTPATIENT)
Age: 59
End: 2024-01-07

## 2024-01-15 ENCOUNTER — RX RENEWAL (OUTPATIENT)
Age: 59
End: 2024-01-15

## 2024-01-15 LAB
AA PROT SER-MCNC: 9.5 UG/ML
ALBUMIN SERPL ELPH-MCNC: 4.2 G/DL
ALP BLD-CCNC: 91 U/L
ALT SERPL-CCNC: 21 U/L
ANION GAP SERPL CALC-SCNC: 9 MMOL/L
AST SERPL-CCNC: 13 U/L
BILIRUB SERPL-MCNC: 0.4 MG/DL
BIOMARKER COMMENT: NORMAL
BUN SERPL-MCNC: 16 MG/DL
CALCIUM SERPL-MCNC: 9.6 MG/DL
CHLORIDE SERPL-SCNC: 106 MMOL/L
CO2 SERPL-SCNC: 24 MMOL/L
CREAT SERPL-MCNC: 0.62 MG/DL
CRP SERPL-MCNC: 2 MG/L
CRP SERPL-MCNC: <3 MG/L
EGF SERPL-MCNC: 69 PG/ML
EGFR: 103 ML/MIN/1.73M2
ERYTHROCYTE [SEDIMENTATION RATE] IN BLOOD BY WESTERGREN METHOD: 84 MM/HR
FOOTNOTE: NORMAL
GLUCOSE SERPL-MCNC: 92 MG/DL
HAV IGM SER QL: NONREACTIVE
HBV CORE IGM SER QL: NONREACTIVE
HBV SURFACE AG SER QL: NONREACTIVE
HCV AB SER QL: NONREACTIVE
HCV S/CO RATIO: 0.09 S/CO
IL6 SERPL-MCNC: 7.5 PG/ML
LEPTIN SERPL-MCNC: 33 NG/ML
Lab: NORMAL
Lab: NORMAL
M TB IFN-G BLD-IMP: NEGATIVE
MMP-1 SERPL-MCNC: 2.8 NG/ML
MMP-3 SERPL-MCNC: 38 NG/ML
POTASSIUM SERPL-SCNC: 4.4 MMOL/L
PROT SERPL-MCNC: 6.9 G/DL
QUANTIFERON TB PLUS MITOGEN MINUS NIL: 1.16 IU/ML
QUANTIFERON TB PLUS NIL: 0.09 IU/ML
QUANTIFERON TB PLUS TB1 MINUS NIL: 0.01 IU/ML
QUANTIFERON TB PLUS TB2 MINUS NIL: -0.01 IU/ML
RA DAS LEVEL QL VECTRADA: NORMAL
RESISTIN SERPL-MCNC: 3.3 NG/ML
RISK OF RADIOGRAPHIC PROGRESS: 5 %
SODIUM SERPL-SCNC: 139 MMOL/L
TNFRSF1A SERPL-MCNC: 1.1 NG/ML
VAP-1 SERPL-MCNC: 0.44 UG/ML
VECTRA SCORE: 40
VEGF-A SERPL-MCNC: 130 PG/ML
YKL-40 RESULT: 40 NG/ML

## 2024-01-15 RX ORDER — PANTOPRAZOLE 40 MG/1
40 TABLET, DELAYED RELEASE ORAL
Qty: 90 | Refills: 1 | Status: ACTIVE | COMMUNITY
Start: 2023-10-21 | End: 1900-01-01

## 2024-01-24 NOTE — ED ADULT NURSE NOTE - CHIEF COMPLAINT QUOTE
Cough PT C/O LEFT SHOULDER PAIN/ NECK PAIN  UNABLE TO TURN HEAD, BACK PAIN. PT STATES "SHE HAS INFUSION SHOTS FOR HER ARTHRITIS." DENIES CP/SOB/N/V/D/FEVER/CHILLS. EKG IN TRIAGE. pmh: ARTHRITIS.

## 2024-01-25 RX ORDER — CETIRIZINE HYDROCHLORIDE 10 MG/1
10 TABLET, COATED ORAL
Refills: 0 | Status: ACTIVE | OUTPATIENT
Start: 2024-01-25 | End: 1900-01-01

## 2024-01-25 RX ORDER — ACETAMINOPHEN 325 MG/1
325 TABLET ORAL
Refills: 0 | Status: ACTIVE | OUTPATIENT
Start: 2024-01-25 | End: 1900-01-01

## 2024-01-27 ENCOUNTER — RX RENEWAL (OUTPATIENT)
Age: 59
End: 2024-01-27

## 2024-01-31 ENCOUNTER — APPOINTMENT (OUTPATIENT)
Dept: RHEUMATOLOGY | Facility: CLINIC | Age: 59
End: 2024-01-31

## 2024-02-01 ENCOUNTER — RX RENEWAL (OUTPATIENT)
Age: 59
End: 2024-02-01

## 2024-02-06 ENCOUNTER — APPOINTMENT (OUTPATIENT)
Dept: RHEUMATOLOGY | Facility: CLINIC | Age: 59
End: 2024-02-06
Payer: COMMERCIAL

## 2024-02-06 VITALS
HEART RATE: 77 BPM | DIASTOLIC BLOOD PRESSURE: 60 MMHG | RESPIRATION RATE: 18 BRPM | SYSTOLIC BLOOD PRESSURE: 108 MMHG | OXYGEN SATURATION: 96 % | TEMPERATURE: 97 F

## 2024-02-06 PROCEDURE — 96374 THER/PROPH/DIAG INJ IV PUSH: CPT | Mod: 59

## 2024-02-06 PROCEDURE — 96413 CHEMO IV INFUSION 1 HR: CPT

## 2024-02-06 RX ORDER — ACETAMINOPHEN 325 MG/1
325 TABLET ORAL
Qty: 0 | Refills: 0 | Status: COMPLETED
Start: 2024-01-25

## 2024-02-06 RX ORDER — CETIRIZINE HYDROCHLORIDE 10 MG/1
10 TABLET, COATED ORAL
Qty: 0 | Refills: 0 | Status: COMPLETED
Start: 2024-01-25

## 2024-02-06 RX ORDER — METHYLPREDNISOLONE 40 MG/ML
40 INJECTION, POWDER, LYOPHILIZED, FOR SOLUTION INTRAMUSCULAR; INTRAVENOUS
Qty: 1 | Refills: 0 | Status: COMPLETED
Start: 2023-12-26

## 2024-02-06 RX ORDER — ABATACEPT 250 MG/15ML
250 INJECTION, POWDER, LYOPHILIZED, FOR SOLUTION INTRAVENOUS
Qty: 0 | Refills: 0 | Status: COMPLETED | OUTPATIENT
Start: 2024-01-25

## 2024-02-06 NOTE — HISTORY OF PRESENT ILLNESS
[Denies] : Denies [No] : No [Yes] : Yes [Declined] : Declined [Informed consent documented in EHR.] : Informed consent documented in EHR. [TB] : Tuberculosis screening [Hep acute panel] : Hepatitis acute panel [de-identified] : Reports pain to left shoulder and right side of neck.  [Left upper extremity] : Left upper extremity [24g] : 24g [Start Time: ___] : Medication Start Time: [unfilled] [End Time: ___] : Medication End Time: [unfilled] [Medication Name: ___] : Medication Name: [unfilled] [Total Amount Administered: ___] : Total Amount Administered: [unfilled] [IV discontinued. Intact. No signs or symptoms of IV complications noted. Time: ___] : IV discontinued. Intact. No signs or symptoms of IV complications noted. Time: [unfilled] [Patient  instructed to seek medical attention with signs and symptoms of adverse effects] : Patient  instructed to seek medical attention with signs and symptoms of adverse effects [Patient left unit in no acute distress] : Patient left unit in no acute distress [Medications administered as ordered and tolerated well.] : Medications administered as ordered and tolerated well. [de-identified] : 04:00 pm

## 2024-02-15 ENCOUNTER — APPOINTMENT (OUTPATIENT)
Dept: RHEUMATOLOGY | Facility: CLINIC | Age: 59
End: 2024-02-15
Payer: COMMERCIAL

## 2024-02-15 VITALS
WEIGHT: 120 LBS | TEMPERATURE: 98 F | HEART RATE: 83 BPM | HEIGHT: 57 IN | DIASTOLIC BLOOD PRESSURE: 60 MMHG | OXYGEN SATURATION: 98 % | SYSTOLIC BLOOD PRESSURE: 110 MMHG | BODY MASS INDEX: 25.89 KG/M2

## 2024-02-15 DIAGNOSIS — R06.09 OTHER FORMS OF DYSPNEA: ICD-10-CM

## 2024-02-15 PROCEDURE — 99215 OFFICE O/P EST HI 40 MIN: CPT

## 2024-02-15 PROCEDURE — G2211 COMPLEX E/M VISIT ADD ON: CPT

## 2024-02-15 RX ORDER — CALCIUM CARBONATE/VITAMIN D3 600 MG-10
600-400 TABLET ORAL
Qty: 60 | Refills: 0 | Status: ACTIVE | COMMUNITY
Start: 2022-03-17

## 2024-02-15 RX ORDER — PILOCARPINE HYDROCHLORIDE 5 MG/1
5 TABLET, FILM COATED ORAL
Qty: 90 | Refills: 0 | Status: ACTIVE | COMMUNITY
Start: 2024-02-15 | End: 1900-01-01

## 2024-02-15 RX ORDER — METHOTREXATE 2.5 MG/1
2.5 TABLET ORAL
Qty: 86 | Refills: 1 | Status: DISCONTINUED | COMMUNITY
Start: 2023-04-07 | End: 2024-02-15

## 2024-02-19 NOTE — REVIEW OF SYSTEMS
[Dry Eyes] : dryness of the eyes [Loss Of Hearing] : hearing loss [Arthralgias] : arthralgias [Joint Pain] : joint pain [Joint Swelling] : joint swelling [Joint Stiffness] : joint stiffness [Sleep Disturbances] : sleep disturbances [Anxiety] : anxiety [Negative] : Heme/Lymph [SOB on Exertion] : shortness of breath during exertion [As Noted in HPI] : as noted in HPI [Fever] : no fever [Chills] : no chills [Feeling Poorly] : not feeling poorly [Feeling Tired] : not feeling tired [Eye Pain] : no eye pain [Red Eyes] : eyes not red [Eyesight Problems] : no eyesight problems [Chest Pain] : no chest pain [Palpitations] : no palpitations [Lower Ext Edema] : no lower extremity edema [Shortness Of Breath] : no shortness of breath [Wheezing] : no wheezing [Cough] : no cough [Abdominal Pain] : no abdominal pain [Vomiting] : no vomiting [Constipation] : no constipation [Diarrhea] : no diarrhea [Skin Lesions] : no skin lesions [Skin Wound] : no skin wound [Itching] : no itching [Confused] : no confusion [Limb Weakness] : no limb weakness [Difficulty Walking] : no difficulty walking [Swollen Glands] : no swollen glands [Swollen Glands In The Neck] : no swollen glands in the neck [FreeTextEntry2] : Now 6 lb weightloss since 08/23 <--c/o wt loss due to change in appetite...reports 8 lbs but only down 1lb since 8/2023 [FreeTextEntry3] : No previous hx IED or issues w/ corneal abrasions  [FreeTextEntry4] : Dry mouth.- dysphagia- Unknown origin of Left sided hearing loss w/ chronic tinnitus- stable  [FreeTextEntry7] : GI distress with MTX SQ.. especially when injecting into abd - not reported today [FreeTextEntry9] : pain throughout bl hands, shoulders, knees, and back   [de-identified] : no recent psoriasis episodes (only 1  episode previously) and still  [de-identified] : Increasing anxiety w/ new onset symptoms potentially r/t RA  <-- about treatments, pain is waking her up from sleep

## 2024-02-19 NOTE — ASSESSMENT
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  Presents today for routine follow-up and steroid inj response  MUST USE  AT ALL VISITS TO HELP W/ PATIENT COMPLIANCE  1) RA seropositive: though pattern is in SpA.. with recurrent plantar fascitis and asymmetrical presentation and possible psoriasis - patch on L ankle/ possible scalp lesion.. CCP > 250 w/ Vectra 12/22 of 52 excellent and full response to pred at 20 mg daily.. feels great on high dose steroids, always. Eventually started on Simponi Aria 1/23- with some but not full response despite loading dose w/ steroids. Still with active synovitis after several months.. added  mg (200 mg is 3.5 mg/kg for 57 kilograms) well tolerated but not effective...after much encouragement started MTX in April 2023 (4 pills/week) for persistent synovitis in R ankle fullness, R plantar fasciites, cervical pain. Titrated up to 8 pills/week in May 2023 but due to compliance vs language barrier vs miscommunication, patient not fully compliant with MTX and thus had flares through Simponi Aria with elevated ESR/CRP. Stopped HCQ- "too many medicines" and inadequate response. - Over past several months has been c/w MTX at 20 mg wkly and switched to mnthly Orencia.  Has overall been doing much better with Next infusion due 3/6/24.. NO active psoriasis now... and still. Will continue to follow closely every 2 m.. Orencia- first infusion (8/22/23) with monthly infusions since (excellent compliance) and c/w MTX 20 mg wkly / off all oral steroids.. was doing very well till recently- but TODAY.. also with marked increase in sicca syndrome increasing dry eyes and mouth (particularly disruptive, decrease in appetite and slow weightloss r/t globus sensation and reduced appetite) along with some mild return of b/l shoulder and knee pain.  Additionally..  MO - Recent onset difficulty climbing stairs. Currently w/u w/ pulmonology Dr. Elmore. Abn PFTs, rx albuterol PRN - has not started using. Needs to schedule chest CT for further evaluation. Working dx bronchiolitis/asthma r/t partially controlled RA. Labs 01/03/24: downtrending Vectra 40, uptrending ESR 84. NOTE: Baseline Hand/ SI joint xrays 3/22 negative for erosions, no deformities / and neg HLAB27 Initial presentation + Achilles tendonitis + plantar fasciitis, R wrist and MCP/ MTP inflammation -Simponi Aria infusion 1/2023-5/2023 with no control in synovitis --> will not trial any other TNFi or biosimiliars -HCQ 1/2023-4/2023 well tolerated but stopped taking and did not control synovitis -MTX up to 20 mg well tolerated PO, trial SQ for compliance purposes and efficacy but did not continue.. couldn't tolerate Self injection In future if needed: would consider Actemra/ or Atiya (low cardiac risk)  Discussion:  patient frustrated with worsening sicca and return of joint pain, though admittedly much better.  Diff dx: -Partially controlled RA: Increasing arthralgias, Profound dry eye, dry mouth, RA r/t bronchiolitis/asthma -SS: Profound dry eye, dry mouth. No recent ophthalmology exam, no SS ab previously assessed- update now.. certainly sicca can be present in RA with/ or without SS antibodies.. -SLE: Dyspnea, sicca sx. Previously neg COLLINS, YAMIL, DSDNA, nl C3/C4 -IGG4: can result in SS-like sx, +ANCA (increasing evidence positivity in some IGG4-RD), dyspnea, and epigastric pain. No previous IGG panel Labs and imaging ordered for further evaluation now.. updated rheum studies  If review labs/imaging do not suggest new AI dx will need to change RA management consider restarting HCQ to address sicca symptoms/arthralgias, previously well tolerated. Start Pilocarpine PRN for sicca sx, and continue current RA regimen  2) Compliance: still unclear language barrier, reports understanding and speaks good English but following directions not always consistent. Concerned about degree of active joint inflammation and repeated use of steroid for control. Ideally with infusion tx should be better, RN team for support... reports compliance Need to provide all literature in [place] For all future appts must use - STILL -Will continue to reassess at each visit  3) Osteoporosis: DEXA 1/19/23 w/ most severe T score -2.5... no comparison (different machine) but previously -2.3 at this level... FN -1.9 previously 0.8 and TH -1.4 previously 0.7. No previous treatment or known fractures. RIsks: long term steroids > 3 m (off now), age, RA. Will need treatment but will not address till stable control RA still not achieved..(too overwhelmed to discuss at this time again today) -PCP suggested Prolia, only mild osteoporosis, guidelines suggest starting with BP.. , could be a candidate for biphosphate but if compliance or language barrier a concern, could do Reclast  4) Unintentional wt loss: fully resolved over past 6 m.. stable BMI 27 --> now BMI 25 slow weightloss since 08/23 down 5-6 lb likely related to increasing dry mouth and reduced appetite Wt had previously stabilized with consistent use of MTX and Prednisone (previously severe GI distress with  7 lbs wt loss in 3 weeks) , BMI 25 --> 27-> 28  Plan:  -Referral for ophthalmology eye exam, evaluate for inflammatory eye disease and dry eye related to Rheumatoid Arthritis  -Continue MTX 20mg weekly inj and Orencia mnthly infusion  -Recommended Chest Xray for immediate evaluation and aid auth process for CT scan ordered by pulm.  -Complete CT scan ordered by pulmonology. Continue evaluation and management with pulmonology Dr. Elmore.  -Pilocarpine 5mg tablets take 1-2 tablets TID  as needed to help with dry mouth  -Dentist for evaluation dry mouth with routinely cleaning recommended ideally q 3 m- but minimum q 6 m  -Scripts labs given today complete at [facility] before next follow-up appointment  -Do not take any prednisone (call office for any severe flares)  -Follow-up in office visit on 03/06/24 on infusion day  Patient was seen and evaluated by Yasmeen Macias DNP (training in rheumatology) and with MATILDE Diaz I agree with full evaluation and plan as described above.

## 2024-02-19 NOTE — CONSULT LETTER
[Dear  ___] : Dear  [unfilled], [Courtesy Letter:] : I had the pleasure of seeing your patient, [unfilled], in my office today. [Referral Closing:] : Thank you very much for seeing this patient.  If you have any questions, please do not hesitate to contact me. 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  Presents today for routine follow-up and steroid inj response  MUST USE  AT ALL VISITS TO HELP W/ PATIENT COMPLIANCE  1) RA seropositive: though pattern is in SpA.. with recurrent plantar fascitis and asymmetrical presentation and possible psoriasis - patch on L ankle/ possible scalp lesion.. CCP > 250 w/ Vectra 12/22 of 52 excellent and full response to pred at 20 mg daily.. feels great on high dose steroids, always. Eventually started on Simponi Aria 1/23- with some but not full response despite loading dose w/ steroids. Still with active synovitis after several months.. added  mg (200 mg is 3.5 mg/kg for 57 kilograms) well tolerated but not effective...after much encouragement started MTX in April 2023 (4 pills/week) for persistent synovitis in R ankle fullness, R plantar fasciites, cervical pain. Titrated up to 8 pills/week in May 2023 but due to compliance vs language barrier vs miscommunication, patient not fully compliant with MTX and thus had flares through Simponi Aria with elevated ESR/CRP. Stopped HCQ- "too many medicines" and inadequate response. - Over past several months has been c/w MTX at 20 mg wkly and switched to mnthly Orencia.  Has overall been doing much better with Next infusion due 3/6/24.. NO active psoriasis now... and still. Will continue to follow closely every 2 m.. Orencia- first infusion (8/22/23) with monthly infusions since (excellent compliance) and c/w MTX 20 mg wkly / off all oral steroids.. was doing very well till recently- but TODAY.. also with marked increase in sicca syndrome increasing dry eyes and mouth (particularly disruptive, decrease in appetite and slow weightloss r/t globus sensation and reduced appetite) along with some mild return of b/l shoulder and knee pain.  Additionally..  MO - Recent onset difficulty climbing stairs. Currently w/u w/ pulmonology Dr. Elmore. Abn PFTs, rx albuterol PRN - has not started using. Needs to schedule chest CT for further evaluation. Working dx bronchiolitis/asthma r/t partially controlled RA. Labs 01/03/24: downtrending Vectra 40, uptrending ESR 84. NOTE: Baseline Hand/ SI joint xrays 3/22 negative for erosions, no deformities / and neg HLAB27 Initial presentation + Achilles tendonitis + plantar fasciitis, R wrist and MCP/ MTP inflammation -Simponi Aria infusion 1/2023-5/2023 with no control in synovitis --> will not trial any other TNFi or biosimiliars -HCQ 1/2023-4/2023 well tolerated but stopped taking and did not control synovitis -MTX up to 20 mg well tolerated PO, trial SQ for compliance purposes and efficacy but did not continue.. couldn't tolerate Self injection In future if needed: would consider Actemra/ or Atiya (low cardiac risk)  Discussion:  patient frustrated with worsening sicca and return of joint pain, though admittedly much better.  Diff dx: -Partially controlled RA: Increasing arthralgias, Profound dry eye, dry mouth, RA r/t bronchiolitis/asthma -SS: Profound dry eye, dry mouth. No recent ophthalmology exam, no SS ab previously assessed- update now.. certainly sicca can be present in RA with/ or without SS antibodies.. -SLE: Dyspnea, sicca sx. Previously neg COLLINS, YAMIL, DSDNA, nl C3/C4 -IGG4: can result in SS-like sx, +ANCA (increasing evidence positivity in some IGG4-RD), dyspnea, and epigastric pain. No previous IGG panel Labs and imaging ordered for further evaluation now.. updated rheum studies  If review labs/imaging do not suggest new AI dx will need to change RA management consider restarting HCQ to address sicca symptoms/arthralgias, previously well tolerated. Start Pilocarpine PRN for sicca sx, and continue current RA regimen  2) Compliance: still unclear language barrier, reports understanding and speaks good English but following directions not always consistent. Concerned about degree of active joint inflammation and repeated use of steroid for control. Ideally with infusion tx should be better, RN team for support... reports compliance Need to provide all literature in [place] For all future appts must use - STILL -Will continue to reassess at each visit  3) Osteoporosis: DEXA 1/19/23 w/ most severe T score -2.5... no comparison (different machine) but previously -2.3 at this level... FN -1.9 previously 0.8 and TH -1.4 previously 0.7. No previous treatment or known fractures. RIsks: long term steroids > 3 m (off now), age, RA. Will need treatment but will not address till stable control RA still not achieved..(too overwhelmed to discuss at this time again today) -PCP suggested Prolia, only mild osteoporosis, guidelines suggest starting with BP.. , could be a candidate for biphosphate but if compliance or language barrier a concern, could do Reclast  4) Unintentional wt loss: fully resolved over past 6 m.. stable BMI 27 --> now BMI 25 slow weightloss since 08/23 down 5-6 lb likely related to increasing dry mouth and reduced appetite Wt had previously stabilized with consistent use of MTX and Prednisone (previously severe GI distress with  7 lbs wt loss in 3 weeks) , BMI 25 --> 27-> 28  Plan:  -Referral for ophthalmology eye exam, evaluate for inflammatory eye disease and dry eye related to Rheumatoid Arthritis  -Continue MTX 20mg weekly inj and Orencia mnthly infusion  -Recommended Chest Xray for immediate evaluation and aid auth process for CT scan ordered by pulm.  -Complete CT scan ordered by pulmonology. Continue evaluation and management with pulmonology Dr. Elmore.  -Pilocarpine 5mg tablets take 1-2 tablets TID  as needed to help with dry mouth  -Dentist for evaluation dry mouth with routinely cleaning recommended ideally q 3 m- but minimum q 6 m  -Scripts labs given today complete at [facility] lab before next follow-up appointment  -Do not take any prednisone (call office for any severe flares)  -Follow-up in office visit on 03/06/24 on infusion day  Patient was seen and evaluated by Yasmeen Macias DNP (training in rheumatology) and with MATILDE Diaz I agree with full evaluation and plan as described above. [DrNilay  ___] : Dr. COLÓN [FreeTextEntry3] : Sheela Diaz DNP, [facility] Division or Rheumatology [facility]

## 2024-02-19 NOTE — PHYSICAL EXAM
[General Appearance - Alert] : alert [General Appearance - In No Acute Distress] : in no acute distress [General Appearance - Well Nourished] : well nourished [General Appearance - Well Developed] : well developed [General Appearance - Well-Appearing] : healthy appearing [Sclera] : the sclera and conjunctiva were normal [Oropharynx] : the oropharynx was normal [Neck Appearance] : the appearance of the neck was normal [Respiration, Rhythm And Depth] : normal respiratory rhythm and effort [Exaggerated Use Of Accessory Muscles For Inspiration] : no accessory muscle use [Auscultation Breath Sounds / Voice Sounds] : lungs were clear to auscultation bilaterally [Heart Rate And Rhythm] : heart rate was normal and rhythm regular [Heart Sounds] : normal S1 and S2 [Heart Sounds Gallop] : no gallops [Murmurs] : no murmurs [Heart Sounds Pericardial Friction Rub] : no pericardial rub [Edema] : there was no peripheral edema [No CVA Tenderness] : no ~M costovertebral angle tenderness [Abnormal Walk] : normal gait [Nail Clubbing] : no clubbing  or cyanosis of the fingernails [Involuntary Movements] : no involuntary movements were seen [Motor Tone] : muscle strength and tone were normal [Skin Color & Pigmentation] : normal skin color and pigmentation [Skin Turgor] : normal skin turgor [] : no rash [Skin Lesions] : no skin lesions [No Focal Deficits] : no focal deficits [Oriented To Time, Place, And Person] : oriented to person, place, and time [Impaired Insight] : insight and judgment were intact [Affect] : the affect was normal [Mood] : the mood was normal [Extraocular Movements] : extraocular movements were intact [Neck Cervical Mass (___cm)] : no neck mass was observed [Thyroid Diffuse Enlargement] : the thyroid was not enlarged [Thyroid Nodule] : there were no palpable thyroid nodules [FreeTextEntry1] : Unclear understanding of medical information, compliance fair over past year --> MUST USE

## 2024-02-19 NOTE — REASON FOR VISIT
[Follow-Up: _____] : a [unfilled] follow-up visit [[employer] ] : provided by [employer]   [Interpreters_IDNumber] : [id number] [FreeTextEntry3] : [place]inese

## 2024-02-19 NOTE — HISTORY OF PRESENT ILLNESS
[FreeTextEntry1] : MUST USE  AT ALL VISITS TO HELP W/ PATIENT COMPLIANCE  2/15/2024 -No prednisone since last visit, Currently taking MTX 8 weekly , folic acid, Orencia q4wks- tolerating all these fairly well with no SE previous GI SE with MTX / Tox -Increase b/l shoulder, back and knee pain but no obvious joint swelling  -No active psoriasis at this time -Not taking any muscle relaxer still able to sleep 7 hours daily wakes up from hand numbness -Dry mouth increasing over last few months w/o oral sores, difficulty swallowing w/o any episodes of choking. Reduced appetite 2/2 globus sensation, 3 lb weightloss since last visit. Dry eyes as well unable to produce tears for several months. Has not had recent ophthalmology exam. -No known issues with thyroid -Episode MO difficulty going up the stairs. Evaluated by Dr. Holger Elmore had abn PFTs, referred for chest CT. Rx Albuterol inhaler PRN has not picked up yet for use. Working dx Bronchiolitis/asthma r/t RA  Annual exam next week PCP: Dr. Hiren Eagle Labs 01/03/24: Vectra 40, ESR 84. Neg/nl: Hep, QTB, CBC, CMP, CRP  - ROS otherwise ok    1) Inflammatory polyarthropathy:  CCP + RA (newly dx 3/22)  2) Osteoporosis  ______________________________________________________________________________   (Initial HPI  3/2/22)  58 yo female coming in for consult for long-standing hx of polyarthropathy.   - 2 years of joint pain and swelling, worsens with raining involving R wrist, R elbow, MCPs bilateral R > L, bilateral shoulders, bilateral knees, R ankle.  - Morning stiffness for 10 minutes approx.  - + Back pain worse in the AM, lower. Feels better in 5 minutes.  - Takes OTC Tylenol/Motrin/Aleve with minimal improvement.  - Ankle swelling has been assessed with XR, MRI - reportedly nothing remarkable.  - Plantar fasciitis bilateral dx, has tried steroid injection in bilateral feet with some improvement, but couldn't pursue this option long-term.  - Dx with arthritis, Ag - labs done recently - was told arthritis might be of rheum. origin - Dx with psoriasis a few years ago, hydrocortisone cream improved rash, was on left anterior ankle/distal leg     - Hx of hearing loss (not complete) in left ear - unknown origin. (took high dose of steroids without improvement in the past)  - Denies trigger finger, swelling in fingers, toe swelling, vision changes, diarrhea, abd pain, red eyes, night sweats, lymphadenopathy, chest pain, SOB, pleurisy.   Social:  - P 3 G 3 (oldest 35 y.o), , lives alone.  - Non-smoker, social drinker   FH + history of arthritis (aunt), no family hx of psoriasis

## 2024-02-21 ENCOUNTER — NON-APPOINTMENT (OUTPATIENT)
Age: 59
End: 2024-02-21

## 2024-02-29 ENCOUNTER — APPOINTMENT (OUTPATIENT)
Dept: OTOLARYNGOLOGY | Facility: CLINIC | Age: 59
End: 2024-02-29

## 2024-03-06 ENCOUNTER — APPOINTMENT (OUTPATIENT)
Dept: RHEUMATOLOGY | Facility: CLINIC | Age: 59
End: 2024-03-06

## 2024-03-18 ENCOUNTER — MED ADMIN CHARGE (OUTPATIENT)
Age: 59
End: 2024-03-18

## 2024-03-18 ENCOUNTER — APPOINTMENT (OUTPATIENT)
Dept: RHEUMATOLOGY | Facility: CLINIC | Age: 59
End: 2024-03-18
Payer: COMMERCIAL

## 2024-03-18 VITALS
DIASTOLIC BLOOD PRESSURE: 62 MMHG | TEMPERATURE: 96.9 F | RESPIRATION RATE: 18 BRPM | OXYGEN SATURATION: 97 % | HEART RATE: 69 BPM | SYSTOLIC BLOOD PRESSURE: 100 MMHG

## 2024-03-18 VITALS
HEART RATE: 71 BPM | SYSTOLIC BLOOD PRESSURE: 103 MMHG | RESPIRATION RATE: 17 BRPM | DIASTOLIC BLOOD PRESSURE: 71 MMHG | OXYGEN SATURATION: 97 % | TEMPERATURE: 98 F

## 2024-03-18 PROCEDURE — 96413 CHEMO IV INFUSION 1 HR: CPT

## 2024-03-18 PROCEDURE — 96374 THER/PROPH/DIAG INJ IV PUSH: CPT | Mod: 59

## 2024-03-18 RX ORDER — ABATACEPT 250 MG/15ML
250 INJECTION, POWDER, LYOPHILIZED, FOR SOLUTION INTRAVENOUS
Qty: 0 | Refills: 0 | Status: COMPLETED | OUTPATIENT
Start: 2023-12-28

## 2024-03-18 RX ORDER — ACETAMINOPHEN 325 MG/1
325 TABLET ORAL
Qty: 0 | Refills: 0 | Status: COMPLETED
Start: 2024-01-25

## 2024-03-18 RX ORDER — CETIRIZINE HYDROCHLORIDE 10 MG/1
10 TABLET, COATED ORAL
Qty: 0 | Refills: 0 | Status: COMPLETED
Start: 2024-01-25

## 2024-03-18 RX ORDER — METHYLPREDNISOLONE 40 MG/ML
40 INJECTION, POWDER, LYOPHILIZED, FOR SOLUTION INTRAMUSCULAR; INTRAVENOUS
Qty: 1 | Refills: 0 | Status: COMPLETED
Start: 2023-12-26

## 2024-03-18 NOTE — HISTORY OF PRESENT ILLNESS
[Denies] : Denies [No] : No [Yes] : Yes [Declined] : Declined [TB] : Tuberculosis screening [Hep acute panel] : Hepatitis acute panel [de-identified] : Reports mild pain to knuckles bilaterally. No swelling noted.  [Left upper extremity] : Left upper extremity [24g] : 24g [Start Time: ___] : Medication Start Time: [unfilled] [End Time: ___] : Medication End Time: [unfilled] [Medication Name: ___] : Medication Name: [unfilled] [IV discontinued. Intact. No signs or symptoms of IV complications noted. Time: ___] : IV discontinued. Intact. No signs or symptoms of IV complications noted. Time: [unfilled] [Total Amount Administered: ___] : Total Amount Administered: [unfilled] [Patient  instructed to seek medical attention with signs and symptoms of adverse effects] : Patient  instructed to seek medical attention with signs and symptoms of adverse effects [Patient left unit in no acute distress] : Patient left unit in no acute distress [Medications administered as ordered and tolerated well.] : Medications administered as ordered and tolerated well. [de-identified] : 04:10 pm [de-identified] : Lab requisition given to patient and patinet instructed to get labs drawn as per DNP orders.

## 2024-04-04 ENCOUNTER — APPOINTMENT (OUTPATIENT)
Dept: OTOLARYNGOLOGY | Facility: CLINIC | Age: 59
End: 2024-04-04
Payer: COMMERCIAL

## 2024-04-04 VITALS — BODY MASS INDEX: 26.54 KG/M2 | HEIGHT: 57 IN | WEIGHT: 123 LBS

## 2024-04-04 DIAGNOSIS — H93.12 TINNITUS, LEFT EAR: ICD-10-CM

## 2024-04-04 DIAGNOSIS — H91.22 SUDDEN IDIOPATHIC HEARING LOSS, LEFT EAR: ICD-10-CM

## 2024-04-04 DIAGNOSIS — J31.0 CHRONIC RHINITIS: ICD-10-CM

## 2024-04-04 PROCEDURE — 31231 NASAL ENDOSCOPY DX: CPT

## 2024-04-04 PROCEDURE — 99204 OFFICE O/P NEW MOD 45 MIN: CPT | Mod: 25

## 2024-04-04 PROCEDURE — 92557 COMPREHENSIVE HEARING TEST: CPT

## 2024-04-04 PROCEDURE — 92567 TYMPANOMETRY: CPT

## 2024-04-04 RX ORDER — DOXYCYCLINE 100 MG/1
100 CAPSULE ORAL TWICE DAILY
Qty: 20 | Refills: 1 | Status: ACTIVE | COMMUNITY
Start: 2024-04-04 | End: 1900-01-01

## 2024-04-04 NOTE — REVIEW OF SYSTEMS
[Ear Noises] : ear noises [Patient Intake Form Reviewed] : Patient intake form was reviewed [Negative] : Ear [de-identified] : ringing

## 2024-04-04 NOTE — CONSULT LETTER
[Consult Letter:] : I had the pleasure of evaluating your patient, [unfilled]. [Please see my note below.] : Please see my note below. [Consult Closing:] : Thank you very much for allowing me to participate in the care of this patient.  If you have any questions, please do not hesitate to contact me. [Sincerely,] : Sincerely, [FreeTextEntry1] : Dear Dr. JACINTO MAIER,  Thank you for your kind referral. Please refer to my enclosed office notes for ALYSA MCCLENDON . If there are any questions free to contact me. [FreeTextEntry3] : Jasson Mobley MD, FACS

## 2024-04-04 NOTE — HISTORY OF PRESENT ILLNESS
[de-identified] : co noise left ear worse last 2 weeks hx low frequency loss episode room spinning  1 mo ago x several minutes no relapse neg hx migraine co excessive mucous at times yellow

## 2024-04-04 NOTE — ASSESSMENT
[FreeTextEntry1] : rhinitis sicca clinical signs sinusitis audio as nild loss as  no change form last audio loud tinnitus mri iac and to view  paranaswal sinuses doxy saline rinses

## 2024-04-09 ENCOUNTER — APPOINTMENT (OUTPATIENT)
Dept: MAMMOGRAPHY | Facility: CLINIC | Age: 59
End: 2024-04-09

## 2024-04-10 ENCOUNTER — LABORATORY RESULT (OUTPATIENT)
Age: 59
End: 2024-04-10

## 2024-04-10 LAB
ALBUMIN SERPL ELPH-MCNC: 3.8 G/DL
ALP BLD-CCNC: 91 U/L
ALT SERPL-CCNC: 12 U/L
ANION GAP SERPL CALC-SCNC: 9 MMOL/L
AST SERPL-CCNC: 15 U/L
BASOPHILS # BLD AUTO: 0.01 K/UL
BASOPHILS NFR BLD AUTO: 0.2 %
BILIRUB SERPL-MCNC: 0.4 MG/DL
BUN SERPL-MCNC: 9 MG/DL
CALCIUM SERPL-MCNC: 9.2 MG/DL
CHLORIDE SERPL-SCNC: 107 MMOL/L
CO2 SERPL-SCNC: 24 MMOL/L
CREAT SERPL-MCNC: 0.71 MG/DL
CRP SERPL-MCNC: 36 MG/L
EGFR: 98 ML/MIN/1.73M2
EOSINOPHIL # BLD AUTO: 0.09 K/UL
EOSINOPHIL NFR BLD AUTO: 1.4 %
ERYTHROCYTE [SEDIMENTATION RATE] IN BLOOD BY WESTERGREN METHOD: 95 MM/HR
GLUCOSE SERPL-MCNC: 94 MG/DL
HCT VFR BLD CALC: 34.8 %
HGB BLD-MCNC: 10.8 G/DL
IMM GRANULOCYTES NFR BLD AUTO: 0.3 %
LYMPHOCYTES # BLD AUTO: 1.65 K/UL
LYMPHOCYTES NFR BLD AUTO: 25.5 %
MAN DIFF?: NORMAL
MCHC RBC-ENTMCNC: 25 PG
MCHC RBC-ENTMCNC: 31 GM/DL
MCV RBC AUTO: 80.6 FL
MONOCYTES # BLD AUTO: 0.43 K/UL
MONOCYTES NFR BLD AUTO: 6.6 %
NEUTROPHILS # BLD AUTO: 4.27 K/UL
NEUTROPHILS NFR BLD AUTO: 66 %
PLATELET # BLD AUTO: 413 K/UL
POTASSIUM SERPL-SCNC: 4.7 MMOL/L
PROT SERPL-MCNC: 6.6 G/DL
RBC # BLD: 4.32 M/UL
RBC # FLD: 13.5 %
SODIUM SERPL-SCNC: 140 MMOL/L
WBC # FLD AUTO: 6.47 K/UL

## 2024-04-11 LAB
C3 SERPL-MCNC: 162 MG/DL
C4 SERPL-MCNC: 38 MG/DL

## 2024-04-12 ENCOUNTER — APPOINTMENT (OUTPATIENT)
Dept: RHEUMATOLOGY | Facility: CLINIC | Age: 59
End: 2024-04-12
Payer: COMMERCIAL

## 2024-04-12 VITALS
TEMPERATURE: 97.6 F | BODY MASS INDEX: 26.54 KG/M2 | OXYGEN SATURATION: 96 % | HEART RATE: 86 BPM | HEIGHT: 57 IN | SYSTOLIC BLOOD PRESSURE: 110 MMHG | DIASTOLIC BLOOD PRESSURE: 66 MMHG | WEIGHT: 123 LBS

## 2024-04-12 DIAGNOSIS — J32.0 CHRONIC MAXILLARY SINUSITIS: ICD-10-CM

## 2024-04-12 DIAGNOSIS — H04.123 DRY MOUTH, UNSPECIFIED: ICD-10-CM

## 2024-04-12 DIAGNOSIS — M81.0 AGE-RELATED OSTEOPOROSIS W/OUT CURRENT PATHOLOGICAL FRACTURE: ICD-10-CM

## 2024-04-12 DIAGNOSIS — R68.2 DRY MOUTH, UNSPECIFIED: ICD-10-CM

## 2024-04-12 DIAGNOSIS — M06.9 RHEUMATOID ARTHRITIS, UNSPECIFIED: ICD-10-CM

## 2024-04-12 PROCEDURE — 99215 OFFICE O/P EST HI 40 MIN: CPT

## 2024-04-12 RX ORDER — CYCLOBENZAPRINE HYDROCHLORIDE 5 MG/1
5 TABLET, FILM COATED ORAL
Qty: 60 | Refills: 2 | Status: ACTIVE | COMMUNITY
Start: 2023-12-26 | End: 1900-01-01

## 2024-04-12 RX ORDER — LEFLUNOMIDE 10 MG/1
10 TABLET, FILM COATED ORAL DAILY
Qty: 30 | Refills: 0 | Status: ACTIVE | COMMUNITY
Start: 2024-04-12 | End: 1900-01-01

## 2024-04-12 RX ORDER — FOLIC ACID 1 MG/1
1 TABLET ORAL DAILY
Qty: 90 | Refills: 1 | Status: ACTIVE | COMMUNITY
Start: 2023-04-07 | End: 1900-01-01

## 2024-04-12 RX ORDER — METHOTREXATE 2.5 MG/1
2.5 TABLET ORAL
Qty: 86 | Refills: 1 | Status: DISCONTINUED | COMMUNITY
Start: 2024-02-01 | End: 2024-04-12

## 2024-04-12 RX ORDER — TRIAMCINOLONE ACETONIDE 1 MG/G
0.1 CREAM TOPICAL
Qty: 80 | Refills: 0 | Status: DISCONTINUED | COMMUNITY
Start: 2022-03-17 | End: 2024-04-12

## 2024-04-14 ENCOUNTER — NON-APPOINTMENT (OUTPATIENT)
Age: 59
End: 2024-04-14

## 2024-04-14 PROBLEM — R68.2 DRY MOUTH AND EYES: Status: ACTIVE | Noted: 2024-02-15

## 2024-04-14 PROBLEM — M06.9 RHEUMATOID ARTHRITIS: Status: ACTIVE | Noted: 2022-03-30

## 2024-04-14 PROBLEM — J32.0 CHRONIC MAXILLARY SINUSITIS: Status: ACTIVE | Noted: 2024-04-04

## 2024-04-14 PROBLEM — M81.0 OSTEOPOROSIS OF MULTIPLE SITES: Status: ACTIVE | Noted: 2023-02-28

## 2024-04-15 ENCOUNTER — MED ADMIN CHARGE (OUTPATIENT)
Age: 59
End: 2024-04-15

## 2024-04-15 ENCOUNTER — APPOINTMENT (OUTPATIENT)
Dept: RHEUMATOLOGY | Facility: CLINIC | Age: 59
End: 2024-04-15
Payer: COMMERCIAL

## 2024-04-15 VITALS
DIASTOLIC BLOOD PRESSURE: 63 MMHG | SYSTOLIC BLOOD PRESSURE: 95 MMHG | RESPIRATION RATE: 18 BRPM | HEART RATE: 85 BPM | OXYGEN SATURATION: 96 %

## 2024-04-15 VITALS — SYSTOLIC BLOOD PRESSURE: 110 MMHG | RESPIRATION RATE: 18 BRPM | DIASTOLIC BLOOD PRESSURE: 72 MMHG | HEART RATE: 92 BPM

## 2024-04-15 LAB
ANTI-BETA2 GLYCOPROTEIN 1 IGG CONCENTRATION: 23 U/ML
ANTI-BETA2 GLYCOPROTEIN 1 IGM CONCENTRATION: 4 U/ML
ANTI-CARDIOLIPIN IGG CONCENTRATION: 5 GPL
ANTI-CARDIOLIPIN IGM CONCENTRATION: <0.9 MPL
ANTI-CENP IGG CONCENTRATION: <0.4 U/ML
ANTI-CYCLIC CITRULLINATED PEPTIDE IGG CONCENTRATION: >340 U/ML
ANTI-DOUBLE-STRANDED DNA IGG CONCENTRATION: 14 IU/ML
ANTI-JO-1 IGG CONCENTRATION: <0.3 U/ML
ANTI-NUCLEAR ANTIBODIES - CYTOPLASMIC PATTERN: NORMAL
ANTI-NUCLEAR ANTIBODIES - PRIMARY NUCLEAR PATTERN: NORMAL
ANTI-NUCLEAR ANTIBODIES - PRIMARY PATTERN TITER: ABNORMAL
ANTI-NUCLEAR ANTIBODIES IGG CONCENTRATION: 8 UNITS
ANTI-RNA POL III IGG CONCENTRATION: <0.7 U/ML
ANTI-RNP70 IGG CONCENTRATION: 2 U/ML
ANTI-RO52 IGG CONCENTRATION: <0.3 U/ML
ANTI-RO60 IGG CONCENTRATION: <0.4 U/ML
ANTI-SCL-70 IGG CONCENTRATION: <0.6 U/ML
ANTI-SMITH IGG CONCENTRATION: <0.7 U/ML
ANTI-SS-B (LA) IGG CONCENTRATION: <0.4 U/ML
ANTI-THYROGLOBULIN IGG CONCENTRATION: <12 IU/ML
ANTI-THYROID PEROXIDASE IGG CONCENTRATION: 5 IU/ML
ANTI-U1RNP IGG CONCENTRATION: 1 U/ML
AVISE LUPUS INDEX: -4
AVISE LUPUS RESULT: NEGATIVE
B-LYMPHOCYTE-BOUND C4D (BC4D) LEVEL: 20
ERYTHROCYTE-BOUND C4D (EC4D) LEVEL: 8
IL6 SERPL-MCNC: 17.6 PG/ML
RHEUMATOID FACTOR (IGA) CONCENTRATION: 49 IU/ML
RHEUMATOID FACTOR (IGM) CONCENTRATION: 80 IU/ML

## 2024-04-15 PROCEDURE — 96374 THER/PROPH/DIAG INJ IV PUSH: CPT | Mod: 59

## 2024-04-15 PROCEDURE — 96413 CHEMO IV INFUSION 1 HR: CPT

## 2024-04-15 RX ORDER — ABATACEPT 250 MG/15ML
250 INJECTION, POWDER, LYOPHILIZED, FOR SOLUTION INTRAVENOUS
Qty: 0 | Refills: 0 | Status: COMPLETED | OUTPATIENT
Start: 2024-02-22

## 2024-04-15 RX ORDER — CETIRIZINE HYDROCHLORIDE 10 MG/1
10 TABLET, COATED ORAL
Qty: 0 | Refills: 0 | Status: COMPLETED
Start: 2024-01-25

## 2024-04-15 RX ORDER — ACETAMINOPHEN 325 MG/1
325 TABLET ORAL
Qty: 0 | Refills: 0 | Status: COMPLETED
Start: 2024-01-25

## 2024-04-17 RX ORDER — METHYLPREDNISOLONE 40 MG/ML
40 INJECTION, POWDER, LYOPHILIZED, FOR SOLUTION INTRAMUSCULAR; INTRAVENOUS
Qty: 1 | Refills: 0 | Status: COMPLETED
Start: 2023-12-26

## 2024-04-17 NOTE — HISTORY OF PRESENT ILLNESS
[Denies] : Denies [No] : No [Yes] : Yes [Declined] : Declined [TB] : Tuberculosis screening [Hep acute panel] : Hepatitis acute panel [Right upper extremity] : Right upper extremity [24g] : 24g [Start Time: ___] : Medication Start Time: [unfilled] [End Time: ___] : Medication End Time: [unfilled] [Medication Name: ___] : Medication Name: [unfilled] [Total Amount Administered: ___] : Total Amount Administered: [unfilled] [IV discontinued. Intact. No signs or symptoms of IV complications noted. Time: ___] : IV discontinued. Intact. No signs or symptoms of IV complications noted. Time: [unfilled] [Patient  instructed to seek medical attention with signs and symptoms of adverse effects] : Patient  instructed to seek medical attention with signs and symptoms of adverse effects [Patient left unit in no acute distress] : Patient left unit in no acute distress [Medications administered as ordered and tolerated well.] : Medications administered as ordered and tolerated well. [de-identified] : Reports mild pain to Left knee. [de-identified] : 04:00 pm

## 2024-04-20 NOTE — CONSULT LETTER
[Dear  ___] : Dear  [unfilled], [Courtesy Letter:] : I had the pleasure of seeing your patient, [unfilled], in my office today. [Referral Closing:] : Thank you very much for seeing this patient.  If you have any questions, please do not hesitate to contact me. [Sincerely,] : Sincerely, [DrNilay  ___] : Dr. 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  Presents today for routine follow-up and steroid inj response  MUST USE  AT ALL VISITS TO HELP W/ PATIENT COMPLIANCE  1) RA seropositive: though pattern is in SpA.. with recurrent plantar fascitis and asymmetrical presentation and possible psoriasis - patch on L ankle/ possible scalp lesion.. CCP > 250 w/ Vectra 12/22 of 52 excellent and full response to pred at 20 mg daily.. feels great on high dose steroids, always. Eventually started on Simponi Aria 1/23- with some but not full response despite loading dose w/ steroids. Still with active synovitis after several months.. added  mg (200 mg is 3.5 mg/kg for 57 kilograms) well tolerated but not effective...after much encouragement started MTX in April 2023 (4 pills/week) for persistent synovitis in R ankle fullness, R plantar fasciites, cervical pain. Titrated up to 8 pills/week in May 2023 but due to compliance vs language barrier vs miscommunication, patient not fully compliant with MTX and thus had flares through Simponi Aria with elevated ESR/CRP. Stopped HCQ- "too many medicines" and inadequate response. - Over past several months has been c/w MTX at 20 mg wkly and switched to mnthly Orencia.  Has overall been doing much better with Next infusion due 3/6/24.. NO active psoriasis now... and still. Will continue to follow closely every 2 m.. Orencia- first infusion (8/22/23) with monthly infusions since (excellent compliance) and c/w MTX 20 mg wkly / off all oral steroids.. was doing very well till recently- but TODAY.. also with marked increase in sicca syndrome increasing dry eyes and mouth (particularly disruptive, decrease in appetite and slow weightloss r/t globus sensation and reduced appetite) along with some mild return of b/l shoulder and knee pain.  Additionally..  MO - Recent onset difficulty climbing stairs. Currently w/u w/ pulmonology Dr. Elmore. Abn PFTs, rx albuterol PRN - has not started using. Needs to schedule chest CT for further evaluation. Working dx bronchiolitis/asthma r/t partially controlled RA. Labs 01/03/24: downtrending Vectra 40, uptrending ESR 84. NOTE: Baseline Hand/ SI joint xrays 3/22 negative for erosions, no deformities / and neg HLAB27 Initial presentation + Achilles tendonitis + plantar fasciitis, R wrist and MCP/ MTP inflammation -Simponi Aria infusion 1/2023-5/2023 with no control in synovitis --> will not trial any other TNFi or biosimiliars -HCQ 1/2023-4/2023 well tolerated but stopped taking and did not control synovitis -MTX up to 20 mg well tolerated PO, trial SQ for compliance purposes and efficacy but did not continue.. couldn't tolerate Self injection In future if needed: would consider Actemra/ or Atiya (low cardiac risk)  Discussion:  patient frustrated with worsening sicca and return of joint pain, though admittedly much better.  Diff dx: -Partially controlled RA: Increasing arthralgias, Profound dry eye, dry mouth, RA r/t bronchiolitis/asthma -SS: Profound dry eye, dry mouth. No recent ophthalmology exam, no SS ab previously assessed- update now.. certainly sicca can be present in RA with/ or without SS antibodies.. -SLE: Dyspnea, sicca sx. Previously neg COLLINS, YAMIL, DSDNA, nl C3/C4 -IGG4: can result in SS-like sx, +ANCA (increasing evidence positivity in some IGG4-RD), dyspnea, and epigastric pain. No previous IGG panel Labs and imaging ordered for further evaluation now.. updated rheum studies  If review labs/imaging do not suggest new AI dx will need to change RA management consider restarting HCQ to address sicca symptoms/arthralgias, previously well tolerated. Start Pilocarpine PRN for sicca sx, and continue current RA regimen  2) Compliance: still unclear language barrier, reports understanding and speaks good English but following directions not always consistent. Concerned about degree of active joint inflammation and repeated use of steroid for control. Ideally with infusion tx should be better, RN team for support... reports compliance Need to provide all literature in Hebrew For all future appts must use - STILL -Will continue to reassess at each visit  3) Osteoporosis: DEXA 1/19/23 w/ most severe T score -2.5... no comparison (different machine) but previously -2.3 at this level... FN -1.9 previously 0.8 and TH -1.4 previously 0.7. No previous treatment or known fractures. RIsks: long term steroids > 3 m (off now), age, RA. Will need treatment but will not address till stable control RA still not achieved..(too overwhelmed to discuss at this time again today) -PCP suggested Prolia, only mild osteoporosis, guidelines suggest starting with BP.. , could be a candidate for biphosphate but if compliance or language barrier a concern, could do Reclast  4) Unintentional wt loss: fully resolved over past 6 m.. stable BMI 27 --> now BMI 25 slow weightloss since 08/23 down 5-6 lb likely related to increasing dry mouth and reduced appetite Wt had previously stabilized with consistent use of MTX and Prednisone (previously severe GI distress with  7 lbs wt loss in 3 weeks) , BMI 25 --> 27-> 28  Plan:  -Referral for ophthalmology eye exam, evaluate for inflammatory eye disease and dry eye related to Rheumatoid Arthritis  -Continue MTX 20mg weekly inj and Orencia mnthly infusion  -Recommended Chest Xray for immediate evaluation and aid auth process for CT scan ordered by pulm.  -Complete CT scan ordered by pulmonology. Continue evaluation and management with pulmonology Dr. Elmore.  -Pilocarpine 5mg tablets take 1-2 tablets TID  as needed to help with dry mouth  -Dentist for evaluation dry mouth with routinely cleaning recommended ideally q 3 m- but minimum q 6 m  -Scripts labs given today complete at [facility] lab before next follow-up appointment  -Do not take any prednisone (call office for any severe flares)  -Follow-up in office visit on 03/06/24 on infusion day  Patient was seen and evaluated by Yasmeen Macias DNP (training in rheumatology) and with MATILDE Diaz I agree with full evaluation and plan as described above. [FreeTextEntry3] : Sheela Diaz DNP, ANP-C Division or Rheumatology [facility]

## 2024-04-20 NOTE — REVIEW OF SYSTEMS
[Dry Eyes] : dryness of the eyes [Loss Of Hearing] : hearing loss [SOB on Exertion] : shortness of breath during exertion [As Noted in HPI] : as noted in HPI [Arthralgias] : arthralgias [Joint Pain] : joint pain [Joint Swelling] : joint swelling [Joint Stiffness] : joint stiffness [Sleep Disturbances] : sleep disturbances [Anxiety] : anxiety [Negative] : Heme/Lymph [Fever] : no fever [Chills] : no chills [Feeling Poorly] : not feeling poorly [Feeling Tired] : not feeling tired [Eye Pain] : no eye pain [Red Eyes] : eyes not red [Eyesight Problems] : no eyesight problems [Chest Pain] : no chest pain [Palpitations] : no palpitations [Lower Ext Edema] : no lower extremity edema [Shortness Of Breath] : no shortness of breath [Wheezing] : no wheezing [Cough] : no cough [Abdominal Pain] : no abdominal pain [Vomiting] : no vomiting [Constipation] : no constipation [Diarrhea] : no diarrhea [Skin Lesions] : no skin lesions [Skin Wound] : no skin wound [Itching] : no itching [Confused] : no confusion [Limb Weakness] : no limb weakness [Difficulty Walking] : no difficulty walking [Swollen Glands] : no swollen glands [Swollen Glands In The Neck] : no swollen glands in the neck [FreeTextEntry2] : Now 6 lb weightloss since 08/23 <--c/o wt loss due to change in appetite...reports 8 lbs but only down 1lb since 8/2023 [FreeTextEntry3] : No previous hx IED or issues w/ corneal abrasions  [FreeTextEntry4] : Dry mouth.- dysphagia- Unknown origin of Left sided hearing loss w/ chronic tinnitus- stable  [FreeTextEntry7] : GI distress with MTX SQ.. especially when injecting into abd -with persistent decrease appetite [FreeTextEntry9] : pain throughout bl hands, shoulders, knees, and back   [de-identified] : no recent psoriasis episodes (only 1  episode previously) and still  [de-identified] : Increasing anxiety w/ new onset symptoms potentially r/t RA  <-- about treatments, pain is waking her up from sleep

## 2024-04-20 NOTE — REASON FOR VISIT
[Follow-Up: _____] : a [unfilled] follow-up visit [[employer] ] : provided by [employer]   [FreeTextEntry3] : Syrian  [Time Spent: ____ minutes] : Total time spent using  services: [unfilled] minutes. The patient's primary language is not English thus required  services. [Interpreters_IDNumber] : [id number] [Interpreters_FullName] : Mahi [TWNoteComboBox1] : Swedish

## 2024-04-20 NOTE — PHYSICAL EXAM
[General Appearance - Alert] : alert [General Appearance - In No Acute Distress] : in no acute distress [General Appearance - Well Nourished] : well nourished [General Appearance - Well Developed] : well developed [General Appearance - Well-Appearing] : healthy appearing [Sclera] : the sclera and conjunctiva were normal [Extraocular Movements] : extraocular movements were intact [Oropharynx] : the oropharynx was normal [Neck Appearance] : the appearance of the neck was normal [Neck Cervical Mass (___cm)] : no neck mass was observed [Thyroid Diffuse Enlargement] : the thyroid was not enlarged [Thyroid Nodule] : there were no palpable thyroid nodules [Respiration, Rhythm And Depth] : normal respiratory rhythm and effort [Exaggerated Use Of Accessory Muscles For Inspiration] : no accessory muscle use [Auscultation Breath Sounds / Voice Sounds] : lungs were clear to auscultation bilaterally [Heart Rate And Rhythm] : heart rate was normal and rhythm regular [Heart Sounds] : normal S1 and S2 [Heart Sounds Gallop] : no gallops [Murmurs] : no murmurs [Heart Sounds Pericardial Friction Rub] : no pericardial rub [Edema] : there was no peripheral edema [No CVA Tenderness] : no ~M costovertebral angle tenderness [Abnormal Walk] : normal gait [Nail Clubbing] : no clubbing  or cyanosis of the fingernails [Motor Tone] : muscle strength and tone were normal [Involuntary Movements] : no involuntary movements were seen [Skin Color & Pigmentation] : normal skin color and pigmentation [Skin Turgor] : normal skin turgor [] : no rash [Skin Lesions] : no skin lesions [No Focal Deficits] : no focal deficits [Oriented To Time, Place, And Person] : oriented to person, place, and time [Impaired Insight] : insight and judgment were intact [Affect] : the affect was normal [Mood] : the mood was normal [FreeTextEntry1] : Unclear understanding of medical information, compliance fair over past year --> MUST USE

## 2024-04-20 NOTE — DATA REVIEWED
[FreeTextEntry1] : Labs 4/24- AVISE COLLINS 1:80S, CCP > 340, RF 80, B2G IGG 23, IL6 17 (< 13), ESR 95, CRP 36 w/ nl CMP, w/ CBC Hb 10.8, Plt 413 otherwise nl (Jan Hb 12.0-> progressive decline now over past y)   12/2022 nl CMP, CBC, ESR 60, Vectra 52  Diagnostics:  EGD ? erythematous mucosa (no ulcerations)- ? Colonoscopy..   Need copies of: - Recent labs done from Dr. Ayala ? - XR and MRI of Right ankle, reportedly unremarkable

## 2024-04-20 NOTE — ASSESSMENT
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  Presents today for follow-up recently elevated labs  MUST USE  AT ALL VISITS TO HELP W/ PATIENT COMPLIANCE  1) RA seropositive: though pattern is in SpA.. with recurrent plantar fascitis and asymmetrical presentation and possible psoriasis - patch on L ankle/ possible scalp lesion.. Currently only fairly controlled:  with reported pain as noted and observed synovitis today BL knees, L3/R1 MCP- CDAI 19 suggesting moderate disease activity..  CCP > 250 w/ Vectra 12/22 of 52 excellent and full response to pred at 20 mg daily.. feels great on high dose steroids, always. Eventually started on Simponi Aria 1/23- with partial response despite loading dose w/ steroids. Added  mg (200 mg is 3.5 mg/kg for 57 kilograms) for persistent synovitis several months, well tolerated but not effective; Self -dc HCQ- "too many medicines". Started MTX in April 2023 (4 pills/week) for persistent synovitis in R ankle fullness, R plantar fasciites, cervical pain. Titrated up to 8 pills/week in May 2023 but due to compliance vs language barrier vs miscommunication, patient not fully compliant with MTX and had flares through Simponi Aria with elevated ESR/CRP. Switched Simponi to mnthly Orencia q4wk excellent compliance (started 08/22/23) and had been c/w MTX at 20 mg wkly/off all steroids w/o recurrence psoriasis or active joint inflammation till recently again.. 02/15/24 presented w/ marked increase in sicca syndrome increasing dry eyes and mouth w/o oral uclers (particularly disruptive, decrease in appetite and slow weightloss r/t globus sensation and reduced appetite) along with some mild return of b/l shoulder and knee pain.  Additionally..  MO - Recent onset difficulty climbing stairs. Currently w/u w/ pulmonology Dr. Elmore. Abn PFTs, rx albuterol PRN - has not started using. Needs to schedule chest CT for further evaluation. Working dx bronchiolitis/asthma r/t partially controlled RA. TODAY chronic compliance challenges. Request patient present for visit to review labwork and assess tx response since last visit. Pt self-dc MTX 2 weeks ago 2/2 AE oral ulcers, w/ FA daily 1mg rx. Reports oral ulcer, dry mouth, globus sensation improved 1 week after dc medication but has started to have increased joint pain since dc. 3 weeks ago 3 day episode sharp b/l knee pain unable to walk, w/ b/l knee swelling. Not willing to reconsider med at lower dosage Slightly full L3/R1 MCP w/ ttp on exam. Last labs 04/10/24 c/w dc MTX: IgA Bet 2 86 CRP 36, ESR 95, C3/4 nl NOTE: Baseline Hand/ SI joint xrays 3/22 negative for erosions, no deformities / and neg HLAB27 Initial presentation + Achilles tendonitis + plantar fasciitis, R wrist and MCP/ MTP inflammation -Simponi Aria infusion 1/2023-5/2023 with no control in synovitis --> will not trial any other TNFi or biosimiliars -HCQ 1/2023-4/2023 well tolerated but stopped taking and did not control synovitis as monotherapy -MTX up to 20 mg well tolerated PO, trial SQ for compliance purposes and efficacy but did not continue.. couldn't tolerate Self injection In future if needed: would consider Actemra/ or Atiya (low cardiac risk)  Discussion: Sicca sx improving patient now frustrated with increasing intermittent joint pain w/ dc MTX.  Also concerned about progressive anemia over past year with ESR / CRP very high.. EGD + for erythatous changes but ? was Colonoscopy done.. need to follow closely...  Diff dx: - acute infectious process:  sinusitis/ hearing loss.. may be reason for inflammation, f/u ENT -Partially controlled RA: Increasing arthralgias, Profound dry eye, dry mouth, RA r/t bronchiolitis/asthma -SS: Profound dry eye, dry mouth. Reports improvement w/ dc MTX and starting pilocarpine 5mg daily. SS ab/AVISE/IL-6 pending; Completed oph exam started on eyedrops TID does not know name. Will request report for review - certainly sicca can be present in RA with/ or without SS antibodies. Reviewed ENT visit dx sinus infection (see below) -SLE: Dyspnea, sicca sx. Previously neg COLLINS, YAMIL, DSDNA, nl C3/C4 - Has not completed chest XR/CT, reports spontaneous improvement MO, 02/24 nl PFTs and 6MWT -IGG4: can result in SS-like sx, +ANCA (increasing evidence positivity in some IGG4-RD), dyspnea, and epigastric pain. No previous IGG panel - need to order IGG panel for further eval?? - Paraneoplastic process:  f/u colonoscopy, needs CTscan chest, if not remarkable will also need CTScan Ab/pelvis given persistent symptoms despite treatment....   Given cannot tolerate MTX, in agreement to trial LEF, start LD 10mg daily, to prevent progressive RA flare on likely inadequate monotherapy (Orencia). Reassess tolerance 1 month and titrate up to maintenance dose 20mg daily Increasing joint pain off MTX but improved sicca sx... potentially intermittent sx unrelated to dc MTX of unclear etiology. Still multiple serologies and imaging pending. Will continue to reassess for persistent, recurring sx suggestive of evolving AI dx.   2) Compliance: still unclear language barrier, reports understanding and speaks good English but following directions not always consistent. Concerned about degree of active joint inflammation and repeated use of steroid for control. Ideally with infusion tx should be better, RN team for support... reports compliance Need to provide all literature in Japanese For all future appts must use - STILL Discontinued  (independently- similar to HCQ) MTX 2/2 AE, did not report AE to provider or discontinuation of medication Reinforced through  need for communication of worsening sx, AE to allow for appropriate tx adjusmtent and better dx control  3) ENT-related conditions: -Chronic Sinusitis: Dx by endoscope sinuses, rx doxycycline 100mg BID x 10 days. Related to aiCTD-SS vs sicca syndrome?? -Tinnitus/Hearing loss: Also scheduled MR Brain and auditory canals hx hearing loss (possible also related issue of compliance)  4) Osteoporosis: DEXA 1/19/23 w/ most severe T score -2.5... no comparison (different machine) but previously -2.3 at this level... FN -1.9 previously 0.8 and TH -1.4 previously 0.7. No previous treatment or known fractures. Risks: long term steroids > 3 m (off now), age, RA. Will need treatment but will not address till stable control RA still not achieved..(too overwhelmed to discuss at this time again today) PCP suggested Prolia, only mild osteoporosis, guidelines suggest starting with BP.. , could be a candidate for bisphosphonate but if compliance or language barrier a concern, would do Reclast.  Await tx of epigastric issues as noted above -Still need further discussion and treatment plan  5) Unintentional wt loss: fully resolved over past 6 m.. stable BMI 27 --> now BMI 26 slow weightloss since 08/23 down 5-6 lb likely related to increasing dry mouth and reduced appetite and persistent systemic inflammaiton and cannot r/o underlying malignant process.. w/u in progress (working with Pulm- CTscan chest pending/ GI- EGD done, ? colonoscopy and advanced imaging if nl needed) Still reporting reduced appetite w/ improved dry mouth and resolved globus sensation.  NOTE: Wt had previously stabilized with consistent use of MTX and Prednisone (previously severe GI distress with  7 lbs wt loss in 3 weeks)  Plan:  -Start leflunomide 10mg daily x 1 month with plan to increase to 20mg daily  -Requested ophthalmology report to review  -Pilocarpine 5mg tablets take 1-2 tablets TID  as needed to help with dry mouth  -Still need to complete XR, and chest CT ordered Dr. Elmore  -Complete MR brain and auditory canal ordered Dr. Mobley, f/u as scheduled  -Provided backline number to call if increased inflammation/worsening sx. Do not take any prednisone, for years masked aggressive disease with daily steroids   -Discontinue MTX  -Dentist for evaluation dry mouth with routinely cleaning recommended ideally q 3 m- but minimum q 6 m  -Schedule MDI for 05/24 w/ RPA and repeat labs.  Needs careful monitoring till achieves better control.    - calculate CDAI next visit  Patient was seen and evaluated by Yasmeen Macias DNP (training in rheumatology) and with MATILDE Diaz I agree with full evaluation and plan as described above.

## 2024-04-20 NOTE — HISTORY OF PRESENT ILLNESS
[FreeTextEntry1] : MUST USE  AT ALL VISITS TO HELP W/ PATIENT COMPLIANCE  -Since last visit reports that overall she is feel better but still has some joint pain -Today reports L arm/shoulder pain, b/l hands and knees. -3 weeks ago had episode sharp pain in the knees and wasn't able to walk x 3days. Took Tylenol 2 tablets q 6 hours until resolved. Pain and swelling was worse in the morning. Patient tried to call provider when inflamed but was on hold too long. -Last Orencia infusion 03/18/24. Stopped MTX 2 weeks ago. Reports mouth sores and dryness and globus sensation resolved 1 week after discontinuing. 11/23 EGD polyp 4mm, diffuse moderate erythematous mucosa -Was taking folic acid 1 MG daily w/ MTX 8tabs weekly. Joint pain is worse with stopping the medication -Has been taking pilocarpine 5mg tab daily, with improvement in dry mouth and dry eye. Completed ophthalmology eval- "everything was ok," prescribed drops doesn't remember what they are using eyedrops TID. Went to [facility] the street from practice, doesn't remember providers name. -Seen by ENT 04/04/24 dx non-allergic rhinitis, sinusitis, rx doxycycline 100mg BID x 10days.  Supposed to go back to Dr. Leandra castanon in ear and head MR brain and internal auditory canals ordered -No long experiencing SOB, breathing is "good," has not completed CT scan ordered by Dr. Elmore. 02/24 pulm report nl PFTs, 6MWT -Reports still losing wt however stable BMI, continued reduced appetite  Last labs 04/10/24: IgA Bet 2 86 CRP 36, ESR 95, C3/4 nl Elevated APR c/w recently d/c MTX ______________________ 2/15/2024 -No prednisone since last visit, Currently taking MTX 8 weekly , folic acid, Orencia q4wks- tolerating all these fairly well with no SE previous GI SE with MTX / Tox -Increase b/l shoulder, back and knee pain but no obvious joint swelling  -No active psoriasis at this time -Not taking any muscle relaxer still able to sleep 7 hours daily wakes up from hand numbness -Dry mouth increasing over last few months w/o oral sores, difficulty swallowing w/o any episodes of choking. Reduced appetite 2/2 globus sensation, 3 lb weightloss since last visit. Dry eyes as well unable to produce tears for several months. Has not had recent ophthalmology exam. -No known issues with thyroid -Episode MO difficulty going up the stairs. Evaluated by Dr. Holger Elmore had abn PFTs, referred for chest CT. Rx Albuterol inhaler PRN has not picked up yet for use. Working dx Bronchiolitis/asthma r/t RA  Annual exam next week PCP: Dr. Hiren Eagle Labs 01/03/24: Vectra 40, ESR 84. Neg/nl: Hep, QTB, CBC, CMP, CRP  - ROS otherwise ok    1) Inflammatory polyarthropathy:  CCP + RA (newly dx 3/22)  2) Osteoporosis  ______________________________________________________________________________   (Initial HPI  3/2/22)  56 yo female coming in for consult for long-standing hx of polyarthropathy.   - 2 years of joint pain and swelling, worsens with raining involving R wrist, R elbow, MCPs bilateral R > L, bilateral shoulders, bilateral knees, R ankle.  - Morning stiffness for 10 minutes approx.  - + Back pain worse in the AM, lower. Feels better in 5 minutes.  - Takes OTC Tylenol/Motrin/Aleve with minimal improvement.  - Ankle swelling has been assessed with XR, MRI - reportedly nothing remarkable.  - Plantar fasciitis bilateral dx, has tried steroid injection in bilateral feet with some improvement, but couldn't pursue this option long-term.  - Dx with arthritisAg - labs done recently - was told arthritis might be of rheum. origin - Dx with psoriasis a few years ago, hydrocortisone cream improved rash, was on left anterior ankle/distal leg     - Hx of hearing loss (not complete) in left ear - unknown origin. (took high dose of steroids without improvement in the past)  - Denies trigger finger, swelling in fingers, toe swelling, vision changes, diarrhea, abd pain, red eyes, night sweats, lymphadenopathy, chest pain, SOB, pleurisy.   Social:  - P 3 G 3 (oldest 35 y.o), , lives alone.  - Non-smoker, social drinker   FH + history of arthritis (aunt), no family hx of psoriasis

## 2024-04-28 ENCOUNTER — RX RENEWAL (OUTPATIENT)
Age: 59
End: 2024-04-28

## 2024-04-28 LAB
ALBUMIN SERPL ELPH-MCNC: 3.9 G/DL
ALP BLD-CCNC: 90 U/L
ALT SERPL-CCNC: 14 U/L
ANION GAP SERPL CALC-SCNC: 11 MMOL/L
AST SERPL-CCNC: 15 U/L
BILIRUB SERPL-MCNC: 0.4 MG/DL
BUN SERPL-MCNC: 9 MG/DL
CALCIUM SERPL-MCNC: 9.3 MG/DL
CHLORIDE SERPL-SCNC: 107 MMOL/L
CO2 SERPL-SCNC: 23 MMOL/L
CREAT SERPL-MCNC: 0.71 MG/DL
CRP SERPL-MCNC: 37 MG/L
EGFR: 98 ML/MIN/1.73M2
ERYTHROCYTE [SEDIMENTATION RATE] IN BLOOD BY WESTERGREN METHOD: 97 MM/HR
GLUCOSE SERPL-MCNC: 95 MG/DL
POTASSIUM SERPL-SCNC: 4.5 MMOL/L
PROT SERPL-MCNC: 6.8 G/DL
SODIUM SERPL-SCNC: 140 MMOL/L

## 2024-04-28 RX ORDER — PREDNISONE 5 MG/1
5 TABLET ORAL
Qty: 30 | Refills: 0 | Status: ACTIVE | COMMUNITY
Start: 2023-05-16 | End: 1900-01-01

## 2024-05-03 ENCOUNTER — APPOINTMENT (OUTPATIENT)
Dept: MRI IMAGING | Facility: CLINIC | Age: 59
End: 2024-05-03
Payer: COMMERCIAL

## 2024-05-03 ENCOUNTER — APPOINTMENT (OUTPATIENT)
Dept: MAMMOGRAPHY | Facility: CLINIC | Age: 59
End: 2024-05-03
Payer: COMMERCIAL

## 2024-05-03 ENCOUNTER — OUTPATIENT (OUTPATIENT)
Dept: OUTPATIENT SERVICES | Facility: HOSPITAL | Age: 59
LOS: 1 days | End: 2024-05-03
Payer: COMMERCIAL

## 2024-05-03 DIAGNOSIS — Z98.890 OTHER SPECIFIED POSTPROCEDURAL STATES: Chronic | ICD-10-CM

## 2024-05-03 DIAGNOSIS — Z00.00 ENCOUNTER FOR GENERAL ADULT MEDICAL EXAMINATION WITHOUT ABNORMAL FINDINGS: ICD-10-CM

## 2024-05-03 DIAGNOSIS — H93.12 TINNITUS, LEFT EAR: ICD-10-CM

## 2024-05-03 DIAGNOSIS — H91.22 SUDDEN IDIOPATHIC HEARING LOSS, LEFT EAR: ICD-10-CM

## 2024-05-03 PROCEDURE — 77067 SCR MAMMO BI INCL CAD: CPT | Mod: 26

## 2024-05-03 PROCEDURE — 70553 MRI BRAIN STEM W/O & W/DYE: CPT | Mod: 26

## 2024-05-03 PROCEDURE — 77063 BREAST TOMOSYNTHESIS BI: CPT | Mod: 26

## 2024-05-03 PROCEDURE — A9585: CPT

## 2024-05-03 PROCEDURE — 70553 MRI BRAIN STEM W/O & W/DYE: CPT

## 2024-05-03 PROCEDURE — 77067 SCR MAMMO BI INCL CAD: CPT

## 2024-05-03 PROCEDURE — 77063 BREAST TOMOSYNTHESIS BI: CPT

## 2024-05-15 ENCOUNTER — APPOINTMENT (OUTPATIENT)
Dept: RHEUMATOLOGY | Facility: CLINIC | Age: 59
End: 2024-05-15
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
## 2024-07-04 ENCOUNTER — EMERGENCY (EMERGENCY)
Facility: HOSPITAL | Age: 59
LOS: 0 days | Discharge: HOME | End: 2024-07-04
Attending: EMERGENCY MEDICINE
Payer: COMMERCIAL

## 2024-07-04 VITALS
DIASTOLIC BLOOD PRESSURE: 72 MMHG | HEART RATE: 82 BPM | SYSTOLIC BLOOD PRESSURE: 114 MMHG | RESPIRATION RATE: 20 BRPM | TEMPERATURE: 98 F | OXYGEN SATURATION: 99 %

## 2024-07-04 VITALS — WEIGHT: 121.92 LBS

## 2024-07-04 DIAGNOSIS — Z98.890 OTHER SPECIFIED POSTPROCEDURAL STATES: Chronic | ICD-10-CM

## 2024-07-04 LAB
ALBUMIN SERPL ELPH-MCNC: 3.1 G/DL — LOW (ref 3.3–5)
ALP SERPL-CCNC: 81 U/L — SIGNIFICANT CHANGE UP (ref 40–120)
ALT FLD-CCNC: 23 U/L — SIGNIFICANT CHANGE UP (ref 12–78)
ANION GAP SERPL CALC-SCNC: 5 MMOL/L — SIGNIFICANT CHANGE UP (ref 5–17)
AST SERPL-CCNC: 20 U/L — SIGNIFICANT CHANGE UP (ref 15–37)
BASOPHILS # BLD AUTO: 0.01 K/UL — SIGNIFICANT CHANGE UP (ref 0–0.2)
BASOPHILS NFR BLD AUTO: 0.1 % — SIGNIFICANT CHANGE UP (ref 0–2)
BILIRUB SERPL-MCNC: 0.5 MG/DL — SIGNIFICANT CHANGE UP (ref 0.2–1.2)
BUN SERPL-MCNC: 14 MG/DL — SIGNIFICANT CHANGE UP (ref 7–23)
CALCIUM SERPL-MCNC: 9.4 MG/DL — SIGNIFICANT CHANGE UP (ref 8.5–10.1)
CHLORIDE SERPL-SCNC: 111 MMOL/L — HIGH (ref 96–108)
CO2 SERPL-SCNC: 24 MMOL/L — SIGNIFICANT CHANGE UP (ref 22–31)
CREAT SERPL-MCNC: 0.82 MG/DL — SIGNIFICANT CHANGE UP (ref 0.5–1.3)
EGFR: 82 ML/MIN/1.73M2 — SIGNIFICANT CHANGE UP
EOSINOPHIL # BLD AUTO: 0.07 K/UL — SIGNIFICANT CHANGE UP (ref 0–0.5)
EOSINOPHIL NFR BLD AUTO: 0.9 % — SIGNIFICANT CHANGE UP (ref 0–6)
GLUCOSE SERPL-MCNC: 96 MG/DL — SIGNIFICANT CHANGE UP (ref 70–99)
HCT VFR BLD CALC: 35.6 % — SIGNIFICANT CHANGE UP (ref 34.5–45)
HGB BLD-MCNC: 11.2 G/DL — LOW (ref 11.5–15.5)
IMM GRANULOCYTES NFR BLD AUTO: 0.4 % — SIGNIFICANT CHANGE UP (ref 0–0.9)
LYMPHOCYTES # BLD AUTO: 1.07 K/UL — SIGNIFICANT CHANGE UP (ref 1–3.3)
LYMPHOCYTES # BLD AUTO: 14.1 % — SIGNIFICANT CHANGE UP (ref 13–44)
MCHC RBC-ENTMCNC: 25.6 PG — LOW (ref 27–34)
MCHC RBC-ENTMCNC: 31.5 GM/DL — LOW (ref 32–36)
MCV RBC AUTO: 81.5 FL — SIGNIFICANT CHANGE UP (ref 80–100)
MONOCYTES # BLD AUTO: 0.53 K/UL — SIGNIFICANT CHANGE UP (ref 0–0.9)
MONOCYTES NFR BLD AUTO: 7 % — SIGNIFICANT CHANGE UP (ref 2–14)
NEUTROPHILS # BLD AUTO: 5.87 K/UL — SIGNIFICANT CHANGE UP (ref 1.8–7.4)
NEUTROPHILS NFR BLD AUTO: 77.5 % — HIGH (ref 43–77)
PLATELET # BLD AUTO: 310 K/UL — SIGNIFICANT CHANGE UP (ref 150–400)
POTASSIUM SERPL-MCNC: 3.9 MMOL/L — SIGNIFICANT CHANGE UP (ref 3.5–5.3)
POTASSIUM SERPL-SCNC: 3.9 MMOL/L — SIGNIFICANT CHANGE UP (ref 3.5–5.3)
PROT SERPL-MCNC: 7 GM/DL — SIGNIFICANT CHANGE UP (ref 6–8.3)
RBC # BLD: 4.37 M/UL — SIGNIFICANT CHANGE UP (ref 3.8–5.2)
RBC # FLD: 14.8 % — HIGH (ref 10.3–14.5)
SODIUM SERPL-SCNC: 140 MMOL/L — SIGNIFICANT CHANGE UP (ref 135–145)
WBC # BLD: 7.58 K/UL — SIGNIFICANT CHANGE UP (ref 3.8–10.5)
WBC # FLD AUTO: 7.58 K/UL — SIGNIFICANT CHANGE UP (ref 3.8–10.5)

## 2024-07-04 PROCEDURE — 36000 PLACE NEEDLE IN VEIN: CPT

## 2024-07-04 PROCEDURE — 36415 COLL VENOUS BLD VENIPUNCTURE: CPT

## 2024-07-04 PROCEDURE — 85025 COMPLETE CBC W/AUTO DIFF WBC: CPT

## 2024-07-04 PROCEDURE — 99283 EMERGENCY DEPT VISIT LOW MDM: CPT | Mod: 25

## 2024-07-04 PROCEDURE — 99284 EMERGENCY DEPT VISIT MOD MDM: CPT

## 2024-07-04 PROCEDURE — 80053 COMPREHEN METABOLIC PANEL: CPT

## 2024-07-15 ENCOUNTER — APPOINTMENT (OUTPATIENT)
Dept: RHEUMATOLOGY | Facility: CLINIC | Age: 59
End: 2024-07-15
Payer: COMMERCIAL

## 2024-07-15 VITALS
HEART RATE: 85 BPM | SYSTOLIC BLOOD PRESSURE: 114 MMHG | OXYGEN SATURATION: 98 % | TEMPERATURE: 98 F | DIASTOLIC BLOOD PRESSURE: 69 MMHG | RESPIRATION RATE: 16 BRPM

## 2024-07-15 PROCEDURE — 96413 CHEMO IV INFUSION 1 HR: CPT

## 2024-07-15 PROCEDURE — 96374 THER/PROPH/DIAG INJ IV PUSH: CPT | Mod: 59

## 2024-07-16 RX ORDER — METHYLPREDNISOLONE 40 MG/ML
40 INJECTION, POWDER, LYOPHILIZED, FOR SOLUTION INTRAMUSCULAR; INTRAVENOUS
Qty: 1 | Refills: 0 | Status: COMPLETED
Start: 2023-12-26

## 2024-07-16 RX ORDER — ACETAMINOPHEN 325 MG/1
325 TABLET ORAL
Qty: 0 | Refills: 0 | Status: COMPLETED
Start: 2024-01-25

## 2024-07-16 RX ORDER — CETIRIZINE HYDROCHLORIDE 10 MG/1
10 TABLET, COATED ORAL
Qty: 0 | Refills: 0 | Status: COMPLETED
Start: 2024-01-25

## 2024-07-17 ENCOUNTER — EMERGENCY (EMERGENCY)
Facility: HOSPITAL | Age: 59
LOS: 1 days | Discharge: HOME | End: 2024-07-17
Attending: EMERGENCY MEDICINE | Admitting: EMERGENCY MEDICINE
Payer: COMMERCIAL

## 2024-07-17 VITALS
DIASTOLIC BLOOD PRESSURE: 80 MMHG | RESPIRATION RATE: 19 BRPM | SYSTOLIC BLOOD PRESSURE: 119 MMHG | HEIGHT: 57 IN | WEIGHT: 123.9 LBS | TEMPERATURE: 100 F | HEART RATE: 112 BPM | OXYGEN SATURATION: 98 %

## 2024-07-17 VITALS
RESPIRATION RATE: 20 BRPM | HEART RATE: 90 BPM | OXYGEN SATURATION: 97 % | TEMPERATURE: 98 F | SYSTOLIC BLOOD PRESSURE: 116 MMHG | DIASTOLIC BLOOD PRESSURE: 74 MMHG

## 2024-07-17 DIAGNOSIS — Z98.890 OTHER SPECIFIED POSTPROCEDURAL STATES: Chronic | ICD-10-CM

## 2024-07-17 LAB
APPEARANCE UR: CLEAR — SIGNIFICANT CHANGE UP
BILIRUB UR-MCNC: NEGATIVE — SIGNIFICANT CHANGE UP
COLOR SPEC: YELLOW — SIGNIFICANT CHANGE UP
DIFF PNL FLD: ABNORMAL
FLUAV AG NPH QL: SIGNIFICANT CHANGE UP
FLUBV AG NPH QL: SIGNIFICANT CHANGE UP
GLUCOSE UR QL: NEGATIVE MG/DL — SIGNIFICANT CHANGE UP
KETONES UR-MCNC: NEGATIVE MG/DL — SIGNIFICANT CHANGE UP
LEUKOCYTE ESTERASE UR-ACNC: NEGATIVE — SIGNIFICANT CHANGE UP
NITRITE UR-MCNC: NEGATIVE — SIGNIFICANT CHANGE UP
PH UR: 5.5 — SIGNIFICANT CHANGE UP (ref 5–8)
PROT UR-MCNC: NEGATIVE MG/DL — SIGNIFICANT CHANGE UP
RAPID RVP RESULT: SIGNIFICANT CHANGE UP
RSV RNA NPH QL NAA+NON-PROBE: SIGNIFICANT CHANGE UP
SARS-COV-2 RNA SPEC QL NAA+PROBE: SIGNIFICANT CHANGE UP
SARS-COV-2 RNA SPEC QL NAA+PROBE: SIGNIFICANT CHANGE UP
SP GR SPEC: 1.01 — SIGNIFICANT CHANGE UP (ref 1–1.03)
UROBILINOGEN FLD QL: 0.2 MG/DL — SIGNIFICANT CHANGE UP (ref 0.2–1)

## 2024-07-17 PROCEDURE — 87637 SARSCOV2&INF A&B&RSV AMP PRB: CPT

## 2024-07-17 PROCEDURE — 71046 X-RAY EXAM CHEST 2 VIEWS: CPT

## 2024-07-17 PROCEDURE — 71046 X-RAY EXAM CHEST 2 VIEWS: CPT | Mod: 26

## 2024-07-17 PROCEDURE — 81001 URINALYSIS AUTO W/SCOPE: CPT

## 2024-07-17 PROCEDURE — 99283 EMERGENCY DEPT VISIT LOW MDM: CPT | Mod: 25

## 2024-07-17 PROCEDURE — 0225U NFCT DS DNA&RNA 21 SARSCOV2: CPT

## 2024-07-17 PROCEDURE — 99284 EMERGENCY DEPT VISIT MOD MDM: CPT

## 2024-07-17 RX ORDER — ACETAMINOPHEN 500 MG/5ML
650 LIQUID (ML) ORAL ONCE
Refills: 0 | Status: COMPLETED | OUTPATIENT
Start: 2024-07-17 | End: 2024-07-17

## 2024-07-17 RX ORDER — TRAMADOL HYDROCHLORIDE 50 MG/1
25 TABLET, FILM COATED ORAL ONCE
Refills: 0 | Status: DISCONTINUED | OUTPATIENT
Start: 2024-07-17 | End: 2024-07-17

## 2024-07-17 RX ADMIN — TRAMADOL HYDROCHLORIDE 25 MILLIGRAM(S): 50 TABLET, FILM COATED ORAL at 21:26

## 2024-07-17 RX ADMIN — Medication 650 MILLIGRAM(S): at 20:20

## 2024-07-19 ENCOUNTER — APPOINTMENT (OUTPATIENT)
Dept: RHEUMATOLOGY | Facility: CLINIC | Age: 59
End: 2024-07-19

## 2024-07-19 VITALS
SYSTOLIC BLOOD PRESSURE: 100 MMHG | HEIGHT: 57 IN | WEIGHT: 119 LBS | DIASTOLIC BLOOD PRESSURE: 60 MMHG | HEART RATE: 105 BPM | BODY MASS INDEX: 25.67 KG/M2 | TEMPERATURE: 97.3 F | OXYGEN SATURATION: 97 %

## 2024-07-19 PROCEDURE — 99215 OFFICE O/P EST HI 40 MIN: CPT | Mod: 25

## 2024-07-19 PROCEDURE — G2211 COMPLEX E/M VISIT ADD ON: CPT | Mod: NC

## 2024-07-19 PROCEDURE — 20610 DRAIN/INJ JOINT/BURSA W/O US: CPT | Mod: RT

## 2024-07-19 RX ORDER — TRIAMCINOLONE ACETONIDE 80 MG/ML
80 INJECTION, SUSPENSION INTRA-ARTICULAR; INTRAMUSCULAR
Qty: 1 | Refills: 0 | Status: COMPLETED | OUTPATIENT
Start: 2024-07-19

## 2024-07-19 RX ADMIN — TRIAMCINOLONE ACETONIDE 0 MG/ML: 80 INJECTION, SUSPENSION INTRA-ARTICULAR; INTRAMUSCULAR at 00:00

## 2024-08-05 ENCOUNTER — NON-APPOINTMENT (OUTPATIENT)
Age: 59
End: 2024-08-05

## 2024-08-06 ENCOUNTER — APPOINTMENT (OUTPATIENT)
Dept: RHEUMATOLOGY | Facility: CLINIC | Age: 59
End: 2024-08-06

## 2024-08-08 ENCOUNTER — APPOINTMENT (OUTPATIENT)
Dept: RHEUMATOLOGY | Facility: CLINIC | Age: 59
End: 2024-08-08

## 2024-08-13 ENCOUNTER — APPOINTMENT (OUTPATIENT)
Dept: RHEUMATOLOGY | Facility: CLINIC | Age: 59
End: 2024-08-13
Payer: COMMERCIAL

## 2024-08-13 DIAGNOSIS — M81.0 AGE-RELATED OSTEOPOROSIS W/OUT CURRENT PATHOLOGICAL FRACTURE: ICD-10-CM

## 2024-08-13 DIAGNOSIS — H93.12 TINNITUS, LEFT EAR: ICD-10-CM

## 2024-08-13 DIAGNOSIS — Z91.89 OTHER SPECIFIED PERSONAL RISK FACTORS, NOT ELSEWHERE CLASSIFIED: ICD-10-CM

## 2024-08-13 DIAGNOSIS — M06.9 RHEUMATOID ARTHRITIS, UNSPECIFIED: ICD-10-CM

## 2024-08-13 PROCEDURE — 99214 OFFICE O/P EST MOD 30 MIN: CPT

## 2024-08-13 PROCEDURE — G2211 COMPLEX E/M VISIT ADD ON: CPT

## 2024-08-13 NOTE — DATA REVIEWED
[FreeTextEntry1] : Labs 4/24- AVISE COLLINS 1:80S, CCP > 340, RF 80, B2G IGG 23, IL6 17 (< 13), ESR 95, CRP 36 w/ nl CMP, w/ CBC Hb 10.8, Plt 413 otherwise nl (Hiren Hb 12.0-> progressive decline now over past y)   12/2022 nl CMP, CBC, ESR 60, Vectra 52  Diagnostics:  CTscan chest 8/5/24 mild traction bronchiectasis stable since 2019 otherwise no evidence of marcos pulmonary fibrosis no suspicion lung on lung nodules,  no new or enlarging lymphadenopathy, with few subcentimeter partially calcified right paratracheal mediastinal lymph nodes are stable consistent with prior granulomatous exposure EGD ? erythematous mucosa (no ulcerations)- ? Colonoscopy..   Need copies of: - Recent labs done from Dr. Ayala ? - XR and MRI of Right ankle, reportedly unremarkable

## 2024-08-13 NOTE — REVIEW OF SYSTEMS
[Dry Eyes] : dryness of the eyes [Loss Of Hearing] : hearing loss [SOB on Exertion] : shortness of breath during exertion [As Noted in HPI] : as noted in HPI [Arthralgias] : arthralgias [Joint Pain] : joint pain [Joint Swelling] : joint swelling [Joint Stiffness] : joint stiffness [Sleep Disturbances] : sleep disturbances [Anxiety] : anxiety [Negative] : Heme/Lymph [Fever] : no fever [Chills] : no chills [Feeling Poorly] : not feeling poorly [Feeling Tired] : not feeling tired [Eye Pain] : no eye pain [Red Eyes] : eyes not red [Eyesight Problems] : no eyesight problems [Chest Pain] : no chest pain [Palpitations] : no palpitations [Lower Ext Edema] : no lower extremity edema [Shortness Of Breath] : no shortness of breath [Wheezing] : no wheezing [Cough] : no cough [Abdominal Pain] : no abdominal pain [Vomiting] : no vomiting [Constipation] : no constipation [Diarrhea] : no diarrhea [Skin Lesions] : no skin lesions [Skin Wound] : no skin wound [Itching] : no itching [Confused] : no confusion [Limb Weakness] : no limb weakness [Difficulty Walking] : no difficulty walking [Swollen Glands] : no swollen glands [Swollen Glands In The Neck] : no swollen glands in the neck [FreeTextEntry2] : appetite nl wt stable over past yr  BMI 25-26  [FreeTextEntry3] : No previous hx IED or issues w/ corneal abrasions  [FreeTextEntry4] : Dry mouth.- dysphagia- Unknown origin of Left sided hearing loss w/ chronic tinnitus- stable -acute dental infection on the left side [FreeTextEntry7] : GI distress with MTX SQ.. especially when injecting into abd -with persistent decrease appetite-tolerating leflunomide better now at full dose 20 mg [FreeTextEntry9] : pain throughout bl hands, , knees, and back  and ankles  -fully resolved [de-identified] : no recent psoriasis episodes (only 1  episode previously) and still  [de-identified] : Increasing anxiety w/ new onset symptoms <-- about treatments, pain is waking her up from sleep

## 2024-08-13 NOTE — PHYSICAL EXAM
[General Appearance - Alert] : alert [General Appearance - In No Acute Distress] : in no acute distress [General Appearance - Well Nourished] : well nourished [General Appearance - Well Developed] : well developed [General Appearance - Well-Appearing] : healthy appearing [Sclera] : the sclera and conjunctiva were normal [Extraocular Movements] : extraocular movements were intact [Oropharynx] : the oropharynx was normal [Neck Appearance] : the appearance of the neck was normal [Neck Cervical Mass (___cm)] : no neck mass was observed [Thyroid Diffuse Enlargement] : the thyroid was not enlarged [Thyroid Nodule] : there were no palpable thyroid nodules [Respiration, Rhythm And Depth] : normal respiratory rhythm and effort [Exaggerated Use Of Accessory Muscles For Inspiration] : no accessory muscle use [Auscultation Breath Sounds / Voice Sounds] : lungs were clear to auscultation bilaterally [Heart Rate And Rhythm] : heart rate was normal and rhythm regular [Heart Sounds] : normal S1 and S2 [Heart Sounds Gallop] : no gallops [Murmurs] : no murmurs [Heart Sounds Pericardial Friction Rub] : no pericardial rub [Edema] : there was no peripheral edema [Cervical Lymph Nodes Enlarged Anterior Bilaterally] : anterior cervical [Supraclavicular Lymph Nodes Enlarged Bilaterally] : supraclavicular [No CVA Tenderness] : no ~M costovertebral angle tenderness [No Spinal Tenderness] : no spinal tenderness [Skin Color & Pigmentation] : normal skin color and pigmentation [Skin Turgor] : normal skin turgor [] : no rash [Skin Lesions] : no skin lesions [No Focal Deficits] : no focal deficits [Oriented To Time, Place, And Person] : oriented to person, place, and time [Impaired Insight] : insight and judgment were intact [Affect] : the affect was normal [Mood] : the mood was normal [Abnormal Walk] : normal gait [Nail Clubbing] : no clubbing  or cyanosis of the fingernails [Involuntary Movements] : no involuntary movements were seen [Motor Tone] : muscle strength and tone were normal [FreeTextEntry1] : Today slightly decreased rROM b/l knees R knee severe pain/ warmth- RESOLVED, L3/R1 MCP fullness- REOLVED,- and BL feet - midfoot full/ TTP- RESOLVED ; . Previous exam: L shoulder POM, fROM; L wrist pain w/ flexion but no obvious swelling, CMC TTP, R4 PIP bogginess and tender, B/l GTB tenderness. Previous exam: ttt bl wrists R>L-> only mild synovitis R1 extensor tendon and marked swelling over L 3 MCP- both areas warm/ swollen/ ttt; all other synovitis resolved; NO tenderness in bl elbows R>L w/ fROM; R shoulder lacking 10-20 degrees active but fROM passively- NO POM NOW....L shoulder lacking 5-10 degrees of full abduction w/ NO POM and w/ +creps bl; R knee RESOLVED all synovitis ...L knee is normal; L lateral malleolus swelling RESOLVED;  initial visit 3/2/22: Multiple sites of ttt in PIP joints in bilateral hands, Right MCP's 2nd and 3rd (more prominent- sl red) warmth, swelling and ttt, Left MCP's 2nd and 3rd ttt without swelling, overall fullness in right hand- worse over R ulnar styloid and lateral aspect of wrist with warmth, ttt but no redness; Right CMC w/ swelling/ warmth- no redness with ttt, Left 1st CMC ttt, warm to touch and swelling. Left biceps tendon ttt. fROM in fingers, bilateral wrists, elbows, shoulders, knee and hips. R ankle- Diffuse swelling on and around achilles tendon, warm to touch. 1st MTP bilateral ttt w/o swelling or redness.

## 2024-08-13 NOTE — REASON FOR VISIT
[Follow-Up: _____] : a [unfilled] follow-up visit [[employer] ] : provided by [employer]   [Interpreters_IDNumber] : [id number] [Interpreters_FullName] : Mahi [[id number]] : Ukrainian

## 2024-08-13 NOTE — ASSESSMENT
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  Recent severe flare...   MUST USE  AT ALL VISITS TO HELP W/ PATIENT COMPLIANCE  1) RA seropositive:CCP > 250 w/ Vectra 12/22 of 52 though pattern is in SpA.. with recurrent plantar fascitis and asymmetrical presentation and possible psoriasis - patch on L ankle/ possible scalp lesion- initially (nothing in past 2 ys)... Again presented with acute flare after missing  2 m of ORencia/ and 2 wks of LEF- with labs suggesting extensive systemic inflammation..    Labs 7/24 w/ CRP 14, ESR 81, CBC w/ plt 531 and Hb 11.2 w/ MCV 81, nl CMP Given 80 mg kenalog R knee and increased LEF to 20 mg as well as resuming prednisone 5 mg daily (was supposed to take 10 mg but not done)... dramatic and full resolution.  Tolerating LEF much better than MTX-no need for additional pantoprazole as previously suggested Prior visit had been  nearly fully controlled off steroids with consistent dosing of both ORencia/ LEF (finally better compliance)... actually able to hold prednisone for many weeks- after being on steroids continuously for more than a year.   - [place]:   increasing dry eyes and mouth w/o oral uclers (particularly disruptive, decrease in appetite and slow weight loss-reported but overall not objective objectively evident.  Does describe r/t globus sensation and reduced appetite.  Was supposed to start pilocarpine but has not -again despite recommendation- now presents with another acute dental problem, again suggests starting.. ... Wt is stable.   - MO - Recent onset difficulty climbing stairs. Currently w/u w/ pulmonology Dr. Elmore. Abn PFTs, rx albuterol PRN - feeling better lately. Updated CTscan essentially stable 8/24-Working dx bronchiectasis /asthma r/t partially controlled RA.   Was supposed to get updated opth exam for possible resumption of HCQ if needed.. not done..  NOTE:  -  mg (200 mg is 3.5 mg/kg for 57 kilograms) for persistent synovitis several months, well tolerated but not effective; Self -dc HCQ- "too many medicines".  - Started MTX in April 2023 (4 pills/week) for persistent synovitis in R ankle fullness, R plantar fasciites, cervical pain. Titrated up to 8 pills/week in May 2023 but due to compliance vs language barrier vs miscommunication could not get consistent dosing.  Switched to LEF for ease of adm  -Simponi  Aria x 6+ m never achieved remission (inconsistent with infusions and DMARDs- needed to find something that was more simple and able to able to keep closer tabs).  Primary failure, did not switch to another TNFi - Baseline Hand/ SI joint xrays 3/22 negative for erosions, no deformities / and neg [id number] - Initial presentation + Achilles tendonitis + plantar fasciitis, R wrist and MCP/ MTP inflammation In future if needed: would consider Actemra/ or Atiya (low cardiac risk)  2) Compliance: still unclear language barrier, reports understanding and speaks good English but following directions not always consistent (still not following medication regimen as written out through instructions noted last visit), misses appointments due to family stressors. Concerned about degree of active joint inflammation and repeated use of steroid for control- again discussed   Need to provide all literature in Nigerian For all future appts must use - STILL Reinforced through  need for communication of worsening sx, AE to allow for appropriate tx adjusmtent and better dx control  3) ENT-related conditions: -Chronic Sinusitis: Dx by endoscope sinuses, rx doxycycline 100mg BID x 10 days. Related to aiCTD-SS vs sicca syndrome??- some moisture but incomplete and many dental issues  -Tinnitus/Hearing loss: Also scheduled MR Brain and auditory canals hx hearing loss (possible also related issue of compliance)  4) Osteoporosis: DEXA 1/19/23 w/ most severe T score -2.5... no comparison (different machine) but previously -2.3 at this level... FN -1.9 previously 0.8 and TH -1.4 previously 0.7. No previous treatment or known fractures. Risks: long term steroids > 3 m (off now), age, RA. Will need treatment but will not address till stable control RA still not achieved..(too overwhelmed to discuss at this time again today) PCP suggested Prolia, only mild osteoporosis, guidelines suggest starting with BP.. , could be a candidate for bisphosphonate but if compliance or language barrier a concern, would do Reclast.  Await tx of epigastric issues as noted above -Still need further discussion and treatment plan  5) Unintentional wt loss: fully resolved over past 6 m.. stable BMI 27 --> now BMI 26 slow weight loss since 08/23 down 5-6 lb likely related to increasing dry mouth and reduced appetite and persistent systemic inflammaiton and cannot r/o underlying malignant process.. w/u in progress (working with Pulm- CTscan chest pending/ GI- EGD done, ? colonoscopy and advanced imaging if nl needed) Still reporting reduced appetite w/ improved dry mouth and resolved globus sensation.  NOTE: Wt had previously stabilized with consistent use of MTX and Prednisone (previously severe GI distress with  7 lbs wt loss in 3 weeks)  6) LBP:  severe lately.. unclear if r/t recent flare - but with severe pain/ swelling in R knee, altered gait... ideally addressing inflammation and PRN use of cyclobenzaprine may help.    Plan:  -continue  leflunomide to 20 mg - let us know if your stomach gets worse  - restart pantoprazole 40 mg daily (If needed-)   - continue 5 mg twice daily for now, once on routine regimen again will try to dc   -Pilocarpine 5mg tablets take 1-2 tablets TID  encouraged to consider daily to see if it helps a dental issue   -Suggested restarting cyclobenzaprine 5-10 mg daily at bedtime to help with lower back pain, once improved you can stop this medication ... Or as needed  -Still need to complete XR knee, and get records from Dr. Tereza Locke regarding updated pulmonary evaluation  -Dentist ongoing  evaluation dry mouth with routinely cleaning recommended ideally q 3 m- but minimum q 6 m- and active dental issues. COmplete 2 wks clinidamycin as ordered and if not better immediately return to dentist   - dental appointments as needed   - updated opth eval in case want to resume HCQ   - RPA with infusion 10/24 and again 12/24- should continue appt every 2 m

## 2024-08-13 NOTE — HISTORY OF PRESENT ILLNESS
[FreeTextEntry1] : MUST USE  AT ALL VISITS TO HELP W/ PATIENT COMPLIANCE  8/13/24 Here for monthly) and infusion held for acute dental infection-  Marked improvement since last visit following steroid injection in her knees and daily prednisone currently at 5 mg Also increased leflunomide to 20 mg tolerating the higher dose without problems Not taking pantoprazole did not think she needed it Not taking pilocarpine also did not think she needed it but presents today with acute pain and tenderness over her left lower jaw with a tooth infection.  Currently on some clindamycin 4 times a day x 2 weeks pain still present after 7 days likely looking at a tooth extraction Not taking cyclobenzaprine at at bedtime as suggested but now telling me she does not think she needs it -Did get updated chest CT essentially stable nothing acute minimal evidence of pulmonary fibrosis or active inflammatory process -Did not get x-ray of knees -Complains of weight loss but overall looks basically stable over the past year  1) Inflammatory polyarthropathy:  CCP + RA (newly dx 3/22)  2) Osteoporosis  ______________________________________________________________________________   (Initial HPI  3/2/22)  56 yo female coming in for consult for long-standing hx of polyarthropathy.   - 2 years of joint pain and swelling, worsens with raining involving R wrist, R elbow, MCPs bilateral R > L, bilateral shoulders, bilateral knees, R ankle.  - Morning stiffness for 10 minutes approx.  - + Back pain worse in the AM, lower. Feels better in 5 minutes.  - Takes OTC Tylenol/Motrin/Aleve with minimal improvement.  - Ankle swelling has been assessed with XR, MRI - reportedly nothing remarkable.  - Plantar fasciitis bilateral dx, has tried steroid injection in bilateral feet with some improvement, but couldn't pursue this option long-term.  - Dx with arthritis, [place] - labs done recently - was told arthritis might be of rheum. origin - Dx with psoriasis a few years ago, hydrocortisone cream improved rash, was on left anterior ankle/distal leg     - Hx of hearing loss (not complete) in left ear - unknown origin. (took high dose of steroids without improvement in the past)  - Denies trigger finger, swelling in fingers, toe swelling, vision changes, diarrhea, abd pain, red eyes, night sweats, lymphadenopathy, chest pain, SOB, pleurisy.   Social:  - P 3 G 3 (oldest 35 y.o), , lives alone.  - Non-smoker, social drinker   FH + history of arthritis (aunt), no family hx of psoriasis

## 2024-08-28 ENCOUNTER — APPOINTMENT (OUTPATIENT)
Dept: RHEUMATOLOGY | Facility: CLINIC | Age: 59
End: 2024-08-28
Payer: COMMERCIAL

## 2024-08-28 VITALS
RESPIRATION RATE: 17 BRPM | TEMPERATURE: 98 F | SYSTOLIC BLOOD PRESSURE: 117 MMHG | HEART RATE: 92 BPM | DIASTOLIC BLOOD PRESSURE: 76 MMHG | OXYGEN SATURATION: 97 %

## 2024-08-28 PROCEDURE — 96374 THER/PROPH/DIAG INJ IV PUSH: CPT | Mod: 59

## 2024-08-28 PROCEDURE — 96413 CHEMO IV INFUSION 1 HR: CPT

## 2024-08-28 RX ORDER — METHYLPREDNISOLONE 40 MG/ML
40 INJECTION, POWDER, LYOPHILIZED, FOR SOLUTION INTRAMUSCULAR; INTRAVENOUS
Qty: 1 | Refills: 0 | Status: COMPLETED
Start: 2023-12-26

## 2024-08-28 RX ORDER — ACETAMINOPHEN 325 MG/1
325 TABLET ORAL
Qty: 0 | Refills: 0 | Status: COMPLETED
Start: 2024-01-25

## 2024-08-28 RX ORDER — CETIRIZINE HYDROCHLORIDE 10 MG/1
10 TABLET, COATED ORAL
Qty: 0 | Refills: 0 | Status: COMPLETED
Start: 2024-01-25

## 2024-08-28 NOTE — HISTORY OF PRESENT ILLNESS
[5] : 5 [N/A] : N/A [Denies] : Denies [No] : No [Yes] : Yes [Declined] : Declined [TB] : Tuberculosis screening [Hep acute panel] : Hepatitis acute panel [de-identified] : hands [Left upper extremity] : Left upper extremity [24g] : 24g [Start Time: ___] : Medication Start Time: [unfilled] [End Time: ___] : Medication End Time: [unfilled] [Medication Name: ___] : Medication Name: [unfilled] [Total Amount Administered: ___] : Total Amount Administered: [unfilled] [IV discontinued. Intact. No signs or symptoms of IV complications noted. Time: ___] : IV discontinued. Intact. No signs or symptoms of IV complications noted. Time: [unfilled] [Patient  instructed to seek medical attention with signs and symptoms of adverse effects] : Patient  instructed to seek medical attention with signs and symptoms of adverse effects [Patient left unit in no acute distress] : Patient left unit in no acute distress [Medications administered as ordered and tolerated well.] : Medications administered as ordered and tolerated well. [de-identified] : 4:00 pm [de-identified] : seen by DNP to assess for ear infection, okay to infuse.

## 2024-09-13 ENCOUNTER — APPOINTMENT (OUTPATIENT)
Dept: OTOLARYNGOLOGY | Facility: CLINIC | Age: 59
End: 2024-09-13
Payer: COMMERCIAL

## 2024-09-13 VITALS — HEIGHT: 57 IN | WEIGHT: 119 LBS | BODY MASS INDEX: 25.67 KG/M2

## 2024-09-13 DIAGNOSIS — H90.3 SENSORINEURAL HEARING LOSS, BILATERAL: ICD-10-CM

## 2024-09-13 DIAGNOSIS — H92.02 OTALGIA, LEFT EAR: ICD-10-CM

## 2024-09-13 DIAGNOSIS — J32.0 CHRONIC MAXILLARY SINUSITIS: ICD-10-CM

## 2024-09-13 DIAGNOSIS — H93.12 TINNITUS, LEFT EAR: ICD-10-CM

## 2024-09-13 PROCEDURE — 92567 TYMPANOMETRY: CPT

## 2024-09-13 PROCEDURE — 92557 COMPREHENSIVE HEARING TEST: CPT

## 2024-09-13 PROCEDURE — 99214 OFFICE O/P EST MOD 30 MIN: CPT | Mod: 25

## 2024-09-13 PROCEDURE — 31231 NASAL ENDOSCOPY DX: CPT

## 2024-09-13 RX ORDER — LEFLUNOMIDE 10 MG/1
10 TABLET, FILM COATED ORAL
Refills: 0 | Status: ACTIVE | COMMUNITY

## 2024-09-13 RX ORDER — LEFLUNOMIDE 20 MG/1
20 TABLET, FILM COATED ORAL
Refills: 0 | Status: ACTIVE | COMMUNITY

## 2024-09-13 RX ORDER — PREDNISONE 5 MG/1
5 TABLET ORAL
Refills: 0 | Status: ACTIVE | COMMUNITY

## 2024-09-13 RX ORDER — FLUTICASONE PROPIONATE 50 UG/1
50 SPRAY, METERED NASAL
Qty: 1 | Refills: 5 | Status: ACTIVE | COMMUNITY
Start: 2024-09-13 | End: 1900-01-01

## 2024-09-13 NOTE — REVIEW OF SYSTEMS
[As Noted in HPI] : as noted in HPI [Patient Intake Form Reviewed] : Patient intake form was reviewed [Negative] : Ear

## 2024-09-13 NOTE — HISTORY OF PRESENT ILLNESS
[de-identified] : Update 9/13/24: 59 year female presents for f/u for ears and tinnitus Pt still co of tinnitus in left ear-worsened since LCV co worse hearing and increase tinnitus Intermittent headaches, mild pain behind Left ear. Continuing with saline rinses 2x/day- provides relief. No co of mucous, No vertigo episodes noted coronal headahces persistent throat discomfort no fullness or pain over left cheek 4/4/24:  Co noise left ear worse last 2 weeks, hx low frequency loss, episode room spinning 1 mo ago x several minutes no relapse, neg hx migraine, co excessive mucous at times yellow

## 2024-09-13 NOTE — ASSESSMENT
[FreeTextEntry1] : mri left max w moderate thickening secretion cpa neg mastoids neg left postauricular pain more like related t c spine audio wnl a/a tymps tinnitus Noise or tinnitus can be masked with external sounds such as white noise.  Fans or ac at night can help or the use of a sound generating roderick or device. Hearing aids if indicated can have a masking function programed in. trial fluticasone for facial pressure advil sinus

## 2024-09-13 NOTE — PROCEDURE
[FreeTextEntry6] : Indication:  Unable to adequately examine nasal passages and sinus drainage with anterior rhinoscopy. The patient has mri clouding left max, throat discomfort  Scope # 47 Mild septal deviation is present on direct visualization on either side. Both inferior nasal turbinates are moderate in size with normal  appearing mucosa.  The sinus endoscope was introduced into the right nares exam right middle meatus reveals no mucopus, polyps or inflammation.  The middle turbinate is unremarkable. The scope was advanced and the sphenoethmoid region was inspected. The superior meatus and nasal vault are unremarkable.  The nasopharynx is unremarkable without inflammation or mass The sinus endoscope was introduced into the left nares exam of the left middle meatus reveals no mucopus, polyps or inflammation and the left middle turbinate is unremarkable. The scope was advanced and the sphenoethmoid region was inspected. The left superior meatus and nasal vault are unremarkable. The fiberoptic scope was introduced to the posterior pharynx and exam of the endolarynx is wnl w good vocal cord mobility. No lesion noted in hypopharynx.  There is no erythema or edema of the posterior larynx to suggest reflux.

## 2024-09-24 ENCOUNTER — APPOINTMENT (OUTPATIENT)
Dept: RHEUMATOLOGY | Facility: CLINIC | Age: 59
End: 2024-09-24
Payer: COMMERCIAL

## 2024-09-24 ENCOUNTER — APPOINTMENT (OUTPATIENT)
Dept: RHEUMATOLOGY | Facility: CLINIC | Age: 59
End: 2024-09-24

## 2024-09-24 VITALS
RESPIRATION RATE: 17 BRPM | HEART RATE: 84 BPM | DIASTOLIC BLOOD PRESSURE: 71 MMHG | SYSTOLIC BLOOD PRESSURE: 102 MMHG | TEMPERATURE: 98 F | OXYGEN SATURATION: 98 %

## 2024-09-24 PROCEDURE — 96413 CHEMO IV INFUSION 1 HR: CPT

## 2024-09-24 PROCEDURE — 96374 THER/PROPH/DIAG INJ IV PUSH: CPT | Mod: 59

## 2024-09-24 RX ORDER — ACETAMINOPHEN 325 MG/1
325 TABLET ORAL
Qty: 0 | Refills: 0 | Status: COMPLETED
Start: 2024-01-25

## 2024-09-24 RX ORDER — CETIRIZINE HYDROCHLORIDE 10 MG/1
10 TABLET, COATED ORAL
Qty: 0 | Refills: 0 | Status: COMPLETED
Start: 2024-01-25

## 2024-09-24 RX ORDER — METHYLPREDNISOLONE 40 MG/ML
40 INJECTION, POWDER, LYOPHILIZED, FOR SOLUTION INTRAMUSCULAR; INTRAVENOUS
Qty: 1 | Refills: 0 | Status: COMPLETED
Start: 2023-12-26

## 2024-09-24 NOTE — HISTORY OF PRESENT ILLNESS
[5] : 5 [Denies] : Denies [No] : No [Yes] : Yes [Declined] : Declined [TB] : Tuberculosis screening [Hep acute panel] : Hepatitis acute panel [Right upper extremity] : Right upper extremity [24g] : 24g [Start Time: ___] : Medication Start Time: [unfilled] [End Time: ___] : Medication End Time: [unfilled] [Medication Name: ___] : Medication Name: [unfilled] [Total Amount Administered: ___] : Total Amount Administered: [unfilled] [IV discontinued. Intact. No signs or symptoms of IV complications noted. Time: ___] : IV discontinued. Intact. No signs or symptoms of IV complications noted. Time: [unfilled] [Patient  instructed to seek medical attention with signs and symptoms of adverse effects] : Patient  instructed to seek medical attention with signs and symptoms of adverse effects [Patient left unit in no acute distress] : Patient left unit in no acute distress [Medications administered as ordered and tolerated well.] : Medications administered as ordered and tolerated well. [de-identified] : Right hand [de-identified] : script for blood work given to patient [de-identified] : 3:45 pm [de-identified] : MATILDE Diaz spoke with the patient regarding the right hand discomfort

## 2024-10-15 RX ORDER — ABATACEPT 250 MG/15ML
250 INJECTION, POWDER, LYOPHILIZED, FOR SOLUTION INTRAVENOUS
Refills: 0 | Status: ACTIVE | OUTPATIENT
Start: 2024-10-15

## 2024-10-29 ENCOUNTER — APPOINTMENT (OUTPATIENT)
Dept: RHEUMATOLOGY | Facility: CLINIC | Age: 59
End: 2024-10-29
Payer: COMMERCIAL

## 2024-10-29 ENCOUNTER — APPOINTMENT (OUTPATIENT)
Dept: RHEUMATOLOGY | Facility: CLINIC | Age: 59
End: 2024-10-29

## 2024-10-29 VITALS
OXYGEN SATURATION: 98 % | DIASTOLIC BLOOD PRESSURE: 65 MMHG | SYSTOLIC BLOOD PRESSURE: 97 MMHG | BODY MASS INDEX: 26.4 KG/M2 | RESPIRATION RATE: 16 BRPM | WEIGHT: 122 LBS | TEMPERATURE: 98 F | HEART RATE: 71 BPM

## 2024-10-29 VITALS
HEART RATE: 86 BPM | OXYGEN SATURATION: 98 % | SYSTOLIC BLOOD PRESSURE: 104 MMHG | DIASTOLIC BLOOD PRESSURE: 57 MMHG | RESPIRATION RATE: 17 BRPM

## 2024-10-29 DIAGNOSIS — H68.022 CHRONIC EUSTACHIAN SALPINGITIS, LEFT EAR: ICD-10-CM

## 2024-10-29 DIAGNOSIS — M06.9 RHEUMATOID ARTHRITIS, UNSPECIFIED: ICD-10-CM

## 2024-10-29 DIAGNOSIS — R68.2 DRY MOUTH, UNSPECIFIED: ICD-10-CM

## 2024-10-29 DIAGNOSIS — Z91.89 OTHER SPECIFIED PERSONAL RISK FACTORS, NOT ELSEWHERE CLASSIFIED: ICD-10-CM

## 2024-10-29 DIAGNOSIS — H04.123 DRY MOUTH, UNSPECIFIED: ICD-10-CM

## 2024-10-29 PROCEDURE — 96413 CHEMO IV INFUSION 1 HR: CPT

## 2024-10-29 PROCEDURE — ZZZZZ: CPT

## 2024-10-29 PROCEDURE — 96374 THER/PROPH/DIAG INJ IV PUSH: CPT | Mod: 59

## 2024-10-29 PROCEDURE — G0008: CPT

## 2024-10-29 PROCEDURE — 99215 OFFICE O/P EST HI 40 MIN: CPT | Mod: 25

## 2024-10-29 PROCEDURE — 90662 IIV NO PRSV INCREASED AG IM: CPT

## 2024-10-29 RX ORDER — METHYLPREDNISOLONE 40 MG/ML
40 INJECTION, POWDER, LYOPHILIZED, FOR SOLUTION INTRAMUSCULAR; INTRAVENOUS
Qty: 1 | Refills: 0 | Status: COMPLETED
Start: 2023-12-26

## 2024-10-29 RX ORDER — ACETAMINOPHEN 325 MG/1
325 TABLET ORAL
Qty: 0 | Refills: 0 | Status: COMPLETED
Start: 2024-01-25

## 2024-10-29 RX ORDER — CETIRIZINE HYDROCHLORIDE 10 MG/1
10 TABLET, COATED ORAL
Qty: 0 | Refills: 0 | Status: COMPLETED
Start: 2024-01-25

## 2024-10-29 RX ORDER — ZOSTER VACCINE RECOMBINANT, ADJUVANTED 50 MCG/0.5
50 KIT INTRAMUSCULAR
Qty: 1 | Refills: 1 | Status: ACTIVE | COMMUNITY
Start: 2024-10-29 | End: 1900-01-01

## 2024-10-29 RX ORDER — ABATACEPT 250 MG/15ML
250 INJECTION, POWDER, LYOPHILIZED, FOR SOLUTION INTRAVENOUS
Qty: 0 | Refills: 0 | Status: COMPLETED
Start: 2024-10-15

## 2024-11-27 ENCOUNTER — APPOINTMENT (OUTPATIENT)
Dept: RHEUMATOLOGY | Facility: CLINIC | Age: 59
End: 2024-11-27
Payer: COMMERCIAL

## 2024-11-27 ENCOUNTER — MED ADMIN CHARGE (OUTPATIENT)
Age: 59
End: 2024-11-27

## 2024-11-27 VITALS
HEART RATE: 69 BPM | SYSTOLIC BLOOD PRESSURE: 96 MMHG | TEMPERATURE: 98 F | OXYGEN SATURATION: 99 % | DIASTOLIC BLOOD PRESSURE: 63 MMHG | RESPIRATION RATE: 17 BRPM

## 2024-11-27 PROCEDURE — 96374 THER/PROPH/DIAG INJ IV PUSH: CPT | Mod: 59

## 2024-11-27 PROCEDURE — 96413 CHEMO IV INFUSION 1 HR: CPT

## 2024-11-27 RX ORDER — ABATACEPT 250 MG/15ML
250 INJECTION, POWDER, LYOPHILIZED, FOR SOLUTION INTRAVENOUS
Qty: 0 | Refills: 0 | Status: COMPLETED
Start: 2024-10-15

## 2024-11-27 RX ORDER — ACETAMINOPHEN 325 MG/1
325 TABLET ORAL
Qty: 0 | Refills: 0 | Status: COMPLETED
Start: 2024-01-25

## 2024-11-27 RX ORDER — CETIRIZINE HYDROCHLORIDE 10 MG/1
10 TABLET, COATED ORAL
Qty: 0 | Refills: 0 | Status: COMPLETED
Start: 2024-01-25

## 2024-11-27 RX ORDER — METHYLPREDNISOLONE 40 MG/ML
40 INJECTION, POWDER, LYOPHILIZED, FOR SOLUTION INTRAMUSCULAR; INTRAVENOUS
Qty: 1 | Refills: 0 | Status: COMPLETED
Start: 2023-12-26

## 2024-12-30 RX ORDER — METHYLPREDNISOLONE 40 MG/ML
40 INJECTION, POWDER, LYOPHILIZED, FOR SOLUTION INTRAMUSCULAR; INTRAVENOUS
Refills: 0 | Status: ACTIVE | OUTPATIENT
Start: 2024-12-30 | End: 1900-01-01

## 2024-12-31 ENCOUNTER — APPOINTMENT (OUTPATIENT)
Dept: RHEUMATOLOGY | Facility: CLINIC | Age: 59
End: 2024-12-31
Payer: COMMERCIAL

## 2024-12-31 ENCOUNTER — MED ADMIN CHARGE (OUTPATIENT)
Age: 59
End: 2024-12-31

## 2024-12-31 VITALS
WEIGHT: 126 LBS | OXYGEN SATURATION: 98 % | TEMPERATURE: 98 F | DIASTOLIC BLOOD PRESSURE: 71 MMHG | SYSTOLIC BLOOD PRESSURE: 102 MMHG | HEART RATE: 76 BPM | RESPIRATION RATE: 17 BRPM | BODY MASS INDEX: 27.27 KG/M2

## 2024-12-31 DIAGNOSIS — G56.01 CARPAL TUNNEL SYNDROME, RIGHT UPPER LIMB: ICD-10-CM

## 2024-12-31 DIAGNOSIS — Z91.89 OTHER SPECIFIED PERSONAL RISK FACTORS, NOT ELSEWHERE CLASSIFIED: ICD-10-CM

## 2024-12-31 DIAGNOSIS — M81.0 AGE-RELATED OSTEOPOROSIS W/OUT CURRENT PATHOLOGICAL FRACTURE: ICD-10-CM

## 2024-12-31 DIAGNOSIS — M06.9 RHEUMATOID ARTHRITIS, UNSPECIFIED: ICD-10-CM

## 2024-12-31 DIAGNOSIS — H93.12 TINNITUS, LEFT EAR: ICD-10-CM

## 2024-12-31 PROCEDURE — ZZZZZ: CPT

## 2024-12-31 PROCEDURE — 99214 OFFICE O/P EST MOD 30 MIN: CPT | Mod: 25

## 2024-12-31 PROCEDURE — G0009: CPT

## 2024-12-31 PROCEDURE — 96413 CHEMO IV INFUSION 1 HR: CPT

## 2024-12-31 PROCEDURE — 90677 PCV20 VACCINE IM: CPT

## 2024-12-31 PROCEDURE — 96374 THER/PROPH/DIAG INJ IV PUSH: CPT | Mod: 59

## 2024-12-31 RX ORDER — ACETAMINOPHEN 325 MG/1
325 TABLET ORAL
Qty: 0 | Refills: 0 | Status: COMPLETED
Start: 2024-01-25

## 2024-12-31 RX ORDER — ABATACEPT 250 MG/15ML
250 INJECTION, POWDER, LYOPHILIZED, FOR SOLUTION INTRAVENOUS
Qty: 0 | Refills: 0 | Status: COMPLETED
Start: 2024-10-15

## 2024-12-31 RX ORDER — METHYLPREDNISOLONE 40 MG/ML
40 INJECTION, POWDER, LYOPHILIZED, FOR SOLUTION INTRAMUSCULAR; INTRAVENOUS
Qty: 1 | Refills: 0 | Status: COMPLETED
Start: 2024-12-30

## 2024-12-31 RX ORDER — CETIRIZINE HYDROCHLORIDE 10 MG/1
10 TABLET, COATED ORAL
Qty: 0 | Refills: 0 | Status: COMPLETED
Start: 2024-01-25

## 2025-01-01 PROBLEM — G56.01 CARPAL TUNNEL SYNDROME OF RIGHT WRIST: Status: ACTIVE | Noted: 2025-01-01

## 2025-01-27 ENCOUNTER — NON-APPOINTMENT (OUTPATIENT)
Age: 60
End: 2025-01-27

## 2025-01-28 ENCOUNTER — APPOINTMENT (OUTPATIENT)
Dept: RHEUMATOLOGY | Facility: CLINIC | Age: 60
End: 2025-01-28

## 2025-02-04 ENCOUNTER — APPOINTMENT (OUTPATIENT)
Dept: RHEUMATOLOGY | Facility: CLINIC | Age: 60
End: 2025-02-04
Payer: COMMERCIAL

## 2025-02-04 ENCOUNTER — MED ADMIN CHARGE (OUTPATIENT)
Age: 60
End: 2025-02-04

## 2025-02-04 VITALS
DIASTOLIC BLOOD PRESSURE: 58 MMHG | HEART RATE: 76 BPM | TEMPERATURE: 98 F | RESPIRATION RATE: 16 BRPM | SYSTOLIC BLOOD PRESSURE: 102 MMHG | OXYGEN SATURATION: 98 %

## 2025-02-04 PROCEDURE — 96374 THER/PROPH/DIAG INJ IV PUSH: CPT | Mod: 59

## 2025-02-04 PROCEDURE — 96413 CHEMO IV INFUSION 1 HR: CPT

## 2025-02-04 RX ORDER — METHYLPREDNISOLONE 40 MG/ML
40 INJECTION, POWDER, LYOPHILIZED, FOR SOLUTION INTRAMUSCULAR; INTRAVENOUS
Qty: 1 | Refills: 0 | Status: COMPLETED
Start: 2024-12-30

## 2025-02-04 RX ORDER — ABATACEPT 250 MG/15ML
250 INJECTION, POWDER, LYOPHILIZED, FOR SOLUTION INTRAVENOUS
Qty: 0 | Refills: 0 | Status: COMPLETED
Start: 2024-10-15

## 2025-02-04 RX ADMIN — ACETAMINOPHEN 0 MG: 325 TABLET ORAL at 00:00

## 2025-02-04 RX ADMIN — CETIRIZINE HYDROCHLORIDE 1 MG: 10 TABLET, COATED ORAL at 00:00

## 2025-02-05 RX ORDER — CETIRIZINE HYDROCHLORIDE 10 MG/1
10 TABLET, COATED ORAL
Refills: 0 | Status: ACTIVE | OUTPATIENT
Start: 2025-02-04 | End: 1900-01-01

## 2025-02-05 RX ORDER — ACETAMINOPHEN 325 MG/1
325 TABLET ORAL
Refills: 0 | Status: ACTIVE | OUTPATIENT
Start: 2025-02-04 | End: 1900-01-01

## 2025-03-06 ENCOUNTER — MED ADMIN CHARGE (OUTPATIENT)
Age: 60
End: 2025-03-06

## 2025-03-06 ENCOUNTER — APPOINTMENT (OUTPATIENT)
Dept: RHEUMATOLOGY | Facility: CLINIC | Age: 60
End: 2025-03-06
Payer: COMMERCIAL

## 2025-03-06 VITALS
TEMPERATURE: 98 F | HEART RATE: 67 BPM | OXYGEN SATURATION: 98 % | RESPIRATION RATE: 14 BRPM | DIASTOLIC BLOOD PRESSURE: 73 MMHG | SYSTOLIC BLOOD PRESSURE: 105 MMHG

## 2025-03-06 PROCEDURE — 96374 THER/PROPH/DIAG INJ IV PUSH: CPT | Mod: 59

## 2025-03-06 PROCEDURE — 96413 CHEMO IV INFUSION 1 HR: CPT

## 2025-03-06 RX ORDER — METHYLPREDNISOLONE 40 MG/ML
40 INJECTION, POWDER, LYOPHILIZED, FOR SOLUTION INTRAMUSCULAR; INTRAVENOUS
Qty: 1 | Refills: 0 | Status: COMPLETED
Start: 2024-12-30

## 2025-03-06 RX ORDER — ABATACEPT 250 MG/15ML
250 INJECTION, POWDER, LYOPHILIZED, FOR SOLUTION INTRAVENOUS
Qty: 0 | Refills: 0 | Status: COMPLETED
Start: 2024-10-15

## 2025-03-28 ENCOUNTER — APPOINTMENT (OUTPATIENT)
Dept: RHEUMATOLOGY | Facility: CLINIC | Age: 60
End: 2025-03-28
Payer: COMMERCIAL

## 2025-03-28 VITALS
DIASTOLIC BLOOD PRESSURE: 70 MMHG | OXYGEN SATURATION: 99 % | HEIGHT: 57 IN | HEART RATE: 69 BPM | WEIGHT: 123 LBS | BODY MASS INDEX: 26.54 KG/M2 | TEMPERATURE: 97.6 F | SYSTOLIC BLOOD PRESSURE: 110 MMHG

## 2025-03-28 DIAGNOSIS — R68.2 DRY MOUTH, UNSPECIFIED: ICD-10-CM

## 2025-03-28 DIAGNOSIS — M81.0 AGE-RELATED OSTEOPOROSIS W/OUT CURRENT PATHOLOGICAL FRACTURE: ICD-10-CM

## 2025-03-28 DIAGNOSIS — M06.9 RHEUMATOID ARTHRITIS, UNSPECIFIED: ICD-10-CM

## 2025-03-28 DIAGNOSIS — H04.123 DRY MOUTH, UNSPECIFIED: ICD-10-CM

## 2025-03-28 DIAGNOSIS — K21.9 GASTRO-ESOPHAGEAL REFLUX DISEASE W/OUT ESOPHAGITIS: ICD-10-CM

## 2025-03-28 PROCEDURE — 99214 OFFICE O/P EST MOD 30 MIN: CPT

## 2025-03-28 PROCEDURE — G2211 COMPLEX E/M VISIT ADD ON: CPT | Mod: NC

## 2025-03-30 ENCOUNTER — EMERGENCY (EMERGENCY)
Facility: HOSPITAL | Age: 60
LOS: 0 days | Discharge: HOME | End: 2025-03-30
Attending: EMERGENCY MEDICINE
Payer: COMMERCIAL

## 2025-03-30 VITALS
SYSTOLIC BLOOD PRESSURE: 132 MMHG | HEART RATE: 69 BPM | DIASTOLIC BLOOD PRESSURE: 82 MMHG | TEMPERATURE: 98 F | RESPIRATION RATE: 18 BRPM | OXYGEN SATURATION: 97 %

## 2025-03-30 DIAGNOSIS — M06.9 RHEUMATOID ARTHRITIS, UNSPECIFIED: ICD-10-CM

## 2025-03-30 DIAGNOSIS — H57.13 OCULAR PAIN, BILATERAL: ICD-10-CM

## 2025-03-30 DIAGNOSIS — Z88.6 ALLERGY STATUS TO ANALGESIC AGENT: ICD-10-CM

## 2025-03-30 DIAGNOSIS — Z91.018 ALLERGY TO OTHER FOODS: ICD-10-CM
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
## 2025-03-30 DIAGNOSIS — Z98.890 OTHER SPECIFIED POSTPROCEDURAL STATES: Chronic | ICD-10-CM

## 2025-03-30 DIAGNOSIS — M81.0 AGE-RELATED OSTEOPOROSIS WITHOUT CURRENT PATHOLOGICAL FRACTURE: ICD-10-CM

## 2025-03-30 DIAGNOSIS — S05.01XA INJURY OF CONJUNCTIVA AND CORNEAL ABRASION WITHOUT FOREIGN BODY, RIGHT EYE, INITIAL ENCOUNTER: ICD-10-CM

## 2025-03-30 DIAGNOSIS — Z88.0 ALLERGY STATUS TO PENICILLIN: ICD-10-CM

## 2025-03-30 DIAGNOSIS — H10.9 UNSPECIFIED CONJUNCTIVITIS: ICD-10-CM
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
## 2025-03-30 DIAGNOSIS — Z91.040 LATEX ALLERGY STATUS: ICD-10-CM

## 2025-03-30 PROCEDURE — 99283 EMERGENCY DEPT VISIT LOW MDM: CPT

## 2025-03-30 PROCEDURE — 99284 EMERGENCY DEPT VISIT MOD MDM: CPT

## 2025-03-30 RX ORDER — TETRACAINE HYDROCHLORIDE 5 MG/ML
1 SOLUTION OPHTHALMIC ONCE
Refills: 0 | Status: COMPLETED | OUTPATIENT
Start: 2025-03-30 | End: 2025-03-30

## 2025-03-30 RX ORDER — FLUORESCEIN SODIUM 0.6 MG
1 STRIP OPHTHALMIC (EYE) ONCE
Refills: 0 | Status: COMPLETED | OUTPATIENT
Start: 2025-03-30 | End: 2025-03-30

## 2025-03-30 RX ORDER — POLYMYXIN B SULFATE AND TRIMETHOPRIM SULFATE 10000; 1 [USP'U]/ML; MG/ML
1 SOLUTION/ DROPS OPHTHALMIC
Qty: 1 | Refills: 0
Start: 2025-03-30 | End: 2025-04-03

## 2025-03-30 RX ORDER — ERYTHROMYCIN 5 MG/G
1 OINTMENT OPHTHALMIC
Qty: 2 | Refills: 0
Start: 2025-03-30

## 2025-03-30 RX ADMIN — Medication 1 APPLICATION(S): at 20:11

## 2025-03-30 RX ADMIN — TETRACAINE HYDROCHLORIDE 1 DROP(S): 5 SOLUTION OPHTHALMIC at 20:11

## 2025-03-30 NOTE — ED ADULT TRIAGE NOTE - CHIEF COMPLAINT QUOTE
Pt ambulatory to the ED c/o b/l eye pain, redness, and blurred vision x 1 week. R eye worse than L eye. Pt saw ophthalmologist and was given antibiotic eyedrops and pataday and refresh liquigel eye drops with no improvement.

## 2025-03-30 NOTE — ED STATDOCS - PATIENT PORTAL LINK FT
You can access the FollowMyHealth Patient Portal offered by [facility] by registering at the following website: [email or web address]. By joining [facility]’s FollowMyHealth portal, you will also be able to view your health information using other applications (apps) compatible with our system.

## 2025-03-30 NOTE — ED ADULT NURSE REASSESSMENT NOTE - NS ED NURSE REASSESS COMMENT FT1
Pt seen, treated and cleared for dc by KATTY Moulton with previously prescribed eye gtts, ointment and optho flu. Pt seen, treated and cleared for dc by KATTY Moulton and to stop previously prescribed eye gtts, and use new ointment and optho flu.

## 2025-03-30 NOTE — ED STATDOCS - OBJECTIVE STATEMENT
61 y/o F with PMHx of osteoporosis, RA, recent R carpal tunnel release surgery presents to the ED c/o burning b/l eye pain, itchiness, and blurry vision x1 week. States sx are worse in the R eye. Seen by Ophthalmologist a few days ago and given clindamycin/dexamethasone eyedrops, pataday, and refresh liquigel eye drops without relief. 61 y/o F with PMHx of osteoporosis, RA, recent R carpal tunnel release surgery presents to the ED c/o burning b/l eye pain, redness, itchiness, and blurry vision x1 week. States sx are worse in the R eye. Seen by Ophthalmologist a few days ago and given clindamycin/dexamethasone eyedrops, pataday, and refresh liquigel eye drops without relief.

## 2025-03-30 NOTE — ED STATDOCS - NSFOLLOWUPINSTRUCTIONS_ED_ALL_ED_FT
PLEASE USE STOP USING PREVIOUSLY PRESCRIBED MEDICATIONS FOR EYES. START NEW OINTMENT AND DROPS TONIGHT. FOLLOW UP WITH YOUR OPHTHALMOLOGIST NEXT WEEK FOR FURTHER ASSESSMENT.     Corneal Abrasion     WHAT YOU NEED TO KNOW:    A corneal abrasion is a scratch on the cornea of your eye. The cornea is the clear layer that covers the front of your eye. A small scratch may heal in 1 to 2 days. Deeper or larger scratches may take longer to heal. Eye Anatomy         DISCHARGE INSTRUCTIONS:    Call your healthcare provider or ophthalmologist if:     Your eye pain or vision gets worse.      You have yellow or green drainage from your eye.      You have questions or concerns about your condition or care.    Medicines:     Medicines may be given in the form of eyedrops or ointment to help prevent an eye infection. You may also be given eyedrops to decrease pain.      Take your medicine as directed. Contact your healthcare provider if you think your medicine is not helping or if you have side effects. Tell him or her if you are allergic to any medicine. Keep a list of the medicines, vitamins, and herbs you take. Include the amounts, and when and why you take them. Bring the list or the pill bottles to follow-up visits. Carry your medicine list with you in case of an emergency.    Care for your eyes:     Get regular eye exams. Get your eyes checked at least every year.      Eat healthy foods. Fresh fruits and vegetables that are rich in vitamins A and C may help with your vision. Foods such as sweet potatoes, apricots, and carrots are rich in good nutrients for the eyes.      Take care of your contacts or glasses. Store, clean, and use your contacts or glasses as directed. Replace your glasses or contact lenses as often as your healthcare provider suggests.      Decrease eye strain. Rest your eyes, especially after you read or sew for long periods of time. Get plenty of sleep at night. Use lights that reduce glare in your home, school, or workplace.      Wear dark sunglasses. This will help prevent pain and light sensitivity. Make sure the sunglasses have UVA and UVB protection. This will protect your eyes when you go outside.      Use eyedrops safely. If your treatment plan includes eyedrops, it is important to use them as directed. Your provider may give you detailed instructions to follow. The eyedrops may also come with safety instructions. Follow all instructions to help prevent an infection. Do not touch the tip of the bottle to your eye. Germs from your eye can spread to the medicine bottle.Steps 1 2 3 4         Help prevent corneal abrasions:     Remove your contact lenses if your eyes feel dry or irritated.      Wash your hands if you need to touch your eyes or your face.      Trim your child's fingernails so he or she cannot scratch his or her eye.      Wear protective eyewear when you work with chemicals, wood, dust, or metal.      Wear protective eyewear when you play sports.      Do not wear your contacts for longer than you should.      Do not wear colored lenses or lenses with shapes on them. These lenses may cause eye damage and vision loss.      Do not wear glitter makeup. Glitter can easily get into your eyes and under contact lenses.      Do not sleep with your contacts in.    Follow up with your healthcare provider as directed: Write down your questions so you remember to ask them during your visits.

## 2025-03-30 NOTE — ED STATDOCS - EYES, MLM
clear bilaterally.  Pupils equal, round, and reactive to light. mild b/l conjunctival injection, white discharge, no trauma, no foreign body, no hypopyon, no preseptal cellulitis

## 2025-03-30 NOTE — ED STATDOCS - CLINICAL SUMMARY MEDICAL DECISION MAKING FREE TEXT BOX
Differential diagnosis includes bacterial versus viral versus allergic conjunctivitis, corneal abrasion, uveitis, iritis, glaucoma.  No evidence of preseptal cellulitis.  Visual acuity 20/30 bilaterally, normal extraocular movements, no hypopyon, does have findings of a conjunctivitis, no foreign body, intraocular pressure is normal, does have a crown abrasion in the right eye.  Diagnosis of conjunctivitis, corneal abrasion.  Patient given topical antibiotics, recommending patient follow-up closely with her ophthalmologist in the next 1 to 2 days.   Strict return precautions given for any worsening.  Patient verbalized understanding and agreed to plan.

## 2025-04-18 ENCOUNTER — NON-APPOINTMENT (OUTPATIENT)
Age: 60
End: 2025-04-18

## 2025-04-18 ENCOUNTER — APPOINTMENT (OUTPATIENT)
Dept: RHEUMATOLOGY | Facility: CLINIC | Age: 60
End: 2025-04-18
Payer: COMMERCIAL

## 2025-04-18 ENCOUNTER — MED ADMIN CHARGE (OUTPATIENT)
Age: 60
End: 2025-04-18

## 2025-04-18 VITALS
DIASTOLIC BLOOD PRESSURE: 69 MMHG | HEART RATE: 74 BPM | SYSTOLIC BLOOD PRESSURE: 109 MMHG | TEMPERATURE: 97.3 F | OXYGEN SATURATION: 98 %

## 2025-04-18 PROCEDURE — 96413 CHEMO IV INFUSION 1 HR: CPT

## 2025-04-18 PROCEDURE — 96374 THER/PROPH/DIAG INJ IV PUSH: CPT | Mod: 59

## 2025-04-18 RX ORDER — CETIRIZINE HYDROCHLORIDE 10 MG/1
10 TABLET, COATED ORAL
Qty: 0 | Refills: 0 | Status: COMPLETED
Start: 2025-02-04

## 2025-04-18 RX ORDER — ACETAMINOPHEN 325 MG/1
325 TABLET ORAL
Qty: 0 | Refills: 0 | Status: COMPLETED
Start: 2025-02-04

## 2025-04-18 RX ORDER — ABATACEPT 250 MG/15ML
250 INJECTION, POWDER, LYOPHILIZED, FOR SOLUTION INTRAVENOUS
Qty: 0 | Refills: 0 | Status: COMPLETED
Start: 2024-10-15

## 2025-04-18 RX ORDER — METHYLPREDNISOLONE SODIUM SUCCINATE 40 MG/ML
40 INJECTION, POWDER, LYOPHILIZED, FOR SOLUTION INTRAMUSCULAR; INTRAVENOUS
Qty: 1 | Refills: 0 | Status: COMPLETED
Start: 2024-12-30

## 2025-04-22 DIAGNOSIS — M06.9 RHEUMATOID ARTHRITIS, UNSPECIFIED: ICD-10-CM

## 2025-04-22 RX ORDER — METHYLPREDNISOLONE 4 MG/1
4 TABLET ORAL
Qty: 1 | Refills: 0 | Status: ACTIVE | COMMUNITY
Start: 2025-04-22 | End: 1900-01-01

## 2025-05-19 ENCOUNTER — LABORATORY RESULT (OUTPATIENT)
Age: 60
End: 2025-05-19

## 2025-05-21 ENCOUNTER — APPOINTMENT (OUTPATIENT)
Dept: RHEUMATOLOGY | Facility: CLINIC | Age: 60
End: 2025-05-21
Payer: COMMERCIAL

## 2025-05-21 ENCOUNTER — NON-APPOINTMENT (OUTPATIENT)
Age: 60
End: 2025-05-21

## 2025-05-21 ENCOUNTER — MED ADMIN CHARGE (OUTPATIENT)
Age: 60
End: 2025-05-21

## 2025-05-21 VITALS
DIASTOLIC BLOOD PRESSURE: 47 MMHG | OXYGEN SATURATION: 96 % | RESPIRATION RATE: 18 BRPM | SYSTOLIC BLOOD PRESSURE: 96 MMHG | HEART RATE: 75 BPM

## 2025-05-21 PROCEDURE — 96413 CHEMO IV INFUSION 1 HR: CPT

## 2025-05-21 PROCEDURE — 96374 THER/PROPH/DIAG INJ IV PUSH: CPT | Mod: 59

## 2025-05-21 RX ORDER — CETIRIZINE HYDROCHLORIDE 10 MG/1
10 TABLET, COATED ORAL
Qty: 0 | Refills: 0 | Status: COMPLETED
Start: 2025-02-04

## 2025-05-21 RX ORDER — ABATACEPT 250 MG/15ML
250 INJECTION, POWDER, LYOPHILIZED, FOR SOLUTION INTRAVENOUS
Qty: 0 | Refills: 0 | Status: COMPLETED
Start: 2024-10-15

## 2025-05-21 RX ORDER — ACETAMINOPHEN 325 MG/1
325 TABLET ORAL
Qty: 0 | Refills: 0 | Status: COMPLETED
Start: 2025-02-04

## 2025-05-21 RX ORDER — METHYLPREDNISOLONE SODIUM SUCCINATE 40 MG/ML
40 INJECTION, POWDER, LYOPHILIZED, FOR SOLUTION INTRAMUSCULAR; INTRAVENOUS
Qty: 1 | Refills: 0 | Status: COMPLETED
Start: 2024-12-30

## 2025-06-06 ENCOUNTER — APPOINTMENT (OUTPATIENT)
Dept: MAMMOGRAPHY | Facility: CLINIC | Age: 60
End: 2025-06-06

## 2025-06-17 ENCOUNTER — APPOINTMENT (OUTPATIENT)
Dept: RHEUMATOLOGY | Facility: CLINIC | Age: 60
End: 2025-06-17

## 2025-06-19 ENCOUNTER — APPOINTMENT (OUTPATIENT)
Dept: RHEUMATOLOGY | Facility: CLINIC | Age: 60
End: 2025-06-19
Payer: COMMERCIAL

## 2025-06-19 VITALS — DIASTOLIC BLOOD PRESSURE: 70 MMHG | OXYGEN SATURATION: 97 % | SYSTOLIC BLOOD PRESSURE: 102 MMHG | HEART RATE: 87 BPM

## 2025-06-19 VITALS — OXYGEN SATURATION: 98 % | SYSTOLIC BLOOD PRESSURE: 111 MMHG | HEART RATE: 73 BPM | DIASTOLIC BLOOD PRESSURE: 69 MMHG

## 2025-06-19 PROCEDURE — 96374 THER/PROPH/DIAG INJ IV PUSH: CPT | Mod: 59

## 2025-06-19 PROCEDURE — 96413 CHEMO IV INFUSION 1 HR: CPT

## 2025-06-19 RX ORDER — ACETAMINOPHEN 325 MG/1
325 TABLET ORAL
Qty: 0 | Refills: 0 | Status: COMPLETED
Start: 2025-02-04

## 2025-06-19 RX ORDER — METHYLPREDNISOLONE SODIUM SUCCINATE 40 MG/ML
40 INJECTION, POWDER, LYOPHILIZED, FOR SOLUTION INTRAMUSCULAR; INTRAVENOUS
Qty: 0 | Refills: 0 | Status: COMPLETED
Start: 2024-12-30

## 2025-06-19 RX ORDER — CETIRIZINE HYDROCHLORIDE 10 MG/1
10 TABLET, COATED ORAL
Qty: 0 | Refills: 0 | Status: COMPLETED
Start: 2025-02-04

## 2025-06-19 RX ORDER — ABATACEPT 250 MG/15ML
250 INJECTION, POWDER, LYOPHILIZED, FOR SOLUTION INTRAVENOUS
Qty: 0 | Refills: 0 | Status: COMPLETED
Start: 2024-10-15

## 2025-07-08 ENCOUNTER — APPOINTMENT (OUTPATIENT)
Dept: RHEUMATOLOGY | Facility: CLINIC | Age: 60
End: 2025-07-08
Payer: COMMERCIAL

## 2025-07-08 ENCOUNTER — NON-APPOINTMENT (OUTPATIENT)
Age: 60
End: 2025-07-08

## 2025-07-08 VITALS
BODY MASS INDEX: 26.1 KG/M2 | HEIGHT: 57 IN | DIASTOLIC BLOOD PRESSURE: 68 MMHG | HEART RATE: 79 BPM | TEMPERATURE: 98.4 F | OXYGEN SATURATION: 99 % | SYSTOLIC BLOOD PRESSURE: 110 MMHG | WEIGHT: 121 LBS

## 2025-07-08 PROCEDURE — 99214 OFFICE O/P EST MOD 30 MIN: CPT | Mod: 25

## 2025-07-08 PROCEDURE — 96372 THER/PROPH/DIAG INJ SC/IM: CPT

## 2025-07-08 RX ORDER — TRIAMCINOLONE ACETONIDE 40 MG/ML
40 SUSPENSION INTRA-ARTERIAL; INTRAMUSCULAR
Refills: 0 | Status: COMPLETED | OUTPATIENT
Start: 2025-07-08

## 2025-07-08 RX ADMIN — TRIAMCINOLONE ACETONIDE 0 MG/ML: 40 INJECTION, SUSPENSION INTRA-ARTICULAR; INTRAMUSCULAR at 00:00

## 2025-07-12 ENCOUNTER — APPOINTMENT (OUTPATIENT)
Dept: MAMMOGRAPHY | Facility: CLINIC | Age: 60
End: 2025-07-12
Payer: COMMERCIAL

## 2025-07-12 ENCOUNTER — OUTPATIENT (OUTPATIENT)
Dept: OUTPATIENT SERVICES | Facility: HOSPITAL | Age: 60
LOS: 1 days | End: 2025-07-12
Payer: COMMERCIAL

## 2025-07-12 DIAGNOSIS — Z00.00 ENCOUNTER FOR GENERAL ADULT MEDICAL EXAMINATION WITHOUT ABNORMAL FINDINGS: ICD-10-CM

## 2025-07-12 DIAGNOSIS — Z98.890 OTHER SPECIFIED POSTPROCEDURAL STATES: Chronic | ICD-10-CM

## 2025-07-12 PROCEDURE — 77063 BREAST TOMOSYNTHESIS BI: CPT | Mod: 26

## 2025-07-12 PROCEDURE — 77067 SCR MAMMO BI INCL CAD: CPT | Mod: 26

## 2025-07-12 PROCEDURE — 77067 SCR MAMMO BI INCL CAD: CPT

## 2025-07-12 PROCEDURE — 77063 BREAST TOMOSYNTHESIS BI: CPT

## 2025-07-17 ENCOUNTER — NON-APPOINTMENT (OUTPATIENT)
Age: 60
End: 2025-07-17

## 2025-07-21 ENCOUNTER — APPOINTMENT (OUTPATIENT)
Dept: RHEUMATOLOGY | Facility: CLINIC | Age: 60
End: 2025-07-21